# Patient Record
Sex: FEMALE | Race: WHITE | NOT HISPANIC OR LATINO | Employment: PART TIME | URBAN - METROPOLITAN AREA
[De-identification: names, ages, dates, MRNs, and addresses within clinical notes are randomized per-mention and may not be internally consistent; named-entity substitution may affect disease eponyms.]

---

## 2017-09-18 ENCOUNTER — HOSPITAL ENCOUNTER (EMERGENCY)
Facility: CLINIC | Age: 27
Discharge: HOME | End: 2017-09-18
Attending: EMERGENCY MEDICINE | Admitting: EMERGENCY MEDICINE
Payer: COMMERCIAL

## 2017-09-18 VITALS
TEMPERATURE: 99.6 F | DIASTOLIC BLOOD PRESSURE: 89 MMHG | OXYGEN SATURATION: 98 % | SYSTOLIC BLOOD PRESSURE: 135 MMHG | RESPIRATION RATE: 16 BRPM

## 2017-09-18 DIAGNOSIS — S05.02XA CORNEAL ABRASION, LEFT, INITIAL ENCOUNTER: ICD-10-CM
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
## 2017-09-18 PROCEDURE — 99283 EMERGENCY DEPT VISIT LOW MDM: CPT | Performed by: EMERGENCY MEDICINE

## 2017-09-18 PROCEDURE — 99284 EMERGENCY DEPT VISIT MOD MDM: CPT | Mod: Z6 | Performed by: EMERGENCY MEDICINE

## 2017-09-18 RX ORDER — CIPROFLOXACIN HYDROCHLORIDE 3.5 MG/ML
1 SOLUTION/ DROPS TOPICAL
Status: DISCONTINUED | OUTPATIENT
Start: 2017-09-18 | End: 2017-09-18 | Stop reason: CLARIF

## 2017-09-18 RX ORDER — CIPROFLOXACIN 500 MG/1
500 TABLET, FILM COATED ORAL 2 TIMES DAILY
Qty: 14 TABLET | Refills: 0 | Status: SHIPPED | OUTPATIENT
Start: 2017-09-18 | End: 2017-09-18

## 2017-09-18 RX ORDER — OXYCODONE AND ACETAMINOPHEN 5; 325 MG/1; MG/1
1-2 TABLET ORAL EVERY 4 HOURS PRN
Qty: 4 TABLET | Refills: 0 | Status: SHIPPED | OUTPATIENT
Start: 2017-09-18 | End: 2018-08-09

## 2017-09-18 RX ORDER — OFLOXACIN 3 MG/ML
1 SOLUTION/ DROPS OPHTHALMIC
Status: DISCONTINUED | OUTPATIENT
Start: 2017-09-18 | End: 2017-09-18

## 2017-09-18 RX ORDER — TETRACAINE HYDROCHLORIDE 5 MG/ML
1-2 SOLUTION OPHTHALMIC ONCE
Status: DISCONTINUED | OUTPATIENT
Start: 2017-09-18 | End: 2017-09-18

## 2017-09-18 RX ORDER — CIPROFLOXACIN HYDROCHLORIDE 3.5 MG/ML
SOLUTION/ DROPS TOPICAL
Qty: 1 BOTTLE | Refills: 0 | Status: SHIPPED | OUTPATIENT
Start: 2017-09-18 | End: 2017-10-26

## 2017-09-18 ASSESSMENT — ENCOUNTER SYMPTOMS
WEAKNESS: 0
PHOTOPHOBIA: 1
EYE DISCHARGE: 1
LIGHT-HEADEDNESS: 0
EYE PAIN: 1
FEVER: 0
TROUBLE SWALLOWING: 0
DIZZINESS: 0
CHILLS: 0
HEADACHES: 0
NUMBNESS: 0
FACIAL ASYMMETRY: 0

## 2017-09-18 NOTE — DISCHARGE INSTRUCTIONS
Return if symptoms worsen or new symptoms develop.  Follow-up primary care physician next available.  Drink plenty of fluids.  Take antibiotic eyedrops as directed.  If worsening vision, pain or other symptoms please return for recheck.  If no significant improvement in 24 hours please return for recheck.  Corneal Abrasion    You have received a scratch or scrape (abrasion) to your cornea. The cornea is the clear part in the front of the eye. This sensitive area is very painful when injured. You may make tears frequently, and your vision may be blurry until the injury heals. You may be sensitive to light.  This part of the body heals quickly. You can expect the pain to go away within 24 to 48 hours. If the abrasion is large or deep, your doctor may apply an eye patch, although this is not always done. An antibiotic ointment or eye drops may also be used to prevent infection.  Numbing drops may be used to relieve the pain temporarily so that your eyes can be examined. However, these drops cannot be prescribed for home use because that would prevent healing and lead to more serious problems. Also, if you can t feel your eye, there is a chance of accidentally injuring it further without knowing it.  Home care    A cold pack (ice in a plastic bag, wrapped in a towel) may be applied over the eye (or eye patch) for 20 minutes at a time, to reduce pain.    You may use acetaminophen or ibuprofen to control pain, unless another pain medicine was prescribed. Note: If you have chronic liver or kidney disease or ever had a stomach ulcer or GI bleeding, talk with your doctor before using these medicines.    Rest your eyes and do not read until symptoms are gone.    If you use contact lenses, do not wear them until all symptoms are gone.    If your vision is affected by the corneal abrasion or if an eye patch was applied, do not drive a motor vehicle or operate machinery until all symptoms are gone. You may have trouble judging  distances using only one eye.    If your eyes are sensitive to light, try wearing sunglasses, or stay indoors until symptoms go away.  Follow-up care  Follow up with your health care provider, or as advised.    If no patch was put on your eye, and used but the pain continues for more than 48 hours, you should have another exam. Return to this facility or contact your health care provider to arrange this.    If your eye was patched and you were asked to remove the patch yourself, see your health care provider. You may also return to this facility if you still have pain after the patch is removed.    If you were given a return appointment for patch removal and re-examination, be sure to keep the appointment. Leaving the patch in place longer than advised could be harmful.  When to seek medical advice  Call your health care provider right away if any of these occur.    Increasing eye pain or pain that does not improve after 24 hours    Discharge from the eye    Increasing redness of the eye or swelling of the eyelids    Worsening vision    Symptoms that worsen after the abrasion has healed  Date Last Reviewed: 6/14/2015 2000-2017 [employer]. [address] [place] [address]. All rights reserved. This information is not intended as a substitute for professional medical care. Always follow your healthcare professional's instructions.

## 2017-09-18 NOTE — ED AVS SNAPSHOT
[facility] Emergency Department    [address] [facility] [address]    Phone:  [phone]    Fax:  [phone]                                       Christianne Ferrell   MRN: [id number]    Department:  [facility] Emergency Department   Date of Visit:  9/18/2017           Patient Information     Date Of Birth          1990        Your diagnoses for this visit were:     Corneal abrasion, left, initial encounter        You were seen by Александр Polk MD.      Follow-up Information     Follow up with Caro Barrios PA-C.    Specialty:  Physician Assistant    Why:  As needed    Contact information:    [address] [place] [address]  [phone]          Follow up with [facility] Emergency Department.    Specialty:  EMERGENCY MEDICINE    Why:  If symptoms worsen    Contact information:    [address] [place] [id number] [phone]    Additional information:    The medical center is located at   [address] ([address]   [facility] [address] in [place], four miles north   of [place]).        Discharge Instructions         Return if symptoms worsen or new symptoms develop.  Follow-up primary care physician next available.  Drink plenty of fluids.  Take antibiotic eyedrops as directed.  If worsening vision, pain or other symptoms please return for recheck.  If no significant improvement in 24 hours please return for recheck.  Corneal Abrasion    You have received a scratch or scrape (abrasion) to your cornea. The cornea is the clear part in the front of the eye. This sensitive area is very painful when injured. You may make tears frequently, and your vision may be blurry until the injury heals. You may be sensitive to light.  This part of the body heals quickly. You can expect the pain to go away within 24 to 48 hours. If the abrasion is large or deep, your doctor may apply an eye patch, although this is not always done. An antibiotic ointment or  eye drops may also be used to prevent infection.  Numbing drops may be used to relieve the pain temporarily so that your eyes can be examined. However, these drops cannot be prescribed for home use because that would prevent healing and lead to more serious problems. Also, if you can t feel your eye, there is a chance of accidentally injuring it further without knowing it.  Home care    A cold pack (ice in a plastic bag, wrapped in a towel) may be applied over the eye (or eye patch) for 20 minutes at a time, to reduce pain.    You may use acetaminophen or ibuprofen to control pain, unless another pain medicine was prescribed. Note: If you have chronic liver or kidney disease or ever had a stomach ulcer or GI bleeding, talk with your doctor before using these medicines.    Rest your eyes and do not read until symptoms are gone.    If you use contact lenses, do not wear them until all symptoms are gone.    If your vision is affected by the corneal abrasion or if an eye patch was applied, do not drive a motor vehicle or operate machinery until all symptoms are gone. You may have trouble judging distances using only one eye.    If your eyes are sensitive to light, try wearing sunglasses, or stay indoors until symptoms go away.  Follow-up care  Follow up with your health care provider, or as advised.    If no patch was put on your eye, and used but the pain continues for more than 48 hours, you should have another exam. Return to this facility or contact your health care provider to arrange this.    If your eye was patched and you were asked to remove the patch yourself, see your health care provider. You may also return to this facility if you still have pain after the patch is removed.    If you were given a return appointment for patch removal and re-examination, be sure to keep the appointment. Leaving the patch in place longer than advised could be harmful.  When to seek medical advice  Call your health care  provider right away if any of these occur.    Increasing eye pain or pain that does not improve after 24 hours    Discharge from the eye    Increasing redness of the eye or swelling of the eyelids    Worsening vision    Symptoms that worsen after the abrasion has healed  Date Last Reviewed: 6/14/2015 2000-2017 [employer]. [address] [place] [address]. All rights reserved. This information is not intended as a substitute for professional medical care. Always follow your healthcare professional's instructions.          24 Hour Appointment Hotline       To make an appointment at any [facility], call [phone] ([phone]). If you don't have a family doctor or clinic, we will help you find one. [place] clinics are conveniently located to serve the needs of you and your family.             Review of your medicines      START taking        Dose / Directions Last dose taken    oxyCODONE-acetaminophen 5-325 MG per tablet   Commonly known as:  PERCOCET   Dose:  1-2 tablet   Quantity:  4 tablet        Take 1-2 tablets by mouth every 4 hours as needed for pain   Refills:  0                Prescriptions were sent or printed at these locations (1 Prescription)                   Other Prescriptions                Printed at Department/Unit printer (1 of 1)         oxyCODONE-acetaminophen (PERCOCET) 5-325 MG per tablet                Orders Needing Specimen Collection     None      Pending Results     No orders found from 9/16/2017 to 9/19/2017.            Pending Culture Results     No orders found from 9/16/2017 to 9/19/2017.            Pending Results Instructions     If you had any lab results that were not finalized at the time of your Discharge, you can call the ED Lab Result RN at [phone]. You will be contacted by this team for any positive Lab results or changes in treatment. The nurses are available 7 days a week from 10A to 6:30P.  You can leave a message 24 hours per  day and they will return your call.        Test Results From Your Hospital Stay               Thank you for choosing [facility]       Thank you for choosing [facility] for your care. Our goal is always to provide you with excellent care. Hearing back from our patients is one way we can continue to improve our services. Please take a few minutes to complete the written survey that you may receive in the mail after you visit with us. Thank you!        [employer] Information     [employer] gives you secure access to your electronic health record. If you see a primary care provider, you can also send messages to your care team and make appointments. If you have questions, please call your primary care clinic.  If you do not have a primary care provider, please call [phone] and they will assist you.        Care EveryWhere ID     This is your Care EveryWhere ID. This could be used by other organizations to access your [facility] medical records  [id number]        Equal Access to Services     KENNY MENDEZ : Karina Armenta, mamie singer, jacqui crenshaw. So [place] -[phone].    ATENCIÓN: Si habla español, tiene a tang disposición servicios gratuitos de asistencia lingüística. Llame al [phone].    We comply with applicable federal civil rights laws and [place] laws. We do not discriminate on the basis of race, color, national origin, age, disability sex, sexual orientation or gender identity.            After Visit Summary       This is your record. Keep this with you and show to your community pharmacist(s) and doctor(s) at your next visit.

## 2017-09-18 NOTE — ED AVS SNAPSHOT
[facility] Emergency Department    [address] [facility] [address]    Phone:  [phone]    Fax:  [phone]                                       Christianne Ferrell   MRN: [id number]    Department:  [facility] Emergency Department   Date of Visit:  9/18/2017           After Visit Summary Signature Page     I have received my discharge instructions, and my questions have been answered. I have discussed any challenges I see with this plan with the nurse or doctor.    ..........................................................................................................................................  Patient/Patient Representative Signature      ..........................................................................................................................................  Patient Representative Print Name and Relationship to Patient    ..................................................               ................................................  Date                                            Time    ..........................................................................................................................................  Reviewed by Signature/Title    ...................................................              ..............................................  Date                                                            Time

## 2017-09-19 NOTE — ED PROVIDER NOTES
"  History     Chief Complaint   Patient presents with     Eye Injury     dog pawed at eye yesterday, sclera red, tearful     HPI  Christianne Ferrell is a 26 year old female who presents to the emergency department complaining of eye injury. Patient was playing with her dog yesterday when it pawed her striking her in the L eye her eye did not hurt that bad yesterday , but when she woke up in the morning her eye was red and painful.her vision is intact but her eye is watering. She denies any fevers or chills and has not had a HA. She currently rates her pain a 6/10.     I have reviewed the Medications, Allergies, Past Medical and Surgical History, and Social History in the Epic system.    Allergies: No Known Allergies      No current facility-administered medications on file prior to encounter.   No current outpatient prescriptions on file prior to encounter.    Patient Active Problem List   Diagnosis     Esophageal reflux     Depression     Tobacco use disorder     Non morbid obesity, unspecified obesity type       Past Surgical History:   Procedure Laterality Date     SURGICAL HISTORY OF -   2000    T & A     SURGICAL HISTORY OF -   2001    Left eye surgery       Social History   Substance Use Topics     Smoking status: Current Every Day Smoker     Packs/day: 0.25     Smokeless tobacco: Not on file      Comment: 1 cig     Alcohol use No       Most Recent Immunizations   Administered Date(s) Administered     DPT 04/15/1998     HEPA 08/28/2015     HIB 03/30/1994     HPV 11/30/2007     HepB 09/01/2005     Influenza (IIV3) 11/04/2008     MMR 04/15/1998     OPV, trivalent, live 04/15/1998     TD (ADULT, 7+) 06/29/2005     TDAP Vaccine (Adacel) 08/28/2015     Varicella 10/15/2008       BMI: Estimated body mass index is 32.96 kg/(m^2) as calculated from the following:    Height as of 8/31/16: 1.58 m (5' 2.21\").    Weight as of 8/31/16: 82.3 kg (181 lb 6.4 oz).      Review of Systems   Constitutional: Negative for chills and " fever.   HENT: Negative for congestion and trouble swallowing.    Eyes: Positive for photophobia, pain and discharge.   Skin: Negative for rash.   Neurological: Negative for dizziness, facial asymmetry, weakness, light-headedness, numbness and headaches.       Physical Exam   BP: 135/89  Heart Rate: 115  Temp: 99.6  F (37.6  C)  Resp: 16  SpO2: 98 %  Physical Exam   Constitutional: She appears well-developed and well-nourished. She appears distressed.   HENT:   Head: Normocephalic.   Mouth/Throat: Oropharynx is clear and moist.   Eyes: EOM are normal. Pupils are equal, round, and reactive to light. Lids are everted and swept, no foreign bodies found. Left eye exhibits discharge. Left eye exhibits no chemosis. No foreign body present in the left eye. Left conjunctiva is injected. Left conjunctiva has no hemorrhage. Left eye exhibits normal extraocular motion.   Slit lamp exam:       The left eye shows corneal abrasion. The left eye shows no corneal flare, no corneal ulcer, no foreign body, no fluorescein uptake and no anterior chamber bulge.       Corneal abrasion   Nursing note and vitals reviewed.      ED Course     ED Course     Procedures             Critical Care time:  none                   Assessments & Plan (with Medical Decision Making)patient has a L corneal abrasion. She will be given ciloxan and a few pain pills. She should return if symptoms worsen or new symptoms develop.      I have reviewed the nursing notes.    I have reviewed the findings, diagnosis, plan and need for follow up with the patient.       Discharge Medication List as of 9/18/2017  1:40 PM      START taking these medications    Details   oxyCODONE-acetaminophen (PERCOCET) 5-325 MG per tablet Take 1-2 tablets by mouth every 4 hours as needed for pain, Disp-4 tablet, R-0, Local Print             Final diagnoses:   Corneal abrasion, left, initial encounter       9/18/2017   [facility] EMERGENCY DEPARTMENT     Александр Polk,  MD  09/18/17 9948

## 2017-10-14 ENCOUNTER — HEALTH MAINTENANCE LETTER (OUTPATIENT)
Age: 27
End: 2017-10-14

## 2017-10-26 ENCOUNTER — OFFICE VISIT (OUTPATIENT)
Dept: OBGYN | Facility: CLINIC | Age: 27
End: 2017-10-26
Payer: COMMERCIAL

## 2017-10-26 VITALS
HEIGHT: 62 IN | SYSTOLIC BLOOD PRESSURE: 128 MMHG | WEIGHT: 184.2 LBS | DIASTOLIC BLOOD PRESSURE: 82 MMHG | HEART RATE: 108 BPM | BODY MASS INDEX: 33.9 KG/M2

## 2017-10-26 DIAGNOSIS — N91.2 AMENORRHEA: Primary | ICD-10-CM

## 2017-10-26 DIAGNOSIS — Z12.4 SCREENING FOR CERVICAL CANCER: ICD-10-CM

## 2017-10-26 LAB
FSH SERPL-ACNC: 5.1 IU/L
GLUCOSE SERPL-MCNC: 96 MG/DL (ref 70–99)
HCG SERPL QL: NEGATIVE
INSULIN SERPL-ACNC: 28.8 MU/L (ref 3–25)
LH SERPL-ACNC: 3.6 IU/L
PROLACTIN SERPL-MCNC: 16 UG/L (ref 3–27)
TSH SERPL DL<=0.005 MIU/L-ACNC: 0.49 MU/L (ref 0.4–4)

## 2017-10-26 PROCEDURE — 84270 ASSAY OF SEX HORMONE GLOBUL: CPT | Performed by: OBSTETRICS & GYNECOLOGY

## 2017-10-26 PROCEDURE — 82627 DEHYDROEPIANDROSTERONE: CPT | Performed by: OBSTETRICS & GYNECOLOGY

## 2017-10-26 PROCEDURE — 84443 ASSAY THYROID STIM HORMONE: CPT | Performed by: OBSTETRICS & GYNECOLOGY

## 2017-10-26 PROCEDURE — 84403 ASSAY OF TOTAL TESTOSTERONE: CPT | Performed by: OBSTETRICS & GYNECOLOGY

## 2017-10-26 PROCEDURE — 83498 ASY HYDROXYPROGESTERONE 17-D: CPT | Performed by: OBSTETRICS & GYNECOLOGY

## 2017-10-26 PROCEDURE — 83001 ASSAY OF GONADOTROPIN (FSH): CPT | Performed by: OBSTETRICS & GYNECOLOGY

## 2017-10-26 PROCEDURE — 84703 CHORIONIC GONADOTROPIN ASSAY: CPT | Performed by: OBSTETRICS & GYNECOLOGY

## 2017-10-26 PROCEDURE — 83525 ASSAY OF INSULIN: CPT | Performed by: OBSTETRICS & GYNECOLOGY

## 2017-10-26 PROCEDURE — 99385 PREV VISIT NEW AGE 18-39: CPT | Performed by: OBSTETRICS & GYNECOLOGY

## 2017-10-26 PROCEDURE — 84146 ASSAY OF PROLACTIN: CPT | Performed by: OBSTETRICS & GYNECOLOGY

## 2017-10-26 PROCEDURE — 83002 ASSAY OF GONADOTROPIN (LH): CPT | Performed by: OBSTETRICS & GYNECOLOGY

## 2017-10-26 PROCEDURE — G0145 SCR C/V CYTO,THINLAYER,RESCR: HCPCS | Performed by: OBSTETRICS & GYNECOLOGY

## 2017-10-26 PROCEDURE — 82947 ASSAY GLUCOSE BLOOD QUANT: CPT | Performed by: OBSTETRICS & GYNECOLOGY

## 2017-10-26 PROCEDURE — 99213 OFFICE O/P EST LOW 20 MIN: CPT | Mod: 25 | Performed by: OBSTETRICS & GYNECOLOGY

## 2017-10-26 PROCEDURE — 36415 COLL VENOUS BLD VENIPUNCTURE: CPT | Performed by: OBSTETRICS & GYNECOLOGY

## 2017-10-26 NOTE — NURSING NOTE
"Chief Complaint   Patient presents with     Physical       Initial /82 (BP Location: Left arm, Patient Position: Chair, Cuff Size: Adult Large)  Pulse 108  Ht 5' 2.21\" (1.58 m)  Wt 184 lb 3.2 oz (83.6 kg)  LMP 10/26/2017  BMI 33.46 kg/m2 Estimated body mass index is 33.46 kg/(m^2) as calculated from the following:    Height as of this encounter: 5' 2.21\" (1.58 m).    Weight as of this encounter: 184 lb 3.2 oz (83.6 kg).  Medication Reconciliation: complete     Monico Stockton CMA      "

## 2017-10-26 NOTE — PROGRESS NOTES
SUBJECTIVE:   CC: Christianne Ferrell is an 26 year old woman who presents for preventive health visit. Patient was significantly late for her appointment. This was discussed. She had a number of concerns today which included amenorrhea ×3 months, this is a new thing. She also was told she might have PCOS in the past. She admits to history of acne, difficulty controlling weight and a history of irregular menses. She denies significant increased hair growth. She denies abnormal nipple discharge, visual disturbance or headache. She is not on any hormonal manipulation at this time. Home pregnancy test was negative. She declines STD screening.    Healthy Habits:    Do you get at least three servings of calcium containing foods daily (dairy, green leafy vegetables, etc.)? yes and no, taking calcium and/or vitamin D supplement: no    Amount of exercise or daily activities, outside of work: 3 day(s) per week    Problems taking medications regularly Yes     Medication side effects: No    Have you had an eye exam in the past two years? no    Do you see a dentist twice per year? no    Do you have sleep apnea, excessive snoring or daytime drowsiness?no      Today's PHQ-2 Score:   PHQ-2 ( 1999 [employer]) 10/26/2017 8/11/2014   Q1: Little interest or pleasure in doing things 2 0   Q2: Feeling down, depressed or hopeless 0 0   PHQ-2 Score 2 0       Abuse: Current or Past(Physical, Sexual or Emotional)- No  Do you feel safe in your environment - Yes    Social History   Substance Use Topics     Smoking status: Current Every Day Smoker     Packs/day: 0.25     Smokeless tobacco: Never Used      Comment: 1 cig     Alcohol use No     The patient does not drink >3 drinks per day nor >7 drinks per week.      Pertinent mammograms are reviewed under the imaging tab.  History of abnormal Pap smear: NO - age 21-29 PAP every 3 years recommended    Reviewed and updated as needed this visit by clinical staff  Tobacco  Allergies  Meds  Med Hx   "Surg Hx  Fam Hx  Soc Hx        Reviewed and updated as needed this visit by Provider        History reviewed. No pertinent past medical history.   Past Surgical History:   Procedure Laterality Date     SURGICAL HISTORY OF -   2000    T & A     SURGICAL HISTORY OF -   2001    Left eye surgery     Obstetric History     No data available          ROS:  C: NEGATIVE for fever, chills, change in weight  I: NEGATIVE for worrisome rashes, moles or lesions  E: NEGATIVE for vision changes or irritation  ENT: NEGATIVE for ear, mouth and throat problems  R: NEGATIVE for significant cough or SOB  B: NEGATIVE for masses, tenderness or discharge  CV: NEGATIVE for chest pain, palpitations or peripheral edema  GI: NEGATIVE for nausea, abdominal pain, heartburn, or change in bowel habits  : NEGATIVE for unusual urinary or vaginal symptoms. Periods are intermittently absent. Questionable history of PCOS.  M: NEGATIVE for significant arthralgias or myalgia  N: NEGATIVE for weakness, dizziness or paresthesias  P: NEGATIVE for changes in mood or affect    OBJECTIVE:   /82 (BP Location: Left arm, Patient Position: Chair, Cuff Size: Adult Large)  Pulse 108  Ht 5' 2.21\" (1.58 m)  Wt 184 lb 3.2 oz (83.6 kg)  LMP 10/26/2017  BMI 33.46 kg/m2  EXAM:  GENERAL: healthy, alert and no distress  EYES: Eyes grossly normal to inspection, PERRL and conjunctivae and sclerae normal  HENT: ear canals and TM's normal, nose and mouth without ulcers or lesions  NECK: no adenopathy, no asymmetry, masses, or scars and thyroid normal to palpation  RESP: lungs clear to auscultation - no rales, rhonchi or wheezes  BREAST: normal without masses, tenderness or nipple discharge and no palpable axillary masses or adenopathy  CV: regular rate and rhythm, normal S1 S2, no S3 or S4, no murmur, click or rub, no peripheral edema and peripheral pulses strong  ABDOMEN: soft, nontender, no hepatosplenomegaly, no masses and bowel sounds normal   (female): " normal female external genitalia, normal urethral meatus, vaginal mucosa pink, moist, well rugated, and normal cervix/adnexa/uterus without masses or discharge  MS: no gross musculoskeletal defects noted, no edema  SKIN: no suspicious lesions or rashes  NEURO: Normal strength and tone, mentation intact and speech normal  PSYCH: mentation appears normal, affect normal/bright    ASSESSMENT/PLAN:       ICD-10-CM    1. Screening for cervical cancer Z12.4 Pap imaged thin layer screen only - recommended age 21 - 24 years   2. Amenorrhea: Potential causes were discussed. Obtained PCO S panel. Check pregnancy test. Check TSH. Once labs are back patient may require a Provera challenge. Likely diagnosis is interval anovulatory cycles.  3. Patient declines STD screening today.  4. Pap smear was obtained and will let her know results of her Pap and any appropriate follow-up.  5. Smoking cessation recommended. Patient does not wish to quit at this time.      Counseling Resources:  ATP IV Guidelines  Pooled Cohorts Equation Calculator  Breast Cancer Risk Calculator  FRAX Risk Assessment  ICSI Preventive Guidelines  Dietary Guidelines for Americans, 2010  USDA's MyPlate  ASA Prophylaxis  Lung CA Screening    Bennie Aguilera,   [facility]

## 2017-10-26 NOTE — MR AVS SNAPSHOT
After Visit Summary   10/26/2017    Christianne Ferrell    MRN: [id number]           Patient Information     Date Of Birth          1990        Visit Information        Provider Department      10/26/2017 9:00 AM Bennie Aguilera,  [facility]        Today's Diagnoses     Amenorrhea    -  1    Screening for cervical cancer          Care Instructions      Preventive Health Recommendations  Female Ages 18 to 25     Yearly exam:     See your health care provider every year in order to  o Review health changes.   o Discuss preventive care.    o Review your medicines if your doctor has prescribed any.      You should be tested each year for STDs (sexually transmitted diseases).       After age 20, talk to your provider about how often you should have cholesterol testing.      Starting at age 21, get a Pap test every three years. If you have an abnormal result, your doctor may have you test more often.      If you are at risk for diabetes, you should have a diabetes test (fasting glucose).     Shots:     Get a flu shot each year.     Get a tetanus shot every 10 years.     Consider getting the shot (vaccine) that prevents cervical cancer (Gardasil).    Nutrition:     Eat at least 5 servings of fruits and vegetables each day.    Eat whole-grain bread, whole-wheat pasta and brown rice instead of white grains and rice.    Talk to your provider about Calcium and Vitamin D.     Lifestyle    Exercise at least 150 minutes a week each week (30 minutes a day, 5 days a week). This will help you control your weight and prevent disease.    Limit alcohol to one drink per day.    No smoking.     Wear sunscreen to prevent skin cancer.    See your dentist every six months for an exam and cleaning.          Follow-ups after your visit        Who to contact     If you have questions or need follow up information about today's clinic visit or your schedule please contact [facility] directly at  "[phone].  Normal or non-critical lab and imaging results will be communicated to you by Ideal Networkhart, letter or phone within 4 business days after the clinic has received the results. If you do not hear from us within 7 days, please contact the clinic through Ideal Networkhart or phone. If you have a critical or abnormal lab result, we will notify you by phone as soon as possible.  Submit refill requests through IdentityForge or call your pharmacy and they will forward the refill request to us. Please allow 3 business days for your refill to be completed.          Additional Information About Your Visit        Ideal Networkhart Information     IdentityForge gives you secure access to your electronic health record. If you see a primary care provider, you can also send messages to your care team and make appointments. If you have questions, please call your primary care clinic.  If you do not have a primary care provider, please call [phone] and they will assist you.        Care EveryWhere ID     This is your Care EveryWhere ID. This could be used by other organizations to access your [facility] medical records  [id number]        Your Vitals Were     Pulse Height Last Period BMI (Body Mass Index)          108 5' 2.21\" (1.58 m) 10/26/2017 33.46 kg/m2         Blood Pressure from Last 3 Encounters:   10/26/17 128/82   09/18/17 135/89   08/31/16 124/80    Weight from Last 3 Encounters:   10/26/17 184 lb 3.2 oz (83.6 kg)   08/31/16 181 lb 6.4 oz (82.3 kg)   12/04/15 174 lb 3.2 oz (79 kg)              We Performed the Following     17 OH progesterone     DHEA sulfate     Follicle stimulating hormone     Glucose     HCG qualitative Blood     Insulin level     Lutropin     Pap imaged thin layer screen only - recommended age 21 - 24 years     Prolactin     Testosterone Free and Total     TSH        Primary Care Provider Office Phone # Fax #    Caro Barrios PA-C [phone] [address] [place] [address]        Equal " Access to Services     [facility] Campus[facility] : Hadii aad ku hadjacquelineliz Lurdesroman, wairenada luqjulissaha, qavalery ivethcaljacqui conley. So [facility] [phone].    ATENCIÓN: Si habla español, tiene a tang disposición servicios gratuitos de asistencia lingüística. Llame al [phone].    We comply with applicable federal civil rights laws and [place] laws. We do not discriminate on the basis of race, color, national origin, age, disability, sex, sexual orientation, or gender identity.            Thank you!     Thank you for choosing [facility]  for your care. Our goal is always to provide you with excellent care. Hearing back from our patients is one way we can continue to improve our services. Please take a few minutes to complete the written survey that you may receive in the mail after your visit with us. Thank you!             Your Updated Medication List - Protect others around you: Learn how to safely use, store and throw away your medicines at www.disposemymeds.org.          This list is accurate as of: 10/26/17  9:50 AM.  Always use your most recent med list.                   Brand Name Dispense Instructions for use Diagnosis    oxyCODONE-acetaminophen 5-325 MG per tablet    PERCOCET    4 tablet    Take 1-2 tablets by mouth every 4 hours as needed for pain

## 2017-10-27 LAB — DHEA-S SERPL-MCNC: 149 UG/DL (ref 35–430)

## 2017-10-28 LAB
SHBG SERPL-SCNC: 34 NMOL/L (ref 30–135)
TESTOST FREE SERPL-MCNC: 0.42 NG/DL (ref 0.08–0.74)
TESTOST SERPL-MCNC: 24 NG/DL (ref 8–60)

## 2017-10-30 LAB
COPATH REPORT: NORMAL
PAP: NORMAL

## 2017-11-02 LAB — 17OHP SERPL-MCNC: 18 NG/DL

## 2017-11-06 NOTE — PROGRESS NOTES
Call to pt to notify of below.  Unable to reach.  Left message for pt to call back     Neyda Gonzalez   [facility]  RN

## 2017-11-09 ENCOUNTER — OFFICE VISIT (OUTPATIENT)
Dept: OBGYN | Facility: CLINIC | Age: 27
End: 2017-11-09
Payer: COMMERCIAL

## 2017-11-09 VITALS
HEIGHT: 62 IN | BODY MASS INDEX: 34.23 KG/M2 | SYSTOLIC BLOOD PRESSURE: 128 MMHG | HEART RATE: 104 BPM | WEIGHT: 186 LBS | DIASTOLIC BLOOD PRESSURE: 83 MMHG

## 2017-11-09 DIAGNOSIS — E28.2 PCOS (POLYCYSTIC OVARIAN SYNDROME): Primary | ICD-10-CM

## 2017-11-09 DIAGNOSIS — N91.2 AMENORRHEA: ICD-10-CM

## 2017-11-09 LAB
ALBUMIN SERPL-MCNC: 3.8 G/DL (ref 3.4–5)
ALP SERPL-CCNC: 112 U/L (ref 40–150)
ALT SERPL W P-5'-P-CCNC: 102 U/L (ref 0–50)
AST SERPL W P-5'-P-CCNC: 56 U/L (ref 0–45)
BILIRUB DIRECT SERPL-MCNC: <0.1 MG/DL (ref 0–0.2)
BILIRUB SERPL-MCNC: 0.5 MG/DL (ref 0.2–1.3)
HCG SERPL QL: NEGATIVE
PROT SERPL-MCNC: 8 G/DL (ref 6.8–8.8)

## 2017-11-09 PROCEDURE — 84703 CHORIONIC GONADOTROPIN ASSAY: CPT | Performed by: OBSTETRICS & GYNECOLOGY

## 2017-11-09 PROCEDURE — 99213 OFFICE O/P EST LOW 20 MIN: CPT | Performed by: OBSTETRICS & GYNECOLOGY

## 2017-11-09 PROCEDURE — 80076 HEPATIC FUNCTION PANEL: CPT | Performed by: OBSTETRICS & GYNECOLOGY

## 2017-11-09 PROCEDURE — 36415 COLL VENOUS BLD VENIPUNCTURE: CPT | Performed by: OBSTETRICS & GYNECOLOGY

## 2017-11-09 RX ORDER — MEDROXYPROGESTERONE ACETATE 10 MG
10 TABLET ORAL DAILY
Qty: 10 TABLET | Refills: 0 | Status: SHIPPED | OUTPATIENT
Start: 2017-11-09 | End: 2017-11-19

## 2017-11-09 RX ORDER — METFORMIN HCL 500 MG
500 TABLET, EXTENDED RELEASE 24 HR ORAL
Qty: 30 TABLET | Refills: 2 | Status: SHIPPED | OUTPATIENT
Start: 2017-11-09 | End: 2018-08-09

## 2017-11-09 NOTE — NURSING NOTE
"Chief Complaint   Patient presents with     Consult       Initial /83 (BP Location: Right arm, Patient Position: Sitting, Cuff Size: Adult Large)  Pulse 104  Ht 5' 2\" (1.575 m)  Wt 186 lb (84.4 kg)  LMP 10/26/2017  Breastfeeding? No  BMI 34.02 kg/m2 Estimated body mass index is 34.02 kg/(m^2) as calculated from the following:    Height as of this encounter: 5' 2\" (1.575 m).    Weight as of this encounter: 186 lb (84.4 kg).  Medication Reconciliation: complete   Michelle Hernández CMA      "

## 2017-11-09 NOTE — MR AVS SNAPSHOT
"              After Visit Summary   11/9/2017    Christianne Ferrell    MRN: [id number]           Patient Information     Date Of Birth          1990        Visit Information        Provider Department      11/9/2017 9:30 AM Bennie Aguilera, DO [facility]        Today's Diagnoses     PCOS (polycystic ovarian syndrome)    -  1    Amenorrhea           Follow-ups after your visit        Follow-up notes from your care team     Return in about 1 month (around 12/9/2017).      Who to contact     If you have questions or need follow up information about today's clinic visit or your schedule please contact [facility] directly at [phone].  Normal or non-critical lab and imaging results will be communicated to you by MyChart, letter or phone within 4 business days after the clinic has received the results. If you do not hear from us within 7 days, please contact the clinic through Aquest Systemshart or phone. If you have a critical or abnormal lab result, we will notify you by phone as soon as possible.  Submit refill requests through Noovo or call your pharmacy and they will forward the refill request to us. Please allow 3 business days for your refill to be completed.          Additional Information About Your Visit        MyChart Information     Noovo gives you secure access to your electronic health record. If you see a primary care provider, you can also send messages to your care team and make appointments. If you have questions, please call your primary care clinic.  If you do not have a primary care provider, please call [phone] and they will assist you.        Care EveryWhere ID     This is your Care EveryWhere ID. This could be used by other organizations to access your [facility] medical records  [id number]        Your Vitals Were     Pulse Height Last Period Breastfeeding? BMI (Body Mass Index)       104 5' 2\" (1.575 m) 10/26/2017 No 34.02 kg/m2        Blood Pressure from Last 3 " Encounters:   11/09/17 128/83   10/26/17 128/82   09/18/17 135/89    Weight from Last 3 Encounters:   11/09/17 186 lb (84.4 kg)   10/26/17 184 lb 3.2 oz (83.6 kg)   08/31/16 181 lb 6.4 oz (82.3 kg)              We Performed the Following     HCG qualitative Blood     Hepatic panel (Albumin, ALT, AST, Bili, Alk Phos, TP)          Today's Medication Changes          These changes are accurate as of: 11/9/17  9:54 AM.  If you have any questions, ask your nurse or doctor.               Start taking these medicines.        Dose/Directions    medroxyPROGESTERone 10 MG tablet   Commonly known as:  PROVERA   Used for:  Amenorrhea   Started by:  Bennie Aguilera DO        Dose:  10 mg   Take 1 tablet (10 mg) by mouth daily for 10 days   Quantity:  10 tablet   Refills:  0       metFORMIN 500 MG 24 hr tablet   Commonly known as:  GLUCOPHAGE-XR   Used for:  PCOS (polycystic ovarian syndrome)   Started by:  Bennie Aguilera DO        Dose:  500 mg   Take 1 tablet (500 mg) by mouth daily (with dinner)   Quantity:  30 tablet   Refills:  2            Where to get your medicines      These medications were sent to [facility] #[facility] [address]    Hours:  Closed 10-16-08 business to [facility] Phone:  [phone]     medroxyPROGESTERone 10 MG tablet    metFORMIN 500 MG 24 hr tablet                Primary Care Provider Office Phone # Fax #    Caro Barrios PA-C [phone] [address] [facility] [address]        Equal Access to Services     KENNY MENDEZ AH: Karina Armenta, mamie singer, jacqui crenshaw. So [facility] [phone].    ATENCIÓN: Si habla español, tiene a tang disposición servicios gratuitos de asistencia lingüística. Llame al [phone].    We comply with applicable federal civil rights laws and [place] laws. We do not discriminate on the  basis of race, color, national origin, age, disability, sex, sexual orientation, or gender identity.            Thank you!     Thank you for choosing [facility]  for your care. Our goal is always to provide you with excellent care. Hearing back from our patients is one way we can continue to improve our services. Please take a few minutes to complete the written survey that you may receive in the mail after your visit with us. Thank you!             Your Updated Medication List - Protect others around you: Learn how to safely use, store and throw away your medicines at www.disposemymeds.org.          This list is accurate as of: 11/9/17  9:54 AM.  Always use your most recent med list.                   Brand Name Dispense Instructions for use Diagnosis    medroxyPROGESTERone 10 MG tablet    PROVERA    10 tablet    Take 1 tablet (10 mg) by mouth daily for 10 days    Amenorrhea       metFORMIN 500 MG 24 hr tablet    GLUCOPHAGE-XR    30 tablet    Take 1 tablet (500 mg) by mouth daily (with dinner)    PCOS (polycystic ovarian syndrome)       oxyCODONE-acetaminophen 5-325 MG per tablet    PERCOCET    4 tablet    Take 1-2 tablets by mouth every 4 hours as needed for pain

## 2017-11-09 NOTE — PROGRESS NOTES
Patient presents for results. She is undergone a workup for polycystic ovarian syndrome with a history of interval amenorrhea. She does wish to conceive. Her labs showed significant insulin resistance and glucose to insulin ratio which is supportive of PCO OS with associated insulin resistance. This is probably part of the pathophysiology behind her anovulatory cycles and subsequent amenorrhea. The pathophysiology was reviewed. I diagrammatically represented insulin resistant so she could understand. I recommended metformin extended release 500 mg a day increasing 500 mg every month until she is up to 1500 mg a day, at which point hopefully she'll be ovulating predictably and having regular cycles. Rechecking baseline LFTs and she needs repeat LFTs in 1 month. Baseline pregnancy test will be checked today as well. We are also point to withdraw her with Provera 10 mg p.o. q. day ×10 days.    The potential GI side effects and risks versus benefit profile of metformin was discussed. Patient voices appreciation for my time today and all her questions were answered. On her visit today was spent in counseling with her visit lasted 15 minutes.

## 2017-11-09 NOTE — LETTER
[facility]  [address] [place] [address]  [phone]        March 15, 2018    Christianne Ferrell  [address] [place] [address]              Dear Christianne Ferrell    This is to remind you that your Liver Panel lab is due.    You may call our office at [phone] to schedule an appointment.    Please disregard this notice if you have already had your labs drawn or made an appointment.        Sincerely,        Bennie Aguilera MD

## 2018-03-12 ENCOUNTER — OFFICE VISIT (OUTPATIENT)
Dept: OBGYN | Facility: CLINIC | Age: 28
End: 2018-03-12
Payer: COMMERCIAL

## 2018-03-12 VITALS
WEIGHT: 181 LBS | TEMPERATURE: 98.6 F | DIASTOLIC BLOOD PRESSURE: 69 MMHG | HEART RATE: 83 BPM | RESPIRATION RATE: 16 BRPM | HEIGHT: 62 IN | SYSTOLIC BLOOD PRESSURE: 134 MMHG | BODY MASS INDEX: 33.31 KG/M2

## 2018-03-12 DIAGNOSIS — Z31.69 PRE-CONCEPTION COUNSELING: ICD-10-CM

## 2018-03-12 DIAGNOSIS — N93.9 VAGINAL SPOTTING: Primary | ICD-10-CM

## 2018-03-12 DIAGNOSIS — N76.0 BV (BACTERIAL VAGINOSIS): ICD-10-CM

## 2018-03-12 DIAGNOSIS — B96.89 BV (BACTERIAL VAGINOSIS): ICD-10-CM

## 2018-03-12 LAB
HCG UR QL: NEGATIVE
SPECIMEN SOURCE: ABNORMAL
WET PREP SPEC: ABNORMAL

## 2018-03-12 PROCEDURE — 87491 CHLMYD TRACH DNA AMP PROBE: CPT | Performed by: OBSTETRICS & GYNECOLOGY

## 2018-03-12 PROCEDURE — 87591 N.GONORRHOEAE DNA AMP PROB: CPT | Performed by: OBSTETRICS & GYNECOLOGY

## 2018-03-12 PROCEDURE — 87210 SMEAR WET MOUNT SALINE/INK: CPT | Performed by: OBSTETRICS & GYNECOLOGY

## 2018-03-12 PROCEDURE — 81025 URINE PREGNANCY TEST: CPT | Performed by: OBSTETRICS & GYNECOLOGY

## 2018-03-12 PROCEDURE — 99214 OFFICE O/P EST MOD 30 MIN: CPT | Performed by: OBSTETRICS & GYNECOLOGY

## 2018-03-12 RX ORDER — METRONIDAZOLE 500 MG/1
500 TABLET ORAL 2 TIMES DAILY
Qty: 14 TABLET | Refills: 0 | Status: SHIPPED | OUTPATIENT
Start: 2018-03-12 | End: 2018-08-09

## 2018-03-12 NOTE — MR AVS SNAPSHOT
After Visit Summary   3/12/2018    Christianne Ferrell    MRN: [id number]           Patient Information     Date Of Birth          1990        Visit Information        Provider Department      3/12/2018 2:00 PM Shahida Moore MD [facility]        Today's Diagnoses     Vaginal spotting    -  1    Pre-conception counseling           Follow-ups after your visit        Future tests that were ordered for you today     Open Future Orders        Priority Expected Expires Ordered    ALT Routine  3/12/2019 3/12/2018    AST Routine  3/12/2019 3/12/2018    US Pelvic Complete w Transvaginal Routine  3/12/2019 3/12/2018            Who to contact     If you have questions or need follow up information about today's clinic visit or your schedule please contact [facility] directly at [phone].  Normal or non-critical lab and imaging results will be communicated to you by MyChart, letter or phone within 4 business days after the clinic has received the results. If you do not hear from us within 7 days, please contact the clinic through MyChart or phone. If you have a critical or abnormal lab result, we will notify you by phone as soon as possible.  Submit refill requests through Adbongo or call your pharmacy and they will forward the refill request to us. Please allow 3 business days for your refill to be completed.          Additional Information About Your Visit        MyChart Information     Adbongo gives you secure access to your electronic health record. If you see a primary care provider, you can also send messages to your care team and make appointments. If you have questions, please call your primary care clinic.  If you do not have a primary care provider, please call [phone] and they will assist you.        Care EveryWhere ID     This is your Care EveryWhere ID. This could be used by other organizations to access your [facility] medical records  [id number]       "  Your Vitals Were     Pulse Temperature Respirations Height Last Period Breastfeeding?    83 98.6  F (37  C) (Tympanic) 16 5' 2\" (1.575 m) 10/26/2017 No    BMI (Body Mass Index)                   33.11 kg/m2            Blood Pressure from Last 3 Encounters:   03/12/18 134/69   11/09/17 128/83   10/26/17 128/82    Weight from Last 3 Encounters:   03/12/18 181 lb (82.1 kg)   11/09/17 186 lb (84.4 kg)   10/26/17 184 lb 3.2 oz (83.6 kg)              We Performed the Following     Chlamydia trachomatis PCR     HCG Qual, Urine - CSC and Range (AFO0050)     Neisseria gonorrhoeae PCR     Wet prep        Primary Care Provider Office Phone # Fax #    Caro Barrios PA-C [phone] [address] [address]        Equal Access to Services     KENNY MENDEZ : Hadii aad ku hadasho Solylaali, waaxda luqadaha, qaybta kaalmada adeegyada, jacqui bledsoe . So [facility] [phone].    ATENCIÓN: Si habla español, tiene a tang disposición servicios gratuitos de asistencia lingüística. Llame al [phone].    We comply with applicable federal civil rights laws and [place] laws. We do not discriminate on the basis of race, color, national origin, age, disability, sex, sexual orientation, or gender identity.            Thank you!     Thank you for choosing [facility]  for your care. Our goal is always to provide you with excellent care. Hearing back from our patients is one way we can continue to improve our services. Please take a few minutes to complete the written survey that you may receive in the mail after your visit with us. Thank you!             Your Updated Medication List - Protect others around you: Learn how to safely use, store and throw away your medicines at www.disposemymeds.org.          This list is accurate as of 3/12/18  3:16 PM.  Always use your most recent med list.                   Brand Name Dispense Instructions for use Diagnosis    " metFORMIN 500 MG 24 hr tablet    GLUCOPHAGE-XR    30 tablet    Take 1 tablet (500 mg) by mouth daily (with dinner)    PCOS (polycystic ovarian syndrome)       oxyCODONE-acetaminophen 5-325 MG per tablet    PERCOCET    4 tablet    Take 1-2 tablets by mouth every 4 hours as needed for pain

## 2018-03-12 NOTE — PROGRESS NOTES
"  SUBJECTIVE:                                                   CC:  Patient presents with:  Consult      HPI:  Christianne Ferrell is a 27 year old  who presents for abnormal periods/spotting for 2 months.  She was seen with Dr Aguilera in 2017 and diagnosed with insulin resistance and potentially PCOS at that time.  Has never had an ultrasound but did do the blood work which was notable only for elevated insulin level and elevated ALT/AST.      Since that time she has not had a regular period, but the last two months has had spotting which goes on for about 3 weeks, then she gets a break for a week, then it started again.  She also has vomited yesterday, home UPTs were negative.  She would like to be pregnant, currently not using contraception.  Hasn't started PNV.  No other symptoms such as fever/chills, abnormal discharge (other than the spotting, which is very light), constipation/diarrhea.   History of chlamydia 4 years ago, was treated but not retested at that time.     ROS: 10 point ROS negative other than as listed above in HPI.    Gyn History:  Patient's last menstrual period was 10/26/2017.     Patient is sexually active.  Using none for contraception.   Recent pap smears:    Lab Results   Component Value Date    PAP NIL 10/26/2017    PAP NIL 2014    PAP NIL 2009       PMH, PSH, Soc Hx, Fam Hx, Meds, and allergies reviewed in Epic.    OBJECTIVE:     /69 (BP Location: Right arm, Patient Position: Sitting, Cuff Size: Adult Large)  Pulse 83  Temp 98.6  F (37  C) (Tympanic)  Resp 16  Ht 5' 2\" (1.575 m)  Wt 181 lb (82.1 kg)  LMP 10/26/2017  Breastfeeding? No  BMI 33.11 kg/m2    Gen: Healthy appearing obese female, no acute distress, comfortable, , abdominal adiposity  HENT: No scleral injection or icterus  CV: Regular rate  Resp: Normal work of breathing, no cough  GI: Abdomen soft, non-tender. No masses, organomegaly  Skin: No suspicious lesions or rashes - no " hirsutism, no acanthosis, no acne noted  Psychiatric: mentation appears normal and affect bright    : Normal external female genitalia.  No external lesions, normal hair distribution.  Shaved.  SSE: Speculum exam reveals vaginal epithelium well rugated with slightly malodorous pink tinged discharge. Cervix appears smooth, pink, with no visible lesions.   Bimanual exam reveals normal size uterus, non-tender, and mobile. No adnexal masses or tenderness. No cervical motion tenderness.  Pelvic floor muscles nontender, urethra nontender.    Test Results:  Component      Latest Ref Rng & Units 10/26/2017 11/9/2017   Bilirubin Direct      0.0 - 0.2 mg/dL  <0.1   Bilirubin Total      0.2 - 1.3 mg/dL  0.5   Albumin      3.4 - 5.0 g/dL  3.8   Protein Total      6.8 - 8.8 g/dL  8.0   Alkaline Phosphatase      40 - 150 U/L  112   ALT      0 - 50 U/L  102 (H)   AST      0 - 45 U/L  56 (H)   Testosterone Total      8 - 60 ng/dL 24    Sex Hormone Binding Globulin      30 - 135 nmol/L 34    Free Testosterone Calculated      0.08 - 0.74 ng/dL 0.42    PAP       NIL    Copath Report       Patient Name: BRAYAN ARMENDARIZ .    TSH      0.40 - 4.00 mU/L 0.49    Prolactin      3 - 27 ug/L 16    FSH      IU/L 5.1    Lutropin      IU/L 3.6    DHEA Sulfate      35 - 430 ug/dL 149    17-OH Progesterone      ng/dL 18    HCG Qualitative Serum      NEG:Negative Negative Negative   Glucose      70 - 99 mg/dL 96    Insulin      3 - 25 mU/L 28.8 (H)        ASSESSMENT/PLAN:                                                      1. Vaginal spotting  Discussed ruling out infection with gc/chlam and wet prep.  No CMT.  No evidence of vaginal excoriation/fissure or spotting coming from urethra or rectum.  Also discussed pelvic ultrasound to rule out thickened endometrium and also to potentially diagnose PCOS (to look at ovaries).  Will rule out pregnancy today as well.  Discussed potentially empiric tx with doxycycline if all other testing negative.     - HCG Qual, Urine - CSC and Range (LAC2473)  - US Pelvic Complete w Transvaginal; Future  - Wet prep  - Neisseria gonorrhoeae PCR  - Chlamydia trachomatis PCR    2. Pre-conception counseling  Discussed PCOS and what the diagnosis means, and how you would make the diagnosis.  She never took the metformin because her LFTs were elevated, but is now considering trying to get pregnant so would maybe want to lose weight/take the metformin.   Discussed testing ALT/AST again now.  Discussed ovulation induction, also how weight loss will likely help to regulate periods and ovulation.  Discussed PNV for 1 month prior to conception to decrease nausea and NT defects.  Discussed optimization of health prior to pregnancy, tobacco cessation.    - ALT; Future  - AST; Future    Shahida Moore MD, MPH  Obstetrics and Gynecology     Total time spent was 30 minutes; greater than 50% of time was spent in counseling and/or coordination of care for the above listed diagnoses, not including time spent on the procedure.

## 2018-03-12 NOTE — PROGRESS NOTES
Please call the patient with the results. Thanks!    Bacterial vaginosis infection.  Please give rx for po flagyl 500mg po BID x7d or metrogel qhs x5d depending on pt preference.    Shahida Moore MD

## 2018-03-12 NOTE — NURSING NOTE
"Chief Complaint   Patient presents with     Consult       Initial /69 (BP Location: Right arm, Patient Position: Sitting, Cuff Size: Adult Large)  Pulse 83  Temp 98.6  F (37  C) (Tympanic)  Resp 16  Ht 5' 2\" (1.575 m)  Wt 181 lb (82.1 kg)  LMP 10/26/2017  Breastfeeding? No  BMI 33.11 kg/m2 Estimated body mass index is 33.11 kg/(m^2) as calculated from the following:    Height as of this encounter: 5' 2\" (1.575 m).    Weight as of this encounter: 181 lb (82.1 kg).  Medication Reconciliation: complete   Michelle Hernández, CHUY      "

## 2018-03-13 LAB
C TRACH DNA SPEC QL NAA+PROBE: NEGATIVE
N GONORRHOEA DNA SPEC QL NAA+PROBE: NEGATIVE
SPECIMEN SOURCE: NORMAL
SPECIMEN SOURCE: NORMAL

## 2018-06-01 ENCOUNTER — HOSPITAL ENCOUNTER (OUTPATIENT)
Dept: ULTRASOUND IMAGING | Facility: CLINIC | Age: 28
Discharge: HOME | End: 2018-06-01
Attending: OBSTETRICS & GYNECOLOGY | Admitting: OBSTETRICS & GYNECOLOGY
Payer: COMMERCIAL

## 2018-06-01 DIAGNOSIS — N93.9 VAGINAL SPOTTING: ICD-10-CM

## 2018-06-01 PROCEDURE — 76830 TRANSVAGINAL US NON-OB: CPT

## 2018-06-04 ENCOUNTER — MYC MEDICAL ADVICE (OUTPATIENT)
Dept: OBGYN | Facility: CLINIC | Age: 28
End: 2018-06-04

## 2018-06-04 DIAGNOSIS — R93.89 ENDOMETRIAL THICKENING ON ULTRA SOUND: Primary | ICD-10-CM

## 2018-06-25 ENCOUNTER — OFFICE VISIT (OUTPATIENT)
Dept: OBGYN | Facility: CLINIC | Age: 28
End: 2018-06-25
Payer: COMMERCIAL

## 2018-06-25 ENCOUNTER — MYC MEDICAL ADVICE (OUTPATIENT)
Dept: OBGYN | Facility: CLINIC | Age: 28
End: 2018-06-25

## 2018-06-25 VITALS
RESPIRATION RATE: 18 BRPM | TEMPERATURE: 98.4 F | HEIGHT: 62 IN | DIASTOLIC BLOOD PRESSURE: 84 MMHG | HEART RATE: 108 BPM | WEIGHT: 175 LBS | BODY MASS INDEX: 32.2 KG/M2 | SYSTOLIC BLOOD PRESSURE: 132 MMHG

## 2018-06-25 DIAGNOSIS — E28.2 PCOS (POLYCYSTIC OVARIAN SYNDROME): Primary | ICD-10-CM

## 2018-06-25 DIAGNOSIS — E16.1 HYPERINSULINEMIA: ICD-10-CM

## 2018-06-25 PROBLEM — E88.819 INSULIN RESISTANCE: Status: ACTIVE | Noted: 2018-06-25

## 2018-06-25 LAB
INSULIN SERPL-ACNC: 22.4 MU/L (ref 3–25)
PROGEST SERPL-MCNC: 0.4 NG/ML

## 2018-06-25 PROCEDURE — 76830 TRANSVAGINAL US NON-OB: CPT | Performed by: OBSTETRICS & GYNECOLOGY

## 2018-06-25 PROCEDURE — 36415 COLL VENOUS BLD VENIPUNCTURE: CPT | Performed by: OBSTETRICS & GYNECOLOGY

## 2018-06-25 PROCEDURE — 99214 OFFICE O/P EST MOD 30 MIN: CPT | Mod: 25 | Performed by: OBSTETRICS & GYNECOLOGY

## 2018-06-25 PROCEDURE — 84144 ASSAY OF PROGESTERONE: CPT | Performed by: OBSTETRICS & GYNECOLOGY

## 2018-06-25 PROCEDURE — 83525 ASSAY OF INSULIN: CPT | Performed by: OBSTETRICS & GYNECOLOGY

## 2018-06-25 NOTE — PROGRESS NOTES
carmelita is a 27 year old   female who presents for f/u of bloodwork and sonogram requested by Dr. Aguilera last ; she states menarche age 11, always irregular menses; went on DMPA and then BCP, which she stopped  to pursue pregnancy; menses are now irregular again, last 6-7 days, moderately heavy and not sure if she is ovulatory; her bloodwork showed hyperinsulinemia and she started Metformin 2 months ago; she is tolerating 750mg QD without side effects but menses are still not regular; she had a recent sonogram which showed 1.5cm endometrial thickness , a 3.4cm complex right ovarian cyst.  She would like to pursue pregnancy and is taking a prenatal vitamin    Patient Active Problem List    Diagnosis Date Noted     Non morbid obesity, unspecified obesity type 2016     Priority: Medium     Tobacco use disorder 2015     Priority: Medium     Depression 2008     Priority: Medium     Esophageal reflux 2006     Priority: Medium       All systems were reviewed and pertinent information in noted in subjective/HPI.    No past medical history on file.    Past Surgical History:   Procedure Laterality Date     SURGICAL HISTORY OF -       T & A     SURGICAL HISTORY OF -       Left eye surgery         Current Outpatient Prescriptions:      metFORMIN (GLUCOPHAGE-XR) 500 MG 24 hr tablet, Take 1 tablet (500 mg) by mouth daily (with dinner) (Patient taking differently: Take 500 mg by mouth daily (with dinner) ), Disp: 30 tablet, Rfl: 2     metroNIDAZOLE (FLAGYL) 500 MG tablet, Take 1 tablet (500 mg) by mouth 2 times daily (Patient not taking: Reported on 2018), Disp: 14 tablet, Rfl: 0     oxyCODONE-acetaminophen (PERCOCET) 5-325 MG per tablet, Take 1-2 tablets by mouth every 4 hours as needed for pain (Patient not taking: Reported on 10/26/2017), Disp: 4 tablet, Rfl: 0    ALLERGIES:  Review of patient's allergies indicates no known allergies.    Social History     Social History      "Marital status: Single     Spouse name: N/A     Number of children: N/A     Years of education: N/A     Social History Main Topics     Smoking status: Current Every Day Smoker     Packs/day: 0.25     Smokeless tobacco: Never Used      Comment: 1 cig     Alcohol use No     Drug use: No     Sexual activity: Yes     Partners: Male     Birth control/ protection: Condom     Other Topics Concern     None     Social History Narrative       Family History   Problem Relation Age of Onset     Cancer Mother      Cervical     Respiratory Mother      COPD     Respiratory Father      Asthma     Alcohol/Drug Maternal Grandfather      Respiratory Paternal Grandfather      Asthma     Respiratory Brother      Asthma     GASTROINTESTINAL DISEASE Brother      Reflux       OBJECTIVE:  Vitals: /84 (BP Location: Right arm, Patient Position: Chair, Cuff Size: Adult Small)  Pulse 108  Temp 98.4  F (36.9  C) (Tympanic)  Resp 18  Ht 5' 2\" (1.575 m)  Wt 175 lb (79.4 kg)  LMP 06/05/2018  BMI 32.01 kg/m2 BMI= Body mass index is 32.01 kg/(m^2).   Patient's last menstrual period was 06/05/2018.     GENERAL APPEARANCE: alert, no acne; mildly overweight; not hirsuit  ABDOMEN:  soft, nontender, no hepato-splenomegaly or hernias   PELVIC:  EGBUS normal, vagina well estrogenized and supported, no unusual discharge, cervix grossly normal, PAP not taken, uterus normal in size and configuration, adnexa non-tender and not enlarged, rectal normal tone,      Transvaginal sonogram performed: endometrium uniform 1.2cm; not heterogeneous; no free fluid; bilateral ovaries with  Multiple subcortical cysts c/w PCOS; no complex cysts seen    ASSESSMENT:      ICD-10-CM    1. PCOS (polycystic ovarian syndrome) E28.2 Progesterone     US Pelvic Complete with Transvaginal     Insulin level     Progesterone   2. Hyperinsulinemia E16.1 CANCELED: Insulin level       PLAN:  I discussed with Christianne that I suspect her dx of PCOS is inhibiting ovulation and the " cause of her prediabetic condition ; since today is cycle day 20, I suggest an insulin level and progesterone level  If prog c/w ovulation, then can monitor over several cycles, day 21 prog.  If not ovulatory, then would suggest adding Femara day 3-7 each cycle  If Insulin <20, then continue current dose of metformin  If  Insulin >20, will increase dose incrementally to 750mg po BID  Radha Cerda MD  [facility]    Duration of visit:  30 minutes, >50% in discussion of current issues, treatment options and treatment planning.  RADHA Cerda MD

## 2018-06-25 NOTE — MR AVS SNAPSHOT
After Visit Summary   6/25/2018    Christianne Ferrell    MRN: [id number]           Patient Information     Date Of Birth          1990        Visit Information        Provider Department      6/25/2018 9:45 AM Carla Cerda MD [facility]        Today's Diagnoses     PCOS (polycystic ovarian syndrome)    -  1    Hyperinsulinemia           Follow-ups after your visit        Future tests that were ordered for you today     Open Standing Orders        Priority Remaining Interval Expires Ordered    Progesterone Routine 8/8 6/25/2019 6/25/2018            Who to contact     If you have questions or need follow up information about today's clinic visit or your schedule please contact [facility] directly at [phone].  Normal or non-critical lab and imaging results will be communicated to you by [employer], letter or phone within 4 business days after the clinic has received the results. If you do not hear from us within 7 days, please contact the clinic through [employer] or phone. If you have a critical or abnormal lab result, we will notify you by phone as soon as possible.  Submit refill requests through [employer] or call your pharmacy and they will forward the refill request to us. Please allow 3 business days for your refill to be completed.          Additional Information About Your Visit        MyChart Information     [employer] gives you secure access to your electronic health record. If you see a primary care provider, you can also send messages to your care team and make appointments. If you have questions, please call your primary care clinic.  If you do not have a primary care provider, please call [phone] and they will assist you.        Care EveryWhere ID     This is your Care EveryWhere ID. This could be used by other organizations to access your [facility] medical records  [id number]        Your Vitals Were     Pulse Temperature Respirations Height Last Period BMI  "(Body Mass Index)    108 98.4  F (36.9  C) (Tympanic) 18 5' 2\" (1.575 m) 06/05/2018 32.01 kg/m2       Blood Pressure from Last 3 Encounters:   06/25/18 132/84   03/12/18 134/69   11/09/17 128/83    Weight from Last 3 Encounters:   06/25/18 175 lb (79.4 kg)   03/12/18 181 lb (82.1 kg)   11/09/17 186 lb (84.4 kg)              We Performed the Following     Insulin level     Progesterone     US Pelvic Complete with Transvaginal        Primary Care Provider Office Phone # Fax #    Caro Barrios PA-C [phone] [address] [place] [address]        Equal Access to Services     KENNY MENDEZ : Hadlionel Armenta, waaxda luqadaha, qaybta kaalmada sterlingyaskyler, jacqui bledsoe . So [facility] [phone].    ATENCIÓN: Si habla español, tiene a tang disposición servicios gratuitos de asistencia lingüística. Llame al [phone].    We comply with applicable federal civil rights laws and [place] laws. We do not discriminate on the basis of race, color, national origin, age, disability, sex, sexual orientation, or gender identity.            Thank you!     Thank you for choosing [facility]  for your care. Our goal is always to provide you with excellent care. Hearing back from our patients is one way we can continue to improve our services. Please take a few minutes to complete the written survey that you may receive in the mail after your visit with us. Thank you!             Your Updated Medication List - Protect others around you: Learn how to safely use, store and throw away your medicines at www.disposemymeds.org.          This list is accurate as of 6/25/18 10:27 AM.  Always use your most recent med list.                   Brand Name Dispense Instructions for use Diagnosis    metFORMIN 500 MG 24 hr tablet    GLUCOPHAGE-XR    30 tablet    Take 1 tablet (500 mg) by mouth daily (with dinner)    PCOS (polycystic ovarian syndrome)       metroNIDAZOLE 500 " MG tablet    FLAGYL    14 tablet    Take 1 tablet (500 mg) by mouth 2 times daily    BV (bacterial vaginosis)       oxyCODONE-acetaminophen 5-325 MG per tablet    PERCOCET    4 tablet    Take 1-2 tablets by mouth every 4 hours as needed for pain

## 2018-06-27 RX ORDER — LETROZOLE 2.5 MG/1
2.5 TABLET, FILM COATED ORAL DAILY
Qty: 5 TABLET | Refills: 0 | Status: SHIPPED | OUTPATIENT
Start: 2018-06-27 | End: 2018-08-09

## 2018-08-01 ENCOUNTER — MYC MEDICAL ADVICE (OUTPATIENT)
Dept: OBGYN | Facility: CLINIC | Age: 28
End: 2018-08-01

## 2018-08-01 DIAGNOSIS — Z34.90 EARLY STAGE OF PREGNANCY: Primary | ICD-10-CM

## 2018-08-02 DIAGNOSIS — E28.2 PCOS (POLYCYSTIC OVARIAN SYNDROME): ICD-10-CM

## 2018-08-02 DIAGNOSIS — Z34.90 EARLY STAGE OF PREGNANCY: ICD-10-CM

## 2018-08-02 LAB
B-HCG SERPL-ACNC: 231 IU/L (ref 0–5)
PROGEST SERPL-MCNC: 14.4 NG/ML

## 2018-08-02 PROCEDURE — 84144 ASSAY OF PROGESTERONE: CPT | Performed by: OBSTETRICS & GYNECOLOGY

## 2018-08-02 PROCEDURE — 84702 CHORIONIC GONADOTROPIN TEST: CPT | Performed by: OBSTETRICS & GYNECOLOGY

## 2018-08-02 PROCEDURE — 36415 COLL VENOUS BLD VENIPUNCTURE: CPT | Performed by: OBSTETRICS & GYNECOLOGY

## 2018-08-09 ENCOUNTER — APPOINTMENT (OUTPATIENT)
Dept: OBGYN | Facility: CLINIC | Age: 28
End: 2018-08-09
Payer: COMMERCIAL

## 2018-08-09 ENCOUNTER — PRENATAL OFFICE VISIT (OUTPATIENT)
Dept: OBGYN | Facility: CLINIC | Age: 28
End: 2018-08-09

## 2018-08-09 DIAGNOSIS — Z34.00 PRENATAL CARE, FIRST PREGNANCY: Primary | ICD-10-CM

## 2018-08-09 PROCEDURE — 99207 ZZC NO CHARGE NURSE ONLY: CPT | Performed by: OBSTETRICS & GYNECOLOGY

## 2018-08-09 RX ORDER — PRENATAL VIT/IRON FUM/FOLIC AC 27MG-0.8MG
1 TABLET ORAL EVERY EVENING
Status: ON HOLD | COMMUNITY
Start: 2018-06-09 | End: 2019-04-03

## 2018-08-09 NOTE — MR AVS SNAPSHOT
After Visit Summary   8/9/2018    Christianne Ferrell    MRN: [id number]           Patient Information     Date Of Birth          1990        Visit Information        Provider Department      8/9/2018 12:47 PM Carla Cerda MD [facility]        Today's Diagnoses     Prenatal care, first pregnancy    -  1       Follow-ups after your visit        Your next 10 appointments already scheduled     Aug 29, 2018 10:30 AM CDT   New Prenatal with Carla Cerda MD, [facility] 2   [facility] ([facility])    [address] [facility] [address]   [phone]              Who to contact     If you have questions or need follow up information about today's clinic visit or your schedule please contact [facility] directly at [phone].  Normal or non-critical lab and imaging results will be communicated to you by MyChart, letter or phone within 4 business days after the clinic has received the results. If you do not hear from us within 7 days, please contact the clinic through MyChart or phone. If you have a critical or abnormal lab result, we will notify you by phone as soon as possible.  Submit refill requests through [employer] or call your pharmacy and they will forward the refill request to us. Please allow 3 business days for your refill to be completed.          Additional Information About Your Visit        MyChart Information     [employer] gives you secure access to your electronic health record. If you see a primary care provider, you can also send messages to your care team and make appointments. If you have questions, please call your primary care clinic.  If you do not have a primary care provider, please call [phone] and they will assist you.        Care EveryWhere ID     This is your Care EveryWhere ID. This could be used by other organizations to access your [facility] medical records  [id number]        Your  Vitals Were     Last Period                   06/05/2018            Blood Pressure from Last 3 Encounters:   06/25/18 132/84   03/12/18 134/69   11/09/17 128/83    Weight from Last 3 Encounters:   06/25/18 79.4 kg (175 lb)   03/12/18 82.1 kg (181 lb)   11/09/17 84.4 kg (186 lb)              Today, you had the following     No orders found for display       Primary Care Provider Office Phone # Fax #    Caro Barrios PA-C [phone] [address] [address]        Equal Access to Services     [facility] : Hadii césar Armenta, waaxda lutiffanyadaha, qaybta kaalmada brenton, jacqui bledsoe . So [facility] [phone].    ATENCIÓN: Si habla español, tiene a tang disposición servicios gratuitos de asistencia lingüística. Llame al [phone].    We comply with applicable federal civil rights laws and [place] laws. We do not discriminate on the basis of race, color, national origin, age, disability, sex, sexual orientation, or gender identity.            Thank you!     Thank you for choosing [facility]  for your care. Our goal is always to provide you with excellent care. Hearing back from our patients is one way we can continue to improve our services. Please take a few minutes to complete the written survey that you may receive in the mail after your visit with us. Thank you!             Your Updated Medication List - Protect others around you: Learn how to safely use, store and throw away your medicines at www.disposemymeds.org.          This list is accurate as of 8/9/18  1:01 PM.  Always use your most recent med list.                   Brand Name Dispense Instructions for use Diagnosis    prenatal multivitamin plus iron 27-0.8 MG Tabs per tablet      Take 1 tablet by mouth daily

## 2018-08-29 ENCOUNTER — PRENATAL OFFICE VISIT (OUTPATIENT)
Dept: OBGYN | Facility: CLINIC | Age: 28
End: 2018-08-29
Payer: COMMERCIAL

## 2018-08-29 VITALS
TEMPERATURE: 98.6 F | HEIGHT: 62 IN | HEART RATE: 114 BPM | DIASTOLIC BLOOD PRESSURE: 77 MMHG | SYSTOLIC BLOOD PRESSURE: 126 MMHG | WEIGHT: 172 LBS | BODY MASS INDEX: 31.65 KG/M2 | RESPIRATION RATE: 18 BRPM

## 2018-08-29 DIAGNOSIS — E88.819 INSULIN RESISTANCE: ICD-10-CM

## 2018-08-29 DIAGNOSIS — Z34.01 ENCOUNTER FOR PRENATAL CARE IN FIRST TRIMESTER OF FIRST PREGNANCY: Primary | ICD-10-CM

## 2018-08-29 LAB
ABO + RH BLD: NORMAL
ABO + RH BLD: NORMAL
ALBUMIN UR-MCNC: ABNORMAL MG/DL
AMPHETAMINES UR QL: NOT DETECTED NG/ML
APPEARANCE UR: CLEAR
BACTERIA #/AREA URNS HPF: ABNORMAL /HPF
BARBITURATES UR QL SCN: NOT DETECTED NG/ML
BENZODIAZ UR QL SCN: NOT DETECTED NG/ML
BILIRUB UR QL STRIP: NEGATIVE
BLD GP AB SCN SERPL QL: NORMAL
BLOOD BANK CMNT PATIENT-IMP: NORMAL
BUPRENORPHINE UR QL: NOT DETECTED NG/ML
CANNABINOIDS UR QL: ABNORMAL NG/ML
COCAINE UR QL SCN: NOT DETECTED NG/ML
COLOR UR AUTO: YELLOW
D-METHAMPHET UR QL: NOT DETECTED NG/ML
ERYTHROCYTE [DISTWIDTH] IN BLOOD BY AUTOMATED COUNT: 12.2 % (ref 10–15)
GLUCOSE UR STRIP-MCNC: NEGATIVE MG/DL
HBA1C MFR BLD: 5.2 % (ref 0–5.6)
HCT VFR BLD AUTO: 39.9 % (ref 35–47)
HGB BLD-MCNC: 14.1 G/DL (ref 11.7–15.7)
HGB UR QL STRIP: NEGATIVE
KETONES UR STRIP-MCNC: NEGATIVE MG/DL
LEUKOCYTE ESTERASE UR QL STRIP: NEGATIVE
MCH RBC QN AUTO: 31.4 PG (ref 26.5–33)
MCHC RBC AUTO-ENTMCNC: 35.3 G/DL (ref 31.5–36.5)
MCV RBC AUTO: 89 FL (ref 78–100)
METHADONE UR QL SCN: NOT DETECTED NG/ML
NITRATE UR QL: NEGATIVE
NON-SQ EPI CELLS #/AREA URNS LPF: ABNORMAL /LPF
OPIATES UR QL SCN: NOT DETECTED NG/ML
OXYCODONE UR QL SCN: NOT DETECTED NG/ML
PCP UR QL SCN: NOT DETECTED NG/ML
PH UR STRIP: 6 PH (ref 5–7)
PLATELET # BLD AUTO: 299 10E9/L (ref 150–450)
PROPOXYPH UR QL: NOT DETECTED NG/ML
RBC # BLD AUTO: 4.49 10E12/L (ref 3.8–5.2)
RBC #/AREA URNS AUTO: ABNORMAL /HPF
SOURCE: ABNORMAL
SP GR UR STRIP: 1.02 (ref 1–1.03)
SPECIMEN EXP DATE BLD: NORMAL
TRICYCLICS UR QL SCN: NOT DETECTED NG/ML
UROBILINOGEN UR STRIP-ACNC: 0.2 EU/DL (ref 0.2–1)
WBC # BLD AUTO: 11.2 10E9/L (ref 4–11)
WBC #/AREA URNS AUTO: ABNORMAL /HPF

## 2018-08-29 PROCEDURE — 81001 URINALYSIS AUTO W/SCOPE: CPT | Mod: 59 | Performed by: OBSTETRICS & GYNECOLOGY

## 2018-08-29 PROCEDURE — 87340 HEPATITIS B SURFACE AG IA: CPT | Performed by: OBSTETRICS & GYNECOLOGY

## 2018-08-29 PROCEDURE — 83036 HEMOGLOBIN GLYCOSYLATED A1C: CPT | Performed by: OBSTETRICS & GYNECOLOGY

## 2018-08-29 PROCEDURE — 86780 TREPONEMA PALLIDUM: CPT | Performed by: OBSTETRICS & GYNECOLOGY

## 2018-08-29 PROCEDURE — 85027 COMPLETE CBC AUTOMATED: CPT | Performed by: OBSTETRICS & GYNECOLOGY

## 2018-08-29 PROCEDURE — 87491 CHLMYD TRACH DNA AMP PROBE: CPT | Performed by: OBSTETRICS & GYNECOLOGY

## 2018-08-29 PROCEDURE — 36415 COLL VENOUS BLD VENIPUNCTURE: CPT | Performed by: OBSTETRICS & GYNECOLOGY

## 2018-08-29 PROCEDURE — 99213 OFFICE O/P EST LOW 20 MIN: CPT | Mod: 25 | Performed by: OBSTETRICS & GYNECOLOGY

## 2018-08-29 PROCEDURE — 87389 HIV-1 AG W/HIV-1&-2 AB AG IA: CPT | Performed by: OBSTETRICS & GYNECOLOGY

## 2018-08-29 PROCEDURE — 80306 DRUG TEST PRSMV INSTRMNT: CPT | Performed by: OBSTETRICS & GYNECOLOGY

## 2018-08-29 PROCEDURE — 86900 BLOOD TYPING SEROLOGIC ABO: CPT | Performed by: OBSTETRICS & GYNECOLOGY

## 2018-08-29 PROCEDURE — 87591 N.GONORRHOEAE DNA AMP PROB: CPT | Performed by: OBSTETRICS & GYNECOLOGY

## 2018-08-29 PROCEDURE — 86850 RBC ANTIBODY SCREEN: CPT | Performed by: OBSTETRICS & GYNECOLOGY

## 2018-08-29 PROCEDURE — 86901 BLOOD TYPING SEROLOGIC RH(D): CPT | Performed by: OBSTETRICS & GYNECOLOGY

## 2018-08-29 PROCEDURE — 86762 RUBELLA ANTIBODY: CPT | Performed by: OBSTETRICS & GYNECOLOGY

## 2018-08-29 PROCEDURE — 76817 TRANSVAGINAL US OBSTETRIC: CPT | Performed by: OBSTETRICS & GYNECOLOGY

## 2018-08-29 PROCEDURE — 87086 URINE CULTURE/COLONY COUNT: CPT | Performed by: OBSTETRICS & GYNECOLOGY

## 2018-08-29 NOTE — PROGRESS NOTES
Discussed physician coverage, tertiary support, diet, exercise, weight gain, schedule of visits, routine and indicated ultrasounds, childbirth education and antepartum testing for certain birth defects.  Encouraged patient to review contents of Prenatal Breastfeeding Education Toolkit. Offered opportunity to answer questions regarding the importance of skin to skin contact, early initiation of exclusive breastfeeding for the first six months and rooming in while in the hospital.  Discussed [facility] travel advisory for Zika virus.  Syphilis is a sexually transmitted disease that can cause birth defects in the babies of untreated mothers. Every pregnant patient is tested for syphilis early in each pregnancy as part of the routine lab work. The [place] Department of [facility] has seen an increase in the rate of syphilis in [place]. The [facility] now recommends testing for syphilis 3 times during a pregnancy, the new prenatal visit, 28 weeks and when admitted for delivery. Patient accepts lab testing for syphilis.        Options for  testing for birth defects were discussed with the patient, generally including CVS testing, Quad screen serum testing, nuchal lucency/blood marker testing, genetic amniocentesis, Verifi testing  and/or level 2 ultrasound.    Current issues include: nausea, mild  Dates unknown; did not take Femara    Past medical, surgical, social and family histories reviewed on OB questionaire and included on episode summary.  Pertinent review of systems items stated above. See OB questionaire for pertinent components of HPI.    Past Medical History:   Diagnosis Date     Chlamydia      See epic chart    Past Surgical History:   Procedure Laterality Date     SURGICAL HISTORY OF -       T & A     SURGICAL HISTORY OF -       Left eye surgery     See epic chart    Patient Active Problem List    Diagnosis Date Noted     Prenatal care, first pregnancy 2018     Priority: Medium     MEENA Geiger    "    Insulin resistance 06/25/2018     Priority: Medium     Metformin 750mg QD>>750mg po BID 6/25/2018/ stopped Metformin when conceived         PCOS (polycystic ovarian syndrome) 06/25/2018     Priority: Medium     On Metformin; day 21 prog low  PLAN: Femara  Cycle #1 Femara 2.5mg po day 3-7; day 21 prog/ CONCEIVED PRIOR TO TAKING FEMARA       Non morbid obesity, unspecified obesity type 08/31/2016     Priority: Medium     Tobacco use disorder 08/28/2015     Priority: Medium     Depression 11/18/2008     Priority: Medium     Esophageal reflux 03/22/2006     Priority: Medium       OBJECTIVE: /77 (BP Location: Right arm, Patient Position: Chair, Cuff Size: Adult Small)  Pulse 114  Temp 98.6  F (37  C) (Tympanic)  Resp 18  Ht 5' 2\" (1.575 m)  Wt 172 lb (78 kg)  LMP 06/05/2018  BMI 31.46 kg/m2    GENERAL APPEARANCE: healthy, alert and no distress  ABDOMEN:  soft, nontender, no hepato-splenomegaly or hernias  PELVIC:  EGBUS:  within normal limits  VAGINA:  normoestrogenic, well-supported, no unusual discharge  CERVIX:  smooth, non-friable, no gross lesions, thin-layer PAP was not taken  UTERUS:  anteverted and enlarged, 8 weeks size  ADNEXAE:  non-tender, no masses palpable, no cul de sac nodularity     NECK: no adenopathy, no asymmetry, masses, or scars and thyroid normal to palpation     RESP: lungs clear to auscultation , respiratory effort WNL     CV: regular rates and rhythm, normal S1 S2, no S3 or S4 and no murmur, click or rub -     SKIN: no suspicious lesions or rashes     PSYCH: mentation appears normal. and affect normal/bright, oriented to person/place/time     LYMPHATICS: No axillary, cervical, inguinal, or supraclavicular nodes    Transvaginal ultrasound was performed. A live single intrauterine pregnancy was seen.  CRL=1.21 cm, c/w 7 weeks, 3 days.  EDC by sono =4/14/19    Fetal heart motion was visualized.            ASSESSMENT:    ICD-10-CM    1. Encounter for prenatal care in first trimester " of first pregnancy Z34.01 CBC with platelets     ABO/Rh type and screen     Hepatitis B surface antigen     Rubella Antibody IgG Quantitative     Urine Culture Aerobic Bacterial     *UA reflex to Microscopic     HIV Antigen Antibody Combo     US OB <14 Weeks w Transvaginal Single     Urine Drugs of Abuse Screen Panel 13     Treponema Abs w Reflex to RPR and Titer     Chlamydia trachomatis PCR     Neisseria gonorrhoeae PCR     Hemoglobin A1c   2. Insulin resistance E88.81 CBC with platelets     ABO/Rh type and screen     Hepatitis B surface antigen     Rubella Antibody IgG Quantitative     Urine Culture Aerobic Bacterial     *UA reflex to Microscopic     HIV Antigen Antibody Combo     US OB <14 Weeks w Transvaginal Single     Urine Drugs of Abuse Screen Panel 13     Treponema Abs w Reflex to RPR and Titer     Chlamydia trachomatis PCR     Neisseria gonorrhoeae PCR     Hemoglobin A1c         PLAN: see orders and OB problem list annotations    Carla Sanchezicle

## 2018-08-29 NOTE — MR AVS SNAPSHOT
After Visit Summary   8/29/2018    Christianne Ferrell    MRN: [id number]           Patient Information     Date Of Birth          1990        Visit Information        Provider Department      8/29/2018 10:30 AM Carla Cerda MD; [facility] 2 [facility]        Today's Diagnoses     Encounter for prenatal care in first trimester of first pregnancy    -  1    Insulin resistance           Follow-ups after your visit        Your next 10 appointments already scheduled     Sep 26, 2018 11:30 AM CDT   ESTABLISHED PRENATAL with Carla Cerda MD   [facility] ([facility])    [address] [facility] [address]   [phone]              Who to contact     If you have questions or need follow up information about today's clinic visit or your schedule please contact [facility] directly at [phone].  Normal or non-critical lab and imaging results will be communicated to you by MyChart, letter or phone within 4 business days after the clinic has received the results. If you do not hear from us within 7 days, please contact the clinic through [employer] or phone. If you have a critical or abnormal lab result, we will notify you by phone as soon as possible.  Submit refill requests through [employer] or call your pharmacy and they will forward the refill request to us. Please allow 3 business days for your refill to be completed.          Additional Information About Your Visit        MyChart Information     [employer] gives you secure access to your electronic health record. If you see a primary care provider, you can also send messages to your care team and make appointments. If you have questions, please call your primary care clinic.  If you do not have a primary care provider, please call [phone] and they will assist you.        Care EveryWhere ID     This is your Care EveryWhere ID. This could be used by other  "organizations to access your [place] medical records  [id number]        Your Vitals Were     Pulse Temperature Respirations Height Last Period BMI (Body Mass Index)    114 98.6  F (37  C) (Tympanic) 18 5' 2\" (1.575 m) 06/05/2018 31.46 kg/m2       Blood Pressure from Last 3 Encounters:   08/29/18 126/77   06/25/18 132/84   03/12/18 134/69    Weight from Last 3 Encounters:   08/29/18 172 lb (78 kg)   06/25/18 175 lb (79.4 kg)   03/12/18 181 lb (82.1 kg)              We Performed the Following     *UA reflex to Microscopic     ABO/Rh type and screen     CBC with platelets     Chlamydia trachomatis PCR     Hemoglobin A1c     Hepatitis B surface antigen     HIV Antigen Antibody Combo     Neisseria gonorrhoeae PCR     Rubella Antibody IgG Quantitative     Treponema Abs w Reflex to RPR and Titer     Urine Culture Aerobic Bacterial     Urine Drugs of Abuse Screen Panel 13     Urine Microscopic     US OB <14 Weeks w Transvaginal Single        Primary Care Provider Office Phone # Fax #    Caro Barrios PA-C [phone] [address] [place] [address]        Equal Access to Services     BERTA MENDEZ : Hadii aad ku hadasho Soomaali, waaxda luqadaha, qaybta kaalmada adeegyada, waxay niteshin hayprashantn adeayanna green lamirna ah. So [facility] [phone].    ATENCIÓN: Si habla español, tiene a tang disposición servicios gratuitos de asistencia lingüística. Llame al [phone].    We comply with applicable federal civil rights laws and [place] laws. We do not discriminate on the basis of race, color, national origin, age, disability, sex, sexual orientation, or gender identity.            Thank you!     Thank you for choosing [facility]  for your care. Our goal is always to provide you with excellent care. Hearing back from our patients is one way we can continue to improve our services. Please take a few minutes to complete the written survey that you may receive in the mail after your visit " with us. Thank you!             Your Updated Medication List - Protect others around you: Learn how to safely use, store and throw away your medicines at www.disposemymeds.org.          This list is accurate as of 8/29/18 12:12 PM.  Always use your most recent med list.                   Brand Name Dispense Instructions for use Diagnosis    prenatal multivitamin plus iron 27-0.8 MG Tabs per tablet      Take 1 tablet by mouth daily

## 2018-08-30 LAB
BACTERIA SPEC CULT: NORMAL
HBV SURFACE AG SERPL QL IA: NONREACTIVE
HIV 1+2 AB+HIV1 P24 AG SERPL QL IA: NONREACTIVE
Lab: NORMAL
RUBV IGG SERPL IA-ACNC: 9 IU/ML
SPECIMEN SOURCE: NORMAL
T PALLIDUM AB SER QL: NONREACTIVE

## 2018-09-21 ENCOUNTER — ALLIED HEALTH/NURSE VISIT (OUTPATIENT)
Dept: OBGYN | Facility: CLINIC | Age: 28
End: 2018-09-21
Payer: COMMERCIAL

## 2018-09-21 DIAGNOSIS — Z34.01 ENCOUNTER FOR PRENATAL CARE IN FIRST TRIMESTER OF FIRST PREGNANCY: Primary | ICD-10-CM

## 2018-09-21 PROCEDURE — 40000791 ZZHCL STATISTIC VERIFI PRENATAL TRISOMY 21,18,13: Mod: 90 | Performed by: OBSTETRICS & GYNECOLOGY

## 2018-09-21 PROCEDURE — 99207 ZZC NO CHARGE NURSE ONLY: CPT

## 2018-09-21 PROCEDURE — 36415 COLL VENOUS BLD VENIPUNCTURE: CPT | Performed by: OBSTETRICS & GYNECOLOGY

## 2018-09-21 PROCEDURE — 99000 SPECIMEN HANDLING OFFICE-LAB: CPT | Performed by: OBSTETRICS & GYNECOLOGY

## 2018-09-21 NOTE — MR AVS SNAPSHOT
After Visit Summary   9/21/2018    Christianne Ferrell    MRN: [id number]           Patient Information     Date Of Birth          1990        Visit Information        Provider Department      9/21/2018 9:00 AM Inderjit Mann Nurse - [facility]        Today's Diagnoses     Encounter for prenatal care in first trimester of first pregnancy    -  1       Follow-ups after your visit        Your next 10 appointments already scheduled     Sep 26, 2018 11:30 AM CDT   ESTABLISHED PRENATAL with Carla Cerda MD   [facility] ([facility])    [address] [facility] [address]   [phone]              Who to contact     If you have questions or need follow up information about today's clinic visit or your schedule please contact [facility] directly at [phone].  Normal or non-critical lab and imaging results will be communicated to you by MyChart, letter or phone within 4 business days after the clinic has received the results. If you do not hear from us within 7 days, please contact the clinic through [employer] or phone. If you have a critical or abnormal lab result, we will notify you by phone as soon as possible.  Submit refill requests through [employer] or call your pharmacy and they will forward the refill request to us. Please allow 3 business days for your refill to be completed.          Additional Information About Your Visit        MyChart Information     [employer] gives you secure access to your electronic health record. If you see a primary care provider, you can also send messages to your care team and make appointments. If you have questions, please call your primary care clinic.  If you do not have a primary care provider, please call [phone] and they will assist you.        Care EveryWhere ID     This is your Care EveryWhere ID. This could be used by other organizations to access your [facility]  records  [id number]        Your Vitals Were     Last Period                   06/05/2018            Blood Pressure from Last 3 Encounters:   08/29/18 126/77   06/25/18 132/84   03/12/18 134/69    Weight from Last 3 Encounters:   08/29/18 172 lb (78 kg)   06/25/18 175 lb (79.4 kg)   03/12/18 181 lb (82.1 kg)              We Performed the Following     Non Invasive Prenatal Test Cell Free DNA        Primary Care Provider Office Phone # Fax #    Caro Barrios PA-C [phone] [address] [facility] [address]        Equal Access to Services     KENNY MENDEZ : Hadii césar Armenta, waaxda enmanuel, qaybta kaalmada brenton, jacqui bledsoe . So [facility] [phone].    ATENCIÓN: Si habla español, tiene a tnag disposición servicios gratuitos de asistencia lingüística. Llame al [phone].    We comply with applicable federal civil rights laws and [place] laws. We do not discriminate on the basis of race, color, national origin, age, disability, sex, sexual orientation, or gender identity.            Thank you!     Thank you for choosing [facility]  for your care. Our goal is always to provide you with excellent care. Hearing back from our patients is one way we can continue to improve our services. Please take a few minutes to complete the written survey that you may receive in the mail after your visit with us. Thank you!             Your Updated Medication List - Protect others around you: Learn how to safely use, store and throw away your medicines at www.disposemymeds.org.          This list is accurate as of 9/21/18  9:24 AM.  Always use your most recent med list.                   Brand Name Dispense Instructions for use Diagnosis    prenatal multivitamin plus iron 27-0.8 MG Tabs per tablet      Take 1 tablet by mouth daily

## 2018-09-26 ENCOUNTER — PRENATAL OFFICE VISIT (OUTPATIENT)
Dept: OBGYN | Facility: CLINIC | Age: 28
End: 2018-09-26
Payer: COMMERCIAL

## 2018-09-26 ENCOUNTER — TELEPHONE (OUTPATIENT)
Dept: OBGYN | Facility: CLINIC | Age: 28
End: 2018-09-26

## 2018-09-26 VITALS
TEMPERATURE: 98.6 F | RESPIRATION RATE: 18 BRPM | WEIGHT: 175 LBS | SYSTOLIC BLOOD PRESSURE: 118 MMHG | DIASTOLIC BLOOD PRESSURE: 65 MMHG | BODY MASS INDEX: 32.2 KG/M2 | HEIGHT: 62 IN | HEART RATE: 111 BPM

## 2018-09-26 DIAGNOSIS — Z23 NEED FOR PROPHYLACTIC VACCINATION AND INOCULATION AGAINST INFLUENZA: ICD-10-CM

## 2018-09-26 DIAGNOSIS — Z34.01 ENCOUNTER FOR PRENATAL CARE IN FIRST TRIMESTER OF FIRST PREGNANCY: Primary | ICD-10-CM

## 2018-09-26 PROCEDURE — 90686 IIV4 VACC NO PRSV 0.5 ML IM: CPT | Performed by: OBSTETRICS & GYNECOLOGY

## 2018-09-26 PROCEDURE — 90471 IMMUNIZATION ADMIN: CPT | Performed by: OBSTETRICS & GYNECOLOGY

## 2018-09-26 PROCEDURE — 99212 OFFICE O/P EST SF 10 MIN: CPT | Mod: 25 | Performed by: OBSTETRICS & GYNECOLOGY

## 2018-09-26 NOTE — TELEPHONE ENCOUNTER
Call to patient to notify of test results from [facility].  Unable to reach.  Left message for patient to return call to clinic.    Testing was normal - no abnormality detected.  Sex Chromosomes indicate ( XX) for GIRL!    Neyda Gonzalez   Ob[facility]Gyn Clinic  RN

## 2018-09-26 NOTE — MR AVS SNAPSHOT
After Visit Summary   9/26/2018    Christianne Ferrell    MRN: [id number]           Patient Information     Date Of Birth          1990        Visit Information        Provider Department      9/26/2018 11:30 AM Carla Creda MD [facility]        Today's Diagnoses     Encounter for prenatal care in first trimester of first pregnancy    -  1    Need for prophylactic vaccination and inoculation against influenza           Follow-ups after your visit        Your next 10 appointments already scheduled     Oct 25, 2018 10:15 AM CDT   ESTABLISHED PRENATAL with Carla Cerda MD   [facility] ([facility])    [address] [facility] [address]   [phone]              Who to contact     If you have questions or need follow up information about today's clinic visit or your schedule please contact [facility] directly at [phone].  Normal or non-critical lab and imaging results will be communicated to you by MyChart, letter or phone within 4 business days after the clinic has received the results. If you do not hear from us within 7 days, please contact the clinic through [employer] or phone. If you have a critical or abnormal lab result, we will notify you by phone as soon as possible.  Submit refill requests through [employer] or call your pharmacy and they will forward the refill request to us. Please allow 3 business days for your refill to be completed.          Additional Information About Your Visit        MyChart Information     [employer] gives you secure access to your electronic health record. If you see a primary care provider, you can also send messages to your care team and make appointments. If you have questions, please call your primary care clinic.  If you do not have a primary care provider, please call [phone] and they will assist you.        Care EveryWhere ID     This is your Care EveryWhere ID. This  "could be used by other organizations to access your [facility] medical records  [id number]        Your Vitals Were     Pulse Temperature Respirations Height Last Period Breastfeeding?    111 98.6  F (37  C) (Tympanic) 18 5' 2\" (1.575 m) 06/05/2018 Yes    BMI (Body Mass Index)                   32.01 kg/m2            Blood Pressure from Last 3 Encounters:   09/26/18 118/65   08/29/18 126/77   06/25/18 132/84    Weight from Last 3 Encounters:   09/26/18 175 lb (79.4 kg)   08/29/18 172 lb (78 kg)   06/25/18 175 lb (79.4 kg)              We Performed the Following     FLU VACCINE, SPLIT VIRUS, IM (QUADRIVALENT) [18783]- >3 YRS     Vaccine Administration, Initial [34793]        Primary Care Provider Office Phone # Fax #    Caro Barrios PA-C [phone] [address] [place], [place] [address]        Equal Access to Services     BERTA MENDEZ : Hadii aad ku hadasho Soomaali, waaxda luqadaha, qaybta kaalmada adeegyada, waxay niteshin edward bledsoe . So [facility] [phone].    ATENCIÓN: Si habla español, tiene a tang disposición servicios gratuitos de asistencia lingüística. [facility] [phone].    We comply with applicable federal civil rights laws and [place] laws. We do not discriminate on the basis of race, color, national origin, age, disability, sex, sexual orientation, or gender identity.            Thank you!     Thank you for choosing [facility]  for your care. Our goal is always to provide you with excellent care. Hearing back from our patients is one way we can continue to improve our services. Please take a few minutes to complete the written survey that you may receive in the mail after your visit with us. Thank you!             Your Updated Medication List - Protect others around you: Learn how to safely use, store and throw away your medicines at www.disposemymeds.org.          This list is accurate as of 9/26/18 12:04 PM.  Always use your most recent med " list.                   Brand Name Dispense Instructions for use Diagnosis    prenatal multivitamin plus iron 27-0.8 MG Tabs per tablet      Take 1 tablet by mouth daily

## 2018-09-26 NOTE — PROGRESS NOTES
"CC: Here for routine prenatal visit @ 11w3d   HPI:  Reviewed lab results; pos THC on urine--advised that not recommended in pregnancy; flu shot advised    PE: /65 (BP Location: Left arm, Patient Position: Chair, Cuff Size: Adult Small)  Pulse 111  Temp 98.6  F (37  C) (Tympanic)  Resp 18  Ht 5' 2\" (1.575 m)  Wt 175 lb (79.4 kg)  LMP 06/05/2018  Breastfeeding? Yes  BMI 32.01 kg/m2   See OB flowsheet      A:  1. Encounter for prenatal care in first trimester of first pregnancy      2. Need for prophylactic vaccination and inoculation against influenza    - FLU VACCINE, SPLIT VIRUS, IM (QUADRIVALENT) [71045]- >3 YRS  - Vaccine Administration, Initial [71858]      Routine prenatal care  RTC 4 weeks.      Carla Cerda M.D.     "

## 2018-09-26 NOTE — TELEPHONE ENCOUNTER
Patient returned call. Information given below.  No further questions verbalized      Mario GONZALEZ RN   [facility] Clin[facility]

## 2018-09-26 NOTE — PROGRESS NOTES

## 2018-09-30 LAB — LAB SCANNED RESULT: NORMAL

## 2018-10-01 NOTE — PROGRESS NOTES
Inform patient that her progeniti test returned negative. If she wants the gender revealed, you can let her know as well.     Leland Fortune MD  [facility]

## 2018-10-25 ENCOUNTER — PRENATAL OFFICE VISIT (OUTPATIENT)
Dept: OBGYN | Facility: CLINIC | Age: 28
End: 2018-10-25
Payer: COMMERCIAL

## 2018-10-25 VITALS
SYSTOLIC BLOOD PRESSURE: 124 MMHG | TEMPERATURE: 98.5 F | WEIGHT: 176 LBS | BODY MASS INDEX: 32.39 KG/M2 | HEIGHT: 62 IN | DIASTOLIC BLOOD PRESSURE: 63 MMHG | RESPIRATION RATE: 18 BRPM | HEART RATE: 111 BPM

## 2018-10-25 DIAGNOSIS — Z3A.15 15 WEEKS GESTATION OF PREGNANCY: Primary | ICD-10-CM

## 2018-10-25 PROCEDURE — 99212 OFFICE O/P EST SF 10 MIN: CPT | Performed by: OBSTETRICS & GYNECOLOGY

## 2018-10-25 NOTE — PROGRESS NOTES
"CC: Here for routine prenatal visit @ 15w4d   HPI:  Feeling well; no complaints; getting sufficient dairy products/calcium/Vit D    PE: /63 (BP Location: Right arm, Patient Position: Chair, Cuff Size: Adult Small)  Pulse 111  Temp 98.5  F (36.9  C) (Tympanic)  Resp 18  Ht 5' 2\" (1.575 m)  Wt 176 lb (79.8 kg)  LMP 06/05/2018  BMI 32.19 kg/m2   See OB flowsheet      A:  1. 15 weeks gestation of pregnancy    - US OB > 14 Weeks; Future      Routine prenatal care  RTC 4 weeks.      Carla Cerda M.D.     "

## 2018-10-25 NOTE — MR AVS SNAPSHOT
After Visit Summary   10/25/2018    Christianne Ferrell    MRN: [id number]           Patient Information     Date Of Birth          1990        Visit Information        Provider Department      10/25/2018 10:15 AM Carla Cerda MD [facility]        Today's Diagnoses     15 weeks gestation of pregnancy    -  1       Follow-ups after your visit        Future tests that were ordered for you today     Open Future Orders        Priority Expected Expires Ordered    US OB > 14 Weeks Routine  10/25/2019 10/25/2018            Who to contact     If you have questions or need follow up information about today's clinic visit or your schedule please contact [facility] directly at [phone].  Normal or non-critical lab and imaging results will be communicated to you by MyChart, letter or phone within 4 business days after the clinic has received the results. If you do not hear from us within 7 days, please contact the clinic through [employer] or phone. If you have a critical or abnormal lab result, we will notify you by phone as soon as possible.  Submit refill requests through [employer] or call your pharmacy and they will forward the refill request to us. Please allow 3 business days for your refill to be completed.          Additional Information About Your Visit        MyChart Information     [employer] gives you secure access to your electronic health record. If you see a primary care provider, you can also send messages to your care team and make appointments. If you have questions, please call your primary care clinic.  If you do not have a primary care provider, please call [phone] and they will assist you.        Care EveryWhere ID     This is your Care EveryWhere ID. This could be used by other organizations to access your [facility] medical records  [id number]        Your Vitals Were     Pulse Temperature Respirations Height Last Period BMI (Body Mass Index)    111  "98.5  F (36.9  C) (Tympanic) 18 5' 2\" (1.575 m) 06/05/2018 32.19 kg/m2       Blood Pressure from Last 3 Encounters:   10/25/18 124/63   09/26/18 118/65   08/29/18 126/77    Weight from Last 3 Encounters:   10/25/18 176 lb (79.8 kg)   09/26/18 175 lb (79.4 kg)   08/29/18 172 lb (78 kg)               Primary Care Provider Office Phone # Fax #    Caro Barrios PA-C [phone] [address] [place], [place] [address]        Equal Access to Services     KENNY MENDEZ : Karina Armenta, wabakari singer, qaybta kaalmada brenton, jacqui farooq. So [facility] [phone].    ATENCIÓN: Si habla español, tiene a tang disposición servicios gratuitos de asistencia lingüística. Llame al [phone].    We comply with applicable federal civil rights laws and [place] laws. We do not discriminate on the basis of race, color, national origin, age, disability, sex, sexual orientation, or gender identity.            Thank you!     Thank you for choosing [facility]  for your care. Our goal is always to provide you with excellent care. Hearing back from our patients is one way we can continue to improve our services. Please take a few minutes to complete the written survey that you may receive in the mail after your visit with us. Thank you!             Your Updated Medication List - Protect others around you: Learn how to safely use, store and throw away your medicines at www.disposemymeds.org.          This list is accurate as of 10/25/18 10:34 AM.  Always use your most recent med list.                   Brand Name Dispense Instructions for use Diagnosis    prenatal multivitamin plus iron 27-0.8 MG Tabs per tablet      Take 1 tablet by mouth daily          "

## 2019-01-07 ENCOUNTER — HOSPITAL ENCOUNTER (OUTPATIENT)
Dept: ULTRASOUND IMAGING | Facility: CLINIC | Age: 29
Discharge: HOME | End: 2019-01-07
Attending: OBSTETRICS & GYNECOLOGY | Admitting: OBSTETRICS & GYNECOLOGY
Payer: COMMERCIAL

## 2019-01-07 ENCOUNTER — PRENATAL OFFICE VISIT (OUTPATIENT)
Dept: OBGYN | Facility: CLINIC | Age: 29
End: 2019-01-07

## 2019-01-07 VITALS
HEART RATE: 99 BPM | WEIGHT: 186 LBS | DIASTOLIC BLOOD PRESSURE: 66 MMHG | TEMPERATURE: 97.8 F | BODY MASS INDEX: 34.02 KG/M2 | SYSTOLIC BLOOD PRESSURE: 125 MMHG

## 2019-01-07 DIAGNOSIS — Z3A.15 15 WEEKS GESTATION OF PREGNANCY: ICD-10-CM

## 2019-01-07 DIAGNOSIS — Z34.02 ENCOUNTER FOR PRENATAL CARE IN SECOND TRIMESTER OF FIRST PREGNANCY: Primary | ICD-10-CM

## 2019-01-07 PROCEDURE — 76805 OB US >/= 14 WKS SNGL FETUS: CPT

## 2019-01-07 PROCEDURE — 99207 ZZC PRENATAL VISIT: CPT | Performed by: OBSTETRICS & GYNECOLOGY

## 2019-01-07 NOTE — NURSING NOTE
"Initial /66 (BP Location: Left arm, Patient Position: Chair, Cuff Size: Adult Regular)   Pulse 99   Temp 97.8  F (36.6  C) (Tympanic)   Wt 84.4 kg (186 lb)   LMP 06/05/2018   BMI 34.02 kg/m   Estimated body mass index is 34.02 kg/m  as calculated from the following:    Height as of 10/25/18: 1.575 m (5' 2\").    Weight as of this encounter: 84.4 kg (186 lb). .    Janette Roberson    "

## 2019-01-07 NOTE — PROGRESS NOTES
Doing well.   Just returned from her anatomy US appointment  No complaints.   Denies VB, ctx, LOF. +FM  /66 (BP Location: Left arm, Patient Position: Chair, Cuff Size: Adult Regular)   Pulse 99   Temp 97.8  F (36.6  C) (Tympanic)   Wt 84.4 kg (186 lb)   LMP 2018   BMI 34.02 kg/m    General Appearance: NAD  Abdomen: Gravid, NT  Refer to flow sheet above.   A/P: 28 year old  at 26w1d  -- fetal anatomy US report pending  -- 1 hr GCT, CBC and syphilis testing ordered  -- PTL precautions reviewed  RTC in 2 weeks    Leland Fortune MD  [facility]

## 2019-01-30 ENCOUNTER — PRENATAL OFFICE VISIT (OUTPATIENT)
Dept: OBGYN | Facility: CLINIC | Age: 29
End: 2019-01-30
Payer: COMMERCIAL

## 2019-01-30 VITALS
SYSTOLIC BLOOD PRESSURE: 134 MMHG | TEMPERATURE: 97.8 F | RESPIRATION RATE: 18 BRPM | HEART RATE: 129 BPM | WEIGHT: 187 LBS | DIASTOLIC BLOOD PRESSURE: 79 MMHG | HEIGHT: 62 IN | BODY MASS INDEX: 34.41 KG/M2

## 2019-01-30 DIAGNOSIS — Z23 NEED FOR TDAP VACCINATION: ICD-10-CM

## 2019-01-30 DIAGNOSIS — Z34.03 ENCOUNTER FOR PRENATAL CARE IN THIRD TRIMESTER OF FIRST PREGNANCY: Primary | ICD-10-CM

## 2019-01-30 PROCEDURE — 90471 IMMUNIZATION ADMIN: CPT | Performed by: OBSTETRICS & GYNECOLOGY

## 2019-01-30 PROCEDURE — 90715 TDAP VACCINE 7 YRS/> IM: CPT | Performed by: OBSTETRICS & GYNECOLOGY

## 2019-01-30 PROCEDURE — 99207 ZZC PRENATAL VISIT: CPT | Performed by: OBSTETRICS & GYNECOLOGY

## 2019-01-30 ASSESSMENT — MIFFLIN-ST. JEOR: SCORE: 1541.48

## 2019-01-30 NOTE — NURSING NOTE
Screening Questionnaire for Adult Immunization    Are you sick today?   No   Do you have allergies to medications, food, a vaccine component or latex?   No   Have you ever had a serious reaction after receiving a vaccination?   No   Do you have a long-term health problem with heart disease, lung disease, asthma, kidney disease, metabolic disease (e.g. diabetes), anemia, or other blood disorder?   No   Do you have cancer, leukemia, HIV/AIDS, or any other immune system problem?   No   In the past 3 months, have you taken medications that affect  your immune system, such as prednisone, other steroids, or anticancer drugs; drugs for the treatment of rheumatoid arthritis, Crohn s disease, or psoriasis; or have you had radiation treatments?   No   Have you had a seizure, or a brain or other nervous system problem?   No   During the past year, have you received a transfusion of blood or blood     products, or been given immune (gamma) globulin or antiviral drug?   No   For women: Are you pregnant or is there a chance you could become        pregnant during the next month?   yes   Have you received any vaccinations in the past 4 weeks?   No     Immunization questionnaire answers were all negative.        Per orders of Dr. Cerda, injection of adacel given by Justina Murillo. Patient instructed to remain in clinic for 15 minutes afterwards, and to report any adverse reaction to me immediately.       Screening performed by Justina Murillo on 1/30/2019 at 11:38 AM.

## 2019-01-30 NOTE — PROGRESS NOTES
"CC: Here for routine prenatal visit @ 29w3d   HPI:  Has not done GTT as yet; plans to next week;  Discussed updated CDC guidelines on TDAP vaccine in pregnancy between 27 and 36 weeks EGA .  Given today    PE: /79 (BP Location: Right arm, Patient Position: Chair, Cuff Size: Adult Small)   Pulse 129   Temp 97.8  F (36.6  C) (Tympanic)   Resp 18   Ht 1.575 m (5' 2\")   Wt 84.8 kg (187 lb)   LMP 06/05/2018   BMI 34.20 kg/m     See OB flowsheet      A:  1. Need for Tdap vaccination    - TDAP, IM (10 - 64 YRS) - Adacel  - ADMIN 1st VACCINE    2. Encounter for prenatal care in third trimester of first pregnancy        Routine prenatal care  RTC 2 weeks.      Carla Cerda M.D.     "

## 2019-02-13 ENCOUNTER — PRENATAL OFFICE VISIT (OUTPATIENT)
Dept: OBGYN | Facility: CLINIC | Age: 29
End: 2019-02-13
Payer: COMMERCIAL

## 2019-02-13 VITALS
HEIGHT: 62 IN | SYSTOLIC BLOOD PRESSURE: 114 MMHG | BODY MASS INDEX: 34.6 KG/M2 | RESPIRATION RATE: 18 BRPM | TEMPERATURE: 97.8 F | HEART RATE: 128 BPM | WEIGHT: 188 LBS | DIASTOLIC BLOOD PRESSURE: 79 MMHG

## 2019-02-13 DIAGNOSIS — Z34.03 ENCOUNTER FOR PRENATAL CARE IN THIRD TRIMESTER OF FIRST PREGNANCY: Primary | ICD-10-CM

## 2019-02-13 DIAGNOSIS — M54.5 LOW BACK PAIN, UNSPECIFIED BACK PAIN LATERALITY, UNSPECIFIED CHRONICITY, WITH SCIATICA PRESENCE UNSPECIFIED: ICD-10-CM

## 2019-02-13 PROCEDURE — 99207 ZZC PRENATAL VISIT: CPT | Performed by: OBSTETRICS & GYNECOLOGY

## 2019-02-13 RX ORDER — HYDROXYZINE PAMOATE 50 MG/1
50-100 CAPSULE ORAL AT BEDTIME
Qty: 40 CAPSULE | Refills: 0 | Status: ON HOLD | OUTPATIENT
Start: 2019-02-13 | End: 2019-04-03

## 2019-02-13 ASSESSMENT — MIFFLIN-ST. JEOR: SCORE: 1546.01

## 2019-02-13 NOTE — PROGRESS NOTES
"CC: Here for routine prenatal visit @ 31w3d   HPI:  C/o difficulty sleeping due to back pain; denies contractions    PE: /79 (BP Location: Right arm, Patient Position: Chair, Cuff Size: Adult Large)   Pulse 128   Temp 97.8  F (36.6  C) (Tympanic)   Resp 18   Ht 1.575 m (5' 2\")   Wt 85.3 kg (188 lb)   LMP 06/05/2018   BMI 34.39 kg/m     See OB flowsheet      A:  1. Encounter for prenatal care in third trimester of first pregnancy      2. Low back pain, unspecified back pain laterality, unspecified chronicity, with sciatica presence unspecified    - hydrOXYzine (VISTARIL) 50 MG capsule; Take 1-2 capsules ( mg) by mouth At Bedtime  Dispense: 40 capsule; Refill: 0      Routine prenatal care  RTC 2 weeks.      Carla Cerda M.D.     "

## 2019-03-11 ENCOUNTER — PRENATAL OFFICE VISIT (OUTPATIENT)
Dept: OBGYN | Facility: CLINIC | Age: 29
End: 2019-03-11
Payer: COMMERCIAL

## 2019-03-11 VITALS
TEMPERATURE: 98.5 F | DIASTOLIC BLOOD PRESSURE: 76 MMHG | BODY MASS INDEX: 35.41 KG/M2 | HEART RATE: 116 BPM | WEIGHT: 193.6 LBS | SYSTOLIC BLOOD PRESSURE: 120 MMHG

## 2019-03-11 DIAGNOSIS — Z34.03 ENCOUNTER FOR PRENATAL CARE IN THIRD TRIMESTER OF FIRST PREGNANCY: Primary | ICD-10-CM

## 2019-03-11 PROCEDURE — 99207 ZZC PRENATAL VISIT: CPT | Performed by: OBSTETRICS & GYNECOLOGY

## 2019-03-11 NOTE — PROGRESS NOTES
CC: Here for routine prenatal visit @ 35w1d   HPI:  Having some cramping; reviewed s/s of labor, when to ivonne BC; importance of fetal movement assessment, pain management in labor, course of remainder of pregnancy    PE: /76 (BP Location: Right arm, Patient Position: Sitting, Cuff Size: Adult Large)   Pulse 116   Temp 98.5  F (36.9  C) (Oral)   Wt 87.8 kg (193 lb 9.6 oz)   LMP 06/05/2018   BMI 35.41 kg/m     See OB flowsheet      A:  1. Encounter for prenatal care in third trimester of first pregnancy        Routine prenatal care  RTC 1 weeks.      Carla Cerda M.D.

## 2019-03-11 NOTE — NURSING NOTE
"Initial /76 (BP Location: Right arm, Patient Position: Sitting, Cuff Size: Adult Large)   Pulse 116   Temp 98.5  F (36.9  C) (Oral)   Wt 87.8 kg (193 lb 9.6 oz)   LMP 06/05/2018   BMI 35.41 kg/m   Estimated body mass index is 35.41 kg/m  as calculated from the following:    Height as of 2/13/19: 1.575 m (5' 2\").    Weight as of this encounter: 87.8 kg (193 lb 9.6 oz). .    Caleb RICHARDS CMA    "

## 2019-03-22 ENCOUNTER — PRENATAL OFFICE VISIT (OUTPATIENT)
Dept: OBGYN | Facility: CLINIC | Age: 29
End: 2019-03-22
Payer: COMMERCIAL

## 2019-03-22 VITALS
RESPIRATION RATE: 18 BRPM | TEMPERATURE: 98.7 F | HEART RATE: 108 BPM | BODY MASS INDEX: 36.25 KG/M2 | HEIGHT: 62 IN | DIASTOLIC BLOOD PRESSURE: 68 MMHG | SYSTOLIC BLOOD PRESSURE: 123 MMHG | WEIGHT: 197 LBS

## 2019-03-22 DIAGNOSIS — Z34.03 ENCOUNTER FOR PRENATAL CARE IN THIRD TRIMESTER OF FIRST PREGNANCY: Primary | ICD-10-CM

## 2019-03-22 PROCEDURE — 87186 SC STD MICRODIL/AGAR DIL: CPT | Performed by: OBSTETRICS & GYNECOLOGY

## 2019-03-22 PROCEDURE — 99207 ZZC PRENATAL VISIT: CPT | Performed by: OBSTETRICS & GYNECOLOGY

## 2019-03-22 PROCEDURE — 87653 STREP B DNA AMP PROBE: CPT | Performed by: OBSTETRICS & GYNECOLOGY

## 2019-03-22 ASSESSMENT — MIFFLIN-ST. JEOR: SCORE: 1586.84

## 2019-03-22 NOTE — PROGRESS NOTES
"CC: Here for routine prenatal visit @ 36w5d   HPI:  Having a lot of cramping; no LOF or bleeding; reviewed s/s of labor; GBS taken    PE: /68 (BP Location: Right arm, Patient Position: Chair, Cuff Size: Adult Large)   Pulse 108   Temp 98.7  F (37.1  C) (Tympanic)   Resp 18   Ht 1.575 m (5' 2\")   Wt 89.4 kg (197 lb)   LMP 06/05/2018   BMI 36.03 kg/m     See OB flowsheet      A:  1. Encounter for prenatal care in third trimester of first pregnancy    - Strep, Group B by PCR      Routine prenatal care  RTC 1 weeks.      Carla Cerda M.D.     "

## 2019-03-23 LAB
GP B STREP DNA SPEC QL NAA+PROBE: POSITIVE
SPECIMEN SOURCE: ABNORMAL

## 2019-03-25 PROBLEM — O99.820 GBS (GROUP B STREPTOCOCCUS CARRIER), +RV CULTURE, CURRENTLY PREGNANT: Status: ACTIVE | Noted: 2019-03-25

## 2019-03-26 LAB
BACTERIA SPEC CULT: ABNORMAL
SPECIMEN SOURCE: ABNORMAL

## 2019-03-29 ENCOUNTER — HOSPITAL ENCOUNTER (OUTPATIENT)
Facility: CLINIC | Age: 29
Discharge: HOME | End: 2019-03-30
Attending: OBSTETRICS & GYNECOLOGY | Admitting: OBSTETRICS & GYNECOLOGY
Payer: COMMERCIAL

## 2019-03-29 ENCOUNTER — PRENATAL OFFICE VISIT (OUTPATIENT)
Dept: OBGYN | Facility: CLINIC | Age: 29
End: 2019-03-29
Payer: COMMERCIAL

## 2019-03-29 VITALS
RESPIRATION RATE: 18 BRPM | HEIGHT: 62 IN | HEART RATE: 121 BPM | DIASTOLIC BLOOD PRESSURE: 86 MMHG | WEIGHT: 199 LBS | SYSTOLIC BLOOD PRESSURE: 132 MMHG | TEMPERATURE: 98.2 F | BODY MASS INDEX: 36.62 KG/M2

## 2019-03-29 DIAGNOSIS — Z34.03 ENCOUNTER FOR PRENATAL CARE IN THIRD TRIMESTER OF FIRST PREGNANCY: Primary | ICD-10-CM

## 2019-03-29 DIAGNOSIS — O13.3 GESTATIONAL HYPERTENSION, THIRD TRIMESTER: ICD-10-CM

## 2019-03-29 PROCEDURE — 59025 FETAL NON-STRESS TEST: CPT | Mod: 26 | Performed by: OBSTETRICS & GYNECOLOGY

## 2019-03-29 PROCEDURE — 99207 ZZC PRENATAL VISIT: CPT | Performed by: OBSTETRICS & GYNECOLOGY

## 2019-03-29 PROCEDURE — 59425 ANTEPARTUM CARE ONLY: CPT | Performed by: OBSTETRICS & GYNECOLOGY

## 2019-03-29 RX ORDER — HYDRALAZINE HYDROCHLORIDE 20 MG/ML
5 INJECTION INTRAMUSCULAR; INTRAVENOUS
Status: CANCELLED | OUTPATIENT
Start: 2019-03-29

## 2019-03-29 RX ORDER — CARBOPROST TROMETHAMINE 250 UG/ML
250 INJECTION, SOLUTION INTRAMUSCULAR
Status: CANCELLED | OUTPATIENT
Start: 2019-03-29

## 2019-03-29 RX ORDER — FENTANYL CITRATE 50 UG/ML
50-100 INJECTION, SOLUTION INTRAMUSCULAR; INTRAVENOUS
Status: CANCELLED | OUTPATIENT
Start: 2019-03-29

## 2019-03-29 RX ORDER — MAGNESIUM SULFATE HEPTAHYDRATE 40 MG/ML
2 INJECTION, SOLUTION INTRAVENOUS
Status: CANCELLED | OUTPATIENT
Start: 2019-03-29

## 2019-03-29 RX ORDER — MAGNESIUM SULFATE HEPTAHYDRATE 500 MG/ML
4 INJECTION, SOLUTION INTRAMUSCULAR; INTRAVENOUS
Status: CANCELLED | OUTPATIENT
Start: 2019-03-29

## 2019-03-29 RX ORDER — SODIUM CHLORIDE, SODIUM LACTATE, POTASSIUM CHLORIDE, CALCIUM CHLORIDE 600; 310; 30; 20 MG/100ML; MG/100ML; MG/100ML; MG/100ML
INJECTION, SOLUTION INTRAVENOUS CONTINUOUS
Status: CANCELLED | OUTPATIENT
Start: 2019-03-29

## 2019-03-29 RX ORDER — NALOXONE HYDROCHLORIDE 0.4 MG/ML
.1-.4 INJECTION, SOLUTION INTRAMUSCULAR; INTRAVENOUS; SUBCUTANEOUS
Status: CANCELLED | OUTPATIENT
Start: 2019-03-29

## 2019-03-29 RX ORDER — METHYLERGONOVINE MALEATE 0.2 MG/ML
200 INJECTION INTRAVENOUS
Status: CANCELLED | OUTPATIENT
Start: 2019-03-29

## 2019-03-29 RX ORDER — ONDANSETRON 2 MG/ML
4 INJECTION INTRAMUSCULAR; INTRAVENOUS EVERY 6 HOURS PRN
Status: CANCELLED | OUTPATIENT
Start: 2019-03-29

## 2019-03-29 RX ORDER — PENICILLIN G POTASSIUM 5000000 [IU]/1
5 INJECTION, POWDER, FOR SOLUTION INTRAMUSCULAR; INTRAVENOUS ONCE
Status: CANCELLED | OUTPATIENT
Start: 2019-03-29 | End: 2019-03-29

## 2019-03-29 RX ORDER — LORAZEPAM 2 MG/ML
2 INJECTION INTRAMUSCULAR
Status: CANCELLED | OUTPATIENT
Start: 2019-03-29

## 2019-03-29 RX ORDER — NIFEDIPINE 10 MG/1
10 CAPSULE ORAL
Status: CANCELLED | OUTPATIENT
Start: 2019-03-29

## 2019-03-29 RX ORDER — ACETAMINOPHEN 325 MG/1
650 TABLET ORAL EVERY 4 HOURS PRN
Status: CANCELLED | OUTPATIENT
Start: 2019-03-29

## 2019-03-29 RX ORDER — MAGNESIUM SULFATE IN WATER 40 MG/ML
2 INJECTION, SOLUTION INTRAVENOUS CONTINUOUS
Status: CANCELLED | OUTPATIENT
Start: 2019-03-29

## 2019-03-29 RX ORDER — HYDRALAZINE HYDROCHLORIDE 20 MG/ML
10 INJECTION INTRAMUSCULAR; INTRAVENOUS
Status: CANCELLED | OUTPATIENT
Start: 2019-03-29

## 2019-03-29 RX ORDER — IBUPROFEN 400 MG/1
800 TABLET, FILM COATED ORAL
Status: CANCELLED | OUTPATIENT
Start: 2019-03-29

## 2019-03-29 RX ORDER — MAGNESIUM SULFATE HEPTAHYDRATE 40 MG/ML
4 INJECTION, SOLUTION INTRAVENOUS
Status: CANCELLED | OUTPATIENT
Start: 2019-03-29

## 2019-03-29 RX ORDER — MAGNESIUM SULFATE HEPTAHYDRATE 40 MG/ML
4 INJECTION, SOLUTION INTRAVENOUS ONCE
Status: CANCELLED | OUTPATIENT
Start: 2019-03-29 | End: 2019-03-29

## 2019-03-29 RX ORDER — OXYTOCIN/0.9 % SODIUM CHLORIDE 30/500 ML
100-340 PLASTIC BAG, INJECTION (ML) INTRAVENOUS CONTINUOUS PRN
Status: CANCELLED | OUTPATIENT
Start: 2019-03-29

## 2019-03-29 RX ORDER — CALCIUM GLUCONATE 94 MG/ML
1 INJECTION, SOLUTION INTRAVENOUS
Status: CANCELLED | OUTPATIENT
Start: 2019-03-29

## 2019-03-29 RX ORDER — OXYTOCIN/0.9 % SODIUM CHLORIDE 30/500 ML
1-24 PLASTIC BAG, INJECTION (ML) INTRAVENOUS CONTINUOUS
Status: CANCELLED | OUTPATIENT
Start: 2019-03-29

## 2019-03-29 RX ORDER — SODIUM CHLORIDE, SODIUM LACTATE, POTASSIUM CHLORIDE, CALCIUM CHLORIDE 600; 310; 30; 20 MG/100ML; MG/100ML; MG/100ML; MG/100ML
10-125 INJECTION, SOLUTION INTRAVENOUS CONTINUOUS
Status: CANCELLED | OUTPATIENT
Start: 2019-03-29

## 2019-03-29 RX ORDER — OXYCODONE AND ACETAMINOPHEN 5; 325 MG/1; MG/1
1 TABLET ORAL
Status: CANCELLED | OUTPATIENT
Start: 2019-03-29

## 2019-03-29 RX ORDER — LIDOCAINE 40 MG/G
CREAM TOPICAL
Status: CANCELLED | OUTPATIENT
Start: 2019-03-29

## 2019-03-29 RX ORDER — OXYTOCIN 10 [USP'U]/ML
10 INJECTION, SOLUTION INTRAMUSCULAR; INTRAVENOUS
Status: CANCELLED | OUTPATIENT
Start: 2019-03-29

## 2019-03-29 ASSESSMENT — MIFFLIN-ST. JEOR: SCORE: 1595.91

## 2019-03-29 NOTE — PROGRESS NOTES
"CC: Here for routine prenatal visit @ 37w5d   HPI:  Denies headache or scotomata; has noticed distinct increase in swelling; discussed IOL; pt requests 19    PE: /86 (BP Location: Right arm, Patient Position: Chair, Cuff Size: Adult Large)   Pulse 121   Temp 98.2  F (36.8  C) (Tympanic)   Resp 18   Ht 1.575 m (5' 2\")   Wt 90.3 kg (199 lb)   LMP 2018   BMI 36.40 kg/m     See OB flowsheet      A:  1. Encounter for prenatal care in third trimester of first pregnancy      2. Gestational hypertension, third trimester        Discussed with Christianne Ferrell, the following; indications; the agents and methods of labor augmentation, including risks, benefits, and alternative approaches; and the possible need for  birth. EFW is AGA.    The Labor Induction:what you need to know information sheet was made available to her. Questions and concerns were addressed and patient agrees to above if necessary during the course of her labor.    MD Carla Diamond M.D.     "

## 2019-03-30 VITALS — DIASTOLIC BLOOD PRESSURE: 71 MMHG | RESPIRATION RATE: 18 BRPM | SYSTOLIC BLOOD PRESSURE: 125 MMHG | TEMPERATURE: 98.3 F

## 2019-03-30 PROBLEM — Z34.90 PREGNANCY: Status: ACTIVE | Noted: 2019-03-30

## 2019-03-30 PROCEDURE — G0463 HOSPITAL OUTPT CLINIC VISIT: HCPCS | Mod: 25

## 2019-03-30 PROCEDURE — 25000132 ZZH RX MED GY IP 250 OP 250 PS 637: Performed by: OBSTETRICS & GYNECOLOGY

## 2019-03-30 PROCEDURE — 59025 FETAL NON-STRESS TEST: CPT

## 2019-03-30 RX ORDER — SODIUM CHLORIDE, SODIUM LACTATE, POTASSIUM CHLORIDE, CALCIUM CHLORIDE 600; 310; 30; 20 MG/100ML; MG/100ML; MG/100ML; MG/100ML
INJECTION, SOLUTION INTRAVENOUS CONTINUOUS
Status: DISCONTINUED | OUTPATIENT
Start: 2019-03-30 | End: 2019-03-30 | Stop reason: HOSPADM

## 2019-03-30 RX ORDER — HYDROXYZINE HYDROCHLORIDE 50 MG/1
100 TABLET, FILM COATED ORAL EVERY 6 HOURS PRN
Status: DISCONTINUED | OUTPATIENT
Start: 2019-03-30 | End: 2019-03-30 | Stop reason: HOSPADM

## 2019-03-30 RX ORDER — ONDANSETRON 2 MG/ML
4 INJECTION INTRAMUSCULAR; INTRAVENOUS EVERY 6 HOURS PRN
Status: DISCONTINUED | OUTPATIENT
Start: 2019-03-30 | End: 2019-03-30 | Stop reason: HOSPADM

## 2019-03-30 RX ORDER — HYDROXYZINE HYDROCHLORIDE 50 MG/1
50 TABLET, FILM COATED ORAL EVERY 6 HOURS PRN
Status: DISCONTINUED | OUTPATIENT
Start: 2019-03-30 | End: 2019-03-30

## 2019-03-30 RX ADMIN — HYDROXYZINE HYDROCHLORIDE 100 MG: 50 TABLET, FILM COATED ORAL at 00:36

## 2019-03-30 NOTE — DISCHARGE INSTRUCTIONS
Discharge Instructions for Undelivered Patients    Diet:    Drink 8 to 12 glasses of liquids (milk, juice, water) every day.    You may eat meals and snacks..    Activity:      Count fetal kicks every day.     Call your doctor if your baby is moving less than usual.    Medicines:    My care team has reviewed my medicines with me.    My care team has given me a list of my medicines.    My care team has prescribed a new medicine. They have either sent it home with me or ordered it from the pharmacy.    Call your provider if you notice:    Swelling in your face or increased swelling in your hands or legs.    Headaches that are not relieved by Tylenol (acetaminophen).    Changes in your vision (blurring; seeing spots or stars).    Nausea (sick to your stomach) and vomiting (throwing up).    Weight gain of 5 pounds per week.    Heartburn that doesn't go away.    Signs of bladder infection: pain when you urinate (use the toilet), needing to go more often or more urgently.    The bag of gutierrez (membranes) breaks, or you notice leaking in your underwear.    Bright red blood in your underwear.    Abdominal (lower belly) or stomach pain.    For first baby: Contractions (tightenings) less than 5 minutes apart for one hour or more.    Increase or change in vaginal discharge (note the color and amount).      Follow up with your provider:  Monday April 1st for induction of labor

## 2019-03-30 NOTE — PROGRESS NOTES
Updated Dr. Bradley on Pt status. Cat 1 tracing. NST verified by Lauren LINDO. One hour after the hydroxyzine was given and 2 large glasses of water the contractions spaced. Discharge orders received and discussed with pt. Questions answered. Pt is scheduled for a IOL Monday April 1st. Pt left with her .

## 2019-04-01 ENCOUNTER — ANESTHESIA (OUTPATIENT)
Dept: OBGYN | Facility: CLINIC | Age: 29
End: 2019-04-01
Payer: COMMERCIAL

## 2019-04-01 ENCOUNTER — HOSPITAL ENCOUNTER (INPATIENT)
Facility: CLINIC | Age: 29
LOS: 2 days | Discharge: HOME | End: 2019-04-03
Attending: OBSTETRICS & GYNECOLOGY | Admitting: OBSTETRICS & GYNECOLOGY
Payer: COMMERCIAL

## 2019-04-01 ENCOUNTER — ANESTHESIA EVENT (OUTPATIENT)
Dept: OBGYN | Facility: CLINIC | Age: 29
End: 2019-04-01
Payer: COMMERCIAL

## 2019-04-01 PROBLEM — O13.9 GESTATIONAL HYPERTENSION: Status: ACTIVE | Noted: 2019-04-01

## 2019-04-01 LAB
ABO + RH BLD: NORMAL
ABO + RH BLD: NORMAL
ALT SERPL W P-5'-P-CCNC: 26 U/L (ref 0–50)
AMPHETAMINES UR QL SCN: NEGATIVE
AST SERPL W P-5'-P-CCNC: 17 U/L (ref 0–45)
BARBITURATES UR QL: NEGATIVE
BENZODIAZ UR QL: NEGATIVE
BLD GP AB SCN SERPL QL: NORMAL
BLOOD BANK CMNT PATIENT-IMP: NORMAL
CANNABINOIDS UR QL SCN: NEGATIVE
COCAINE UR QL: NEGATIVE
CREAT SERPL-MCNC: 0.66 MG/DL (ref 0.52–1.04)
ERYTHROCYTE [DISTWIDTH] IN BLOOD BY AUTOMATED COUNT: 14 % (ref 10–15)
GFR SERPL CREATININE-BSD FRML MDRD: >90 ML/MIN/{1.73_M2}
HCT VFR BLD AUTO: 36.7 % (ref 35–47)
HGB BLD-MCNC: 12.2 G/DL (ref 11.7–15.7)
MCH RBC QN AUTO: 30.2 PG (ref 26.5–33)
MCHC RBC AUTO-ENTMCNC: 33.2 G/DL (ref 31.5–36.5)
MCV RBC AUTO: 91 FL (ref 78–100)
OPIATES UR QL SCN: NEGATIVE
PCP UR QL SCN: NEGATIVE
PLATELET # BLD AUTO: 326 10E9/L (ref 150–450)
PROT UR-MCNC: 0.2 G/L
PROT/CREAT 24H UR: 0.15 G/G CR (ref 0–0.2)
RBC # BLD AUTO: 4.04 10E12/L (ref 3.8–5.2)
SPECIMEN EXP DATE BLD: NORMAL
T PALLIDUM AB SER QL: NONREACTIVE
WBC # BLD AUTO: 14.9 10E9/L (ref 4–11)

## 2019-04-01 PROCEDURE — 86850 RBC ANTIBODY SCREEN: CPT | Performed by: OBSTETRICS & GYNECOLOGY

## 2019-04-01 PROCEDURE — 25000125 ZZHC RX 250: Performed by: OBSTETRICS & GYNECOLOGY

## 2019-04-01 PROCEDURE — 84450 TRANSFERASE (AST) (SGOT): CPT | Performed by: OBSTETRICS & GYNECOLOGY

## 2019-04-01 PROCEDURE — 12000000 ZZH R&B MED SURG/OB

## 2019-04-01 PROCEDURE — 25000125 ZZHC RX 250

## 2019-04-01 PROCEDURE — 86900 BLOOD TYPING SEROLOGIC ABO: CPT | Performed by: OBSTETRICS & GYNECOLOGY

## 2019-04-01 PROCEDURE — 25800030 ZZH RX IP 258 OP 636: Performed by: OBSTETRICS & GYNECOLOGY

## 2019-04-01 PROCEDURE — 25800030 ZZH RX IP 258 OP 636: Performed by: NURSE ANESTHETIST, CERTIFIED REGISTERED

## 2019-04-01 PROCEDURE — 25000128 H RX IP 250 OP 636: Performed by: OBSTETRICS & GYNECOLOGY

## 2019-04-01 PROCEDURE — 37000011 ZZH ANESTHESIA WARD SERVICE

## 2019-04-01 PROCEDURE — 0064U ANTB TP TOTAL&RPR IA QUAL: CPT | Performed by: OBSTETRICS & GYNECOLOGY

## 2019-04-01 PROCEDURE — 86901 BLOOD TYPING SEROLOGIC RH(D): CPT | Performed by: OBSTETRICS & GYNECOLOGY

## 2019-04-01 PROCEDURE — 25000125 ZZHC RX 250: Performed by: NURSE ANESTHETIST, CERTIFIED REGISTERED

## 2019-04-01 PROCEDURE — 3E0R3BZ INTRODUCTION OF ANESTHETIC AGENT INTO SPINAL CANAL, PERCUTANEOUS APPROACH: ICD-10-PCS | Performed by: NURSE ANESTHETIST, CERTIFIED REGISTERED

## 2019-04-01 PROCEDURE — 00HU33Z INSERTION OF INFUSION DEVICE INTO SPINAL CANAL, PERCUTANEOUS APPROACH: ICD-10-PCS | Performed by: OBSTETRICS & GYNECOLOGY

## 2019-04-01 PROCEDURE — 40000671 ZZH STATISTIC ANESTHESIA CASE

## 2019-04-01 PROCEDURE — 27110038 ZZH RX 271: Performed by: NURSE ANESTHETIST, CERTIFIED REGISTERED

## 2019-04-01 PROCEDURE — 80307 DRUG TEST PRSMV CHEM ANLYZR: CPT | Performed by: OBSTETRICS & GYNECOLOGY

## 2019-04-01 PROCEDURE — 84156 ASSAY OF PROTEIN URINE: CPT | Performed by: OBSTETRICS & GYNECOLOGY

## 2019-04-01 PROCEDURE — 85027 COMPLETE CBC AUTOMATED: CPT | Performed by: OBSTETRICS & GYNECOLOGY

## 2019-04-01 PROCEDURE — 82565 ASSAY OF CREATININE: CPT | Performed by: OBSTETRICS & GYNECOLOGY

## 2019-04-01 PROCEDURE — 25000128 H RX IP 250 OP 636: Performed by: NURSE ANESTHETIST, CERTIFIED REGISTERED

## 2019-04-01 PROCEDURE — 84460 ALANINE AMINO (ALT) (SGPT): CPT | Performed by: OBSTETRICS & GYNECOLOGY

## 2019-04-01 PROCEDURE — 36415 COLL VENOUS BLD VENIPUNCTURE: CPT | Performed by: OBSTETRICS & GYNECOLOGY

## 2019-04-01 RX ORDER — HYDRALAZINE HYDROCHLORIDE 20 MG/ML
10 INJECTION INTRAMUSCULAR; INTRAVENOUS
Status: DISCONTINUED | OUTPATIENT
Start: 2019-04-01 | End: 2019-04-03

## 2019-04-01 RX ORDER — NALOXONE HYDROCHLORIDE 0.4 MG/ML
.1-.4 INJECTION, SOLUTION INTRAMUSCULAR; INTRAVENOUS; SUBCUTANEOUS
Status: DISCONTINUED | OUTPATIENT
Start: 2019-04-01 | End: 2019-04-04 | Stop reason: HOSPADM

## 2019-04-01 RX ORDER — CARBOPROST TROMETHAMINE 250 UG/ML
250 INJECTION, SOLUTION INTRAMUSCULAR
Status: DISCONTINUED | OUTPATIENT
Start: 2019-04-01 | End: 2019-04-04 | Stop reason: HOSPADM

## 2019-04-01 RX ORDER — SODIUM CHLORIDE, SODIUM LACTATE, POTASSIUM CHLORIDE, CALCIUM CHLORIDE 600; 310; 30; 20 MG/100ML; MG/100ML; MG/100ML; MG/100ML
10-125 INJECTION, SOLUTION INTRAVENOUS CONTINUOUS
Status: DISCONTINUED | OUTPATIENT
Start: 2019-04-01 | End: 2019-04-03

## 2019-04-01 RX ORDER — EPHEDRINE SULFATE 50 MG/ML
5 INJECTION, SOLUTION INTRAMUSCULAR; INTRAVENOUS; SUBCUTANEOUS
Status: DISCONTINUED | OUTPATIENT
Start: 2019-04-01 | End: 2019-04-03

## 2019-04-01 RX ORDER — PENICILLIN G POTASSIUM 5000000 [IU]/1
5 INJECTION, POWDER, FOR SOLUTION INTRAMUSCULAR; INTRAVENOUS ONCE
Status: COMPLETED | OUTPATIENT
Start: 2019-04-01 | End: 2019-04-01

## 2019-04-01 RX ORDER — LORAZEPAM 2 MG/ML
2 INJECTION INTRAMUSCULAR
Status: DISCONTINUED | OUTPATIENT
Start: 2019-04-01 | End: 2019-04-04 | Stop reason: HOSPADM

## 2019-04-01 RX ORDER — MAGNESIUM SULFATE HEPTAHYDRATE 40 MG/ML
4 INJECTION, SOLUTION INTRAVENOUS ONCE
Status: DISCONTINUED | OUTPATIENT
Start: 2019-04-01 | End: 2019-04-03

## 2019-04-01 RX ORDER — SODIUM CHLORIDE, SODIUM LACTATE, POTASSIUM CHLORIDE, CALCIUM CHLORIDE 600; 310; 30; 20 MG/100ML; MG/100ML; MG/100ML; MG/100ML
INJECTION, SOLUTION INTRAVENOUS CONTINUOUS
Status: DISCONTINUED | OUTPATIENT
Start: 2019-04-01 | End: 2019-04-03

## 2019-04-01 RX ORDER — MAGNESIUM SULFATE HEPTAHYDRATE 40 MG/ML
4 INJECTION, SOLUTION INTRAVENOUS
Status: DISCONTINUED | OUTPATIENT
Start: 2019-04-01 | End: 2019-04-03

## 2019-04-01 RX ORDER — LIDOCAINE HYDROCHLORIDE 10 MG/ML
INJECTION, SOLUTION INFILTRATION; PERINEURAL PRN
Status: DISCONTINUED | OUTPATIENT
Start: 2019-04-01 | End: 2019-04-02

## 2019-04-01 RX ORDER — OXYTOCIN/0.9 % SODIUM CHLORIDE 30/500 ML
100-340 PLASTIC BAG, INJECTION (ML) INTRAVENOUS CONTINUOUS PRN
Status: COMPLETED | OUTPATIENT
Start: 2019-04-01 | End: 2019-04-02

## 2019-04-01 RX ORDER — CALCIUM GLUCONATE 94 MG/ML
1 INJECTION, SOLUTION INTRAVENOUS
Status: DISCONTINUED | OUTPATIENT
Start: 2019-04-01 | End: 2019-04-03

## 2019-04-01 RX ORDER — ACETAMINOPHEN 325 MG/1
650 TABLET ORAL EVERY 4 HOURS PRN
Status: DISCONTINUED | OUTPATIENT
Start: 2019-04-01 | End: 2019-04-03

## 2019-04-01 RX ORDER — ONDANSETRON 2 MG/ML
4 INJECTION INTRAMUSCULAR; INTRAVENOUS EVERY 6 HOURS PRN
Status: DISCONTINUED | OUTPATIENT
Start: 2019-04-01 | End: 2019-04-04 | Stop reason: HOSPADM

## 2019-04-01 RX ORDER — EPHEDRINE SULFATE 50 MG/ML
INJECTION, SOLUTION INTRAMUSCULAR; INTRAVENOUS; SUBCUTANEOUS
Status: DISCONTINUED
Start: 2019-04-01 | End: 2019-04-02 | Stop reason: WASHOUT

## 2019-04-01 RX ORDER — NALBUPHINE HYDROCHLORIDE 10 MG/ML
2.5-5 INJECTION, SOLUTION INTRAMUSCULAR; INTRAVENOUS; SUBCUTANEOUS EVERY 6 HOURS PRN
Status: DISCONTINUED | OUTPATIENT
Start: 2019-04-01 | End: 2019-04-04 | Stop reason: HOSPADM

## 2019-04-01 RX ORDER — HYDRALAZINE HYDROCHLORIDE 20 MG/ML
5 INJECTION INTRAMUSCULAR; INTRAVENOUS
Status: DISCONTINUED | OUTPATIENT
Start: 2019-04-01 | End: 2019-04-03

## 2019-04-01 RX ORDER — OXYCODONE AND ACETAMINOPHEN 5; 325 MG/1; MG/1
1 TABLET ORAL
Status: DISCONTINUED | OUTPATIENT
Start: 2019-04-01 | End: 2019-04-04 | Stop reason: HOSPADM

## 2019-04-01 RX ORDER — LIDOCAINE HYDROCHLORIDE AND EPINEPHRINE 15; 5 MG/ML; UG/ML
INJECTION, SOLUTION EPIDURAL PRN
Status: DISCONTINUED | OUTPATIENT
Start: 2019-04-01 | End: 2019-04-02

## 2019-04-01 RX ORDER — OXYTOCIN/0.9 % SODIUM CHLORIDE 30/500 ML
1-24 PLASTIC BAG, INJECTION (ML) INTRAVENOUS CONTINUOUS
Status: DISCONTINUED | OUTPATIENT
Start: 2019-04-01 | End: 2019-04-03

## 2019-04-01 RX ORDER — MAGNESIUM SULFATE HEPTAHYDRATE 40 MG/ML
2 INJECTION, SOLUTION INTRAVENOUS
Status: DISCONTINUED | OUTPATIENT
Start: 2019-04-01 | End: 2019-04-03

## 2019-04-01 RX ORDER — FENTANYL CITRATE 50 UG/ML
50-100 INJECTION, SOLUTION INTRAMUSCULAR; INTRAVENOUS
Status: DISCONTINUED | OUTPATIENT
Start: 2019-04-01 | End: 2019-04-03

## 2019-04-01 RX ORDER — METHYLERGONOVINE MALEATE 0.2 MG/ML
200 INJECTION INTRAVENOUS
Status: DISCONTINUED | OUTPATIENT
Start: 2019-04-01 | End: 2019-04-04 | Stop reason: HOSPADM

## 2019-04-01 RX ORDER — MAGNESIUM SULFATE HEPTAHYDRATE 500 MG/ML
4 INJECTION, SOLUTION INTRAMUSCULAR; INTRAVENOUS
Status: DISCONTINUED | OUTPATIENT
Start: 2019-04-01 | End: 2019-04-03

## 2019-04-01 RX ORDER — LIDOCAINE 40 MG/G
CREAM TOPICAL
Status: DISCONTINUED | OUTPATIENT
Start: 2019-04-01 | End: 2019-04-04 | Stop reason: HOSPADM

## 2019-04-01 RX ORDER — MAGNESIUM SULFATE IN WATER 40 MG/ML
2 INJECTION, SOLUTION INTRAVENOUS CONTINUOUS
Status: DISCONTINUED | OUTPATIENT
Start: 2019-04-01 | End: 2019-04-03

## 2019-04-01 RX ORDER — NIFEDIPINE 10 MG/1
10 CAPSULE ORAL
Status: DISCONTINUED | OUTPATIENT
Start: 2019-04-01 | End: 2019-04-04 | Stop reason: HOSPADM

## 2019-04-01 RX ORDER — OXYTOCIN 10 [USP'U]/ML
10 INJECTION, SOLUTION INTRAMUSCULAR; INTRAVENOUS
Status: DISCONTINUED | OUTPATIENT
Start: 2019-04-01 | End: 2019-04-03

## 2019-04-01 RX ORDER — IBUPROFEN 800 MG/1
800 TABLET, FILM COATED ORAL
Status: DISCONTINUED | OUTPATIENT
Start: 2019-04-01 | End: 2019-04-04 | Stop reason: HOSPADM

## 2019-04-01 RX ADMIN — SODIUM CHLORIDE 2.5 MILLION UNITS: 9 INJECTION, SOLUTION INTRAVENOUS at 20:39

## 2019-04-01 RX ADMIN — OXYTOCIN-SODIUM CHLORIDE 0.9% IV SOLN 30 UNIT/500ML 2 MILLI-UNITS/MIN: 30-0.9/5 SOLUTION at 08:22

## 2019-04-01 RX ADMIN — SODIUM CHLORIDE 2.5 MILLION UNITS: 9 INJECTION, SOLUTION INTRAVENOUS at 12:11

## 2019-04-01 RX ADMIN — PENICILLIN G POTASSIUM 5 MILLION UNITS: 5000000 POWDER, FOR SOLUTION INTRAMUSCULAR; INTRAPLEURAL; INTRATHECAL; INTRAVENOUS at 08:24

## 2019-04-01 RX ADMIN — Medication 8 ML: at 15:20

## 2019-04-01 RX ADMIN — LIDOCAINE HYDROCHLORIDE AND EPINEPHRINE 30 MG: 15; 5 INJECTION, SOLUTION EPIDURAL at 15:17

## 2019-04-01 RX ADMIN — SODIUM CHLORIDE, POTASSIUM CHLORIDE, SODIUM LACTATE AND CALCIUM CHLORIDE 1000 ML: 600; 310; 30; 20 INJECTION, SOLUTION INTRAVENOUS at 15:03

## 2019-04-01 RX ADMIN — SODIUM CHLORIDE, POTASSIUM CHLORIDE, SODIUM LACTATE AND CALCIUM CHLORIDE: 600; 310; 30; 20 INJECTION, SOLUTION INTRAVENOUS at 21:45

## 2019-04-01 RX ADMIN — SODIUM CHLORIDE 2.5 MILLION UNITS: 9 INJECTION, SOLUTION INTRAVENOUS at 16:40

## 2019-04-01 RX ADMIN — LIDOCAINE HYDROCHLORIDE 30 MG: 10 INJECTION, SOLUTION INFILTRATION; PERINEURAL at 13:53

## 2019-04-01 RX ADMIN — SODIUM CHLORIDE, POTASSIUM CHLORIDE, SODIUM LACTATE AND CALCIUM CHLORIDE: 600; 310; 30; 20 INJECTION, SOLUTION INTRAVENOUS at 22:06

## 2019-04-01 RX ADMIN — SODIUM CHLORIDE, POTASSIUM CHLORIDE, SODIUM LACTATE AND CALCIUM CHLORIDE: 600; 310; 30; 20 INJECTION, SOLUTION INTRAVENOUS at 08:02

## 2019-04-01 RX ADMIN — LIDOCAINE HYDROCHLORIDE AND EPINEPHRINE 45 MG: 15; 5 INJECTION, SOLUTION EPIDURAL at 15:16

## 2019-04-01 RX ADMIN — Medication 10 ML/HR: at 15:22

## 2019-04-01 ASSESSMENT — ACTIVITIES OF DAILY LIVING (ADL)
TOILETING: 0-->INDEPENDENT
RETIRED_EATING: 0-->INDEPENDENT
SWALLOWING: 0-->SWALLOWS FOODS/LIQUIDS WITHOUT DIFFICULTY
FALL_HISTORY_WITHIN_LAST_SIX_MONTHS: YES
NUMBER_OF_TIMES_PATIENT_HAS_FALLEN_WITHIN_LAST_SIX_MONTHS: 2
AMBULATION: 0-->INDEPENDENT
BATHING: 0-->INDEPENDENT
RETIRED_COMMUNICATION: 0-->UNDERSTANDS/COMMUNICATES WITHOUT DIFFICULTY
TRANSFERRING: 0-->INDEPENDENT
COGNITION: 0 - NO COGNITION ISSUES REPORTED
DRESS: 0-->INDEPENDENT

## 2019-04-01 NOTE — ANESTHESIA PROCEDURE NOTES
Peripheral nerve/Neuraxial procedure note : epidural catheter  Pre-Procedure  Performed by  Juan Mojica APRN CRNA   Location: OB    Procedure Times:4/1/2019 1:51 PM and 4/1/2019 2:01 PM  Pre-Anesthestic Checklist: patient identified, IV checked, site marked, risks and benefits discussed, informed consent, monitors and equipment checked, pre-op evaluation, at physician/surgeon's request and post-op pain management    Timeout  Correct Patient: Yes   Correct Procedure: Yes   Correct Site: Yes   Correct Laterality: Yes   Correct Position: Yes   Site Marked: Yes   .   Procedure Documentation    Diagnosis:pain.    Procedure:    Epidural catheter.  Insertion Site:L4-5  (midline approach) Injection technique: LORT saline   Local skin infiltrated with 3 mL of 1% lidocaine.  RIC at 7 cm     Patient Prep;mask, sterile gloves, patient draped.  .  Needle: Touhy needle Needle Gauge: 17.    Needle Length (Inches) 3.5  # of attempts: 1 and # of redirects:  .   Catheter: 20 G . .  Catheter threaded easily  .  15 cm at skin.   .    Assessment/Narrative  Paresthesias: No.  .  .  Aspiration negative for heme or CSF  . Test dose of 5 mL lidocaine 1.5% w/ 1:200,000 epinephrine at 15:16.  Test dose negative for signs of intravascular, subdural or intrathecal injection. Comments:  VAS pain score prior to epidural:6/10    VAS pain score after epidural:2/10    Pt. Tolerated well, FHR stable.

## 2019-04-01 NOTE — PROGRESS NOTES
Emiliana VLIA was notified that the pt did not do her glucose tolerance test.  Baby will be tested for BG per protocol.

## 2019-04-01 NOTE — H&P
L&D History and Physical   2019  Christianne Ferrell  [id number]      HPI: Christianne Ferrell is a 28 year old  at 38w1d by 7w3d US, here for IOL for gHTN    She states that she is feeling well today, had some increase in swelling but otherwise diong well.  + FM, no ctx, VB, or LOF.  No HA, scotoma.     Pregnancy notable for:  --insulin resistance, did not do GCT  --h/o THC use  --tobacco use in pregnancy, about 3/4 ppd  --GBS pos  --gHTN - diagnosed at clinic on Friday, per MD to MD communication, although no mild range BPs found in chart    OBHX:   Obstetric History       T0      L0     SAB0   TAB0   Ectopic0   Multiple0   Live Births0       # Outcome Date GA Lbr Jeremy/2nd Weight Sex Delivery Anes PTL Lv   1 Current                   MedicalHX:   Past Medical History:   Diagnosis Date     Chlamydia        SurgicalHX:   Past Surgical History:   Procedure Laterality Date     SURGICAL HISTORY OF -       T & A     SURGICAL HISTORY OF -       Left eye surgery       Medications:     No current facility-administered medications on file prior to encounter.   Current Outpatient Medications on File Prior to Encounter:  hydrOXYzine (VISTARIL) 50 MG capsule Take 1-2 capsules ( mg) by mouth At Bedtime   Prenatal Vit-Fe Fumarate-FA (PRENATAL MULTIVITAMIN PLUS IRON) 27-0.8 MG TABS per tablet Take 1 tablet by mouth every evening        Allergies:  No Known Allergies    FamilyHX:  Family History   Problem Relation Age of Onset     Cancer Mother         Cervical     Respiratory Mother         COPD     Irritable Bowel Syndrome Mother      Depression Mother      Respiratory Father         Asthma     Alcohol/Drug Maternal Grandfather         alcohol     Respiratory Paternal Grandfather         Asthma     Diabetes Paternal Grandfather      Respiratory Brother         Asthma     Gastrointestinal Disease Brother         Reflux     Kidney Disease Maternal Grandmother      Lung Cancer Paternal Grandmother         SocialHX:   Social History     Socioeconomic History     Marital status:      Spouse name: Not on file     Number of children: Not on file     Years of education: Not on file     Highest education level: Not on file   Occupational History     Not on file   Social Needs     Financial resource strain: Not on file     Food insecurity:     Worry: Not on file     Inability: Not on file     Transportation needs:     Medical: Not on file     Non-medical: Not on file   Tobacco Use     Smoking status: Current Every Day Smoker     Packs/day: 0.50     Types: Cigarettes     Smokeless tobacco: Never Used     Tobacco comment: down to 6-7cig/day with pregnancy   Substance and Sexual Activity     Alcohol use: Yes     Comment: occas- quit with pregnancy     Drug use: Yes     Types: Marijuana     Comment: last use 5/2018     Sexual activity: Yes     Partners: Male     Birth control/protection: Condom   Lifestyle     Physical activity:     Days per week: Not on file     Minutes per session: Not on file     Stress: Not on file   Relationships     Social connections:     Talks on phone: Not on file     Gets together: Not on file     Attends Zoroastrianism service: Not on file     Active member of club or organization: Not on file     Attends meetings of clubs or organizations: Not on file     Relationship status: Not on file     Intimate partner violence:     Fear of current or ex partner: Not on file     Emotionally abused: Not on file     Physically abused: Not on file     Forced sexual activity: Not on file   Other Topics Concern     Parent/sibling w/ CABG, MI or angioplasty before 65F 55M? Not Asked   Social History Narrative     Not on file       ROS: 10-point ROS negative except as in HPI     Physical Exam:  Vitals:    04/01/19 0731 04/01/19 0932 04/01/19 1205   BP: 119/72 116/64 116/74   BP Location: Left arm     Pulse: 103     Resp: 20 20 20   Temp: 98.8  F (37.1  C) 98.9  F (37.2  C) 98.6  F (37  C)   TempSrc: Oral Oral  Oral   Weight: 90.3 kg (199 lb)       GEN: resting comfortably in bed, NAD   CV: RRR, no murmurs  PULM: CTAB, no increased work of breathing, no cough/wheeze   ABD: soft, gravid, non-tender, non-distended  EXT: 1+ edema, non tender to palpation  CVX: 3.5/80/-1      Presentation: vtx by cvx exam  EFW: 7.5 lbs    Labs:   Hgb 12.2, Plts 326  Creat 0.66  ALT 26  ALT 17  P:C 0.20   Lab Results   Component Value Date    ABO O 2019    RH Pos 2019    AS Neg 2019    HEPBANG Nonreactive 2018    CHPCRT Negative 2018    GCPCRT Negative 2018    HGB 12.2 2019       GBS Status:   Lab Results   Component Value Date    GBS Positive (A) 2019       Lab Results   Component Value Date    PAP NIL 10/26/2017       A/P: Christianne Ferrell is a 28 year old female  at 38w1d, here for IOL for gHTN  - GBS pos - PCN for ppx  - pitocin and AROM for augmentation when able  - h/o THC and tobacco use - will do UDS  - gHTN: all normal in hospital, no documentation in notes of abnormal, but per MD to MD communication she has had multiple mild range BPs meeting the definition of gHTN.      Admit for: IOL for gHTN    Shahida Moore MD, MPH  [facility]

## 2019-04-01 NOTE — PROGRESS NOTES
Pt presented to the birthplace at 0700 for an induction of labor for gestational HTN.  Pt did not have her glucose tolerance test done during pregnancy.  Dr. Moore was notified and peds will be notified as well.  Pitocin was started after a reactive NST was obtained and induction consent/education verified in the pt's prenatals.  Pt was positive for THC in pregnancy, urine was sent.

## 2019-04-01 NOTE — ANESTHESIA PREPROCEDURE EVALUATION
Anesthesia Pre-Procedure Evaluation    Patient: Christianne Ferrell   MRN: [id number] : 1990          Preoperative Diagnosis: * No surgery found *        Past Medical History:   Diagnosis Date     Chlamydia      Past Surgical History:   Procedure Laterality Date     SURGICAL HISTORY OF -       T & A     SURGICAL HISTORY OF -       Left eye surgery       Anesthesia Evaluation       history and physical reviewed .             ROS/MED HX    ENT/Pulmonary:  - neg pulmonary ROS     Neurologic:  - neg neurologic ROS     Cardiovascular:     (+) --PIH, --. : . . . :. .       METS/Exercise Tolerance:     Hematologic:         Musculoskeletal:         GI/Hepatic:     (+) GERD       Renal/Genitourinary:         Endo:         Psychiatric:         Infectious Disease:         Malignancy:         Other:                          Physical Exam  Normal systems: cardiovascular, pulmonary and dental    Airway   Mallampati: II  TM distance: > 3 FB  Neck ROM: full  Mouth opening: > 3 cm    Dental     Cardiovascular       Pulmonary             Lab Results   Component Value Date    WBC 14.9 (H) 2019    HGB 12.2 2019    HCT 36.7 2019     2019     2004    POTASSIUM 3.9 2004    CHLORIDE 107 2004    CO2 25 2004    BUN 7 2004    CR 0.66 2019    GLC 96 10/26/2017    KRISTOFER 8.8 2004    ALBUMIN 3.8 2017    PROTTOTAL 8.0 2017    ALT 26 2019    AST 17 2019    ALKPHOS 112 2017    BILITOTAL 0.5 2017    TSH 0.49 10/26/2017    T4 1.18 2008    HCG Negative 2018    HCGS Negative 2017       Preop Vitals  BP Readings from Last 3 Encounters:   19 116/74   19 125/71   19 132/86    Pulse Readings from Last 3 Encounters:   19 103   19 121   19 108      Resp Readings from Last 3 Encounters:   19 20   19 18   19 18    SpO2 Readings from Last 3 Encounters:   17 98%  "  09/23/09 99%   02/27/08 100%      Temp Readings from Last 1 Encounters:   04/01/19 37.1  C (98.7  F) (Oral)    Ht Readings from Last 1 Encounters:   03/29/19 1.575 m (5' 2\")      Wt Readings from Last 1 Encounters:   04/01/19 90.3 kg (199 lb)    Estimated body mass index is 36.4 kg/m  as calculated from the following:    Height as of 3/29/19: 1.575 m (5' 2\").    Weight as of this encounter: 90.3 kg (199 lb).       Anesthesia Plan      History & Physical Review      ASA Status:  .  OB Epidural Asa: 3            Postoperative Care      Consents  Anesthetic plan, risks, benefits and alternatives discussed with:  Patient..                 Juan Mojica CRNA, APRN CRNA  "

## 2019-04-02 PROCEDURE — 0UQGXZZ REPAIR VAGINA, EXTERNAL APPROACH: ICD-10-PCS | Performed by: OBSTETRICS & GYNECOLOGY

## 2019-04-02 PROCEDURE — 59410 OBSTETRICAL CARE: CPT | Performed by: OBSTETRICS & GYNECOLOGY

## 2019-04-02 PROCEDURE — 25800030 ZZH RX IP 258 OP 636: Performed by: OBSTETRICS & GYNECOLOGY

## 2019-04-02 PROCEDURE — 25000128 H RX IP 250 OP 636: Performed by: OBSTETRICS & GYNECOLOGY

## 2019-04-02 PROCEDURE — 10907ZC DRAINAGE OF AMNIOTIC FLUID, THERAPEUTIC FROM PRODUCTS OF CONCEPTION, VIA NATURAL OR ARTIFICIAL OPENING: ICD-10-PCS | Performed by: OBSTETRICS & GYNECOLOGY

## 2019-04-02 PROCEDURE — 12000000 ZZH R&B MED SURG/OB

## 2019-04-02 PROCEDURE — 72200001 ZZH LABOR CARE VAGINAL DELIVERY SINGLE

## 2019-04-02 PROCEDURE — 3E033VJ INTRODUCTION OF OTHER HORMONE INTO PERIPHERAL VEIN, PERCUTANEOUS APPROACH: ICD-10-PCS | Performed by: OBSTETRICS & GYNECOLOGY

## 2019-04-02 PROCEDURE — 25000132 ZZH RX MED GY IP 250 OP 250 PS 637: Performed by: OBSTETRICS & GYNECOLOGY

## 2019-04-02 PROCEDURE — 25000125 ZZHC RX 250: Performed by: OBSTETRICS & GYNECOLOGY

## 2019-04-02 RX ORDER — HYDROCORTISONE 2.5 %
CREAM (GRAM) TOPICAL 3 TIMES DAILY PRN
Status: DISCONTINUED | OUTPATIENT
Start: 2019-04-02 | End: 2019-04-04 | Stop reason: HOSPADM

## 2019-04-02 RX ORDER — LANOLIN 100 %
OINTMENT (GRAM) TOPICAL
Status: DISCONTINUED | OUTPATIENT
Start: 2019-04-02 | End: 2019-04-04 | Stop reason: HOSPADM

## 2019-04-02 RX ORDER — MISOPROSTOL 200 UG/1
800 TABLET ORAL
Status: DISCONTINUED | OUTPATIENT
Start: 2019-04-02 | End: 2019-04-04 | Stop reason: HOSPADM

## 2019-04-02 RX ORDER — IBUPROFEN 800 MG/1
800 TABLET, FILM COATED ORAL EVERY 6 HOURS PRN
Status: DISCONTINUED | OUTPATIENT
Start: 2019-04-02 | End: 2019-04-04 | Stop reason: HOSPADM

## 2019-04-02 RX ORDER — OXYTOCIN/0.9 % SODIUM CHLORIDE 30/500 ML
100 PLASTIC BAG, INJECTION (ML) INTRAVENOUS CONTINUOUS
Status: DISCONTINUED | OUTPATIENT
Start: 2019-04-02 | End: 2019-04-04 | Stop reason: HOSPADM

## 2019-04-02 RX ORDER — AMOXICILLIN 250 MG
1 CAPSULE ORAL 2 TIMES DAILY
Status: DISCONTINUED | OUTPATIENT
Start: 2019-04-02 | End: 2019-04-04 | Stop reason: HOSPADM

## 2019-04-02 RX ORDER — NALOXONE HYDROCHLORIDE 0.4 MG/ML
.1-.4 INJECTION, SOLUTION INTRAMUSCULAR; INTRAVENOUS; SUBCUTANEOUS
Status: DISCONTINUED | OUTPATIENT
Start: 2019-04-02 | End: 2019-04-04 | Stop reason: HOSPADM

## 2019-04-02 RX ORDER — AMOXICILLIN 250 MG
2 CAPSULE ORAL 2 TIMES DAILY
Status: DISCONTINUED | OUTPATIENT
Start: 2019-04-02 | End: 2019-04-04 | Stop reason: HOSPADM

## 2019-04-02 RX ORDER — OXYTOCIN 10 [USP'U]/ML
10 INJECTION, SOLUTION INTRAMUSCULAR; INTRAVENOUS
Status: DISCONTINUED | OUTPATIENT
Start: 2019-04-02 | End: 2019-04-03

## 2019-04-02 RX ORDER — CARBOPROST TROMETHAMINE 250 UG/ML
250 INJECTION, SOLUTION INTRAMUSCULAR
Status: DISCONTINUED | OUTPATIENT
Start: 2019-04-02 | End: 2019-04-04 | Stop reason: HOSPADM

## 2019-04-02 RX ORDER — ACETAMINOPHEN 325 MG/1
650 TABLET ORAL EVERY 4 HOURS PRN
Status: DISCONTINUED | OUTPATIENT
Start: 2019-04-02 | End: 2019-04-04 | Stop reason: HOSPADM

## 2019-04-02 RX ORDER — OXYTOCIN/0.9 % SODIUM CHLORIDE 30/500 ML
340 PLASTIC BAG, INJECTION (ML) INTRAVENOUS CONTINUOUS PRN
Status: DISCONTINUED | OUTPATIENT
Start: 2019-04-02 | End: 2019-04-04 | Stop reason: HOSPADM

## 2019-04-02 RX ORDER — BISACODYL 10 MG
10 SUPPOSITORY, RECTAL RECTAL DAILY PRN
Status: DISCONTINUED | OUTPATIENT
Start: 2019-04-04 | End: 2019-04-04 | Stop reason: HOSPADM

## 2019-04-02 RX ADMIN — SODIUM CHLORIDE, POTASSIUM CHLORIDE, SODIUM LACTATE AND CALCIUM CHLORIDE: 600; 310; 30; 20 INJECTION, SOLUTION INTRAVENOUS at 03:01

## 2019-04-02 RX ADMIN — IBUPROFEN 800 MG: 800 TABLET ORAL at 18:17

## 2019-04-02 RX ADMIN — OXYTOCIN-SODIUM CHLORIDE 0.9% IV SOLN 30 UNIT/500ML 340 ML/HR: 30-0.9/5 SOLUTION at 05:47

## 2019-04-02 RX ADMIN — SODIUM CHLORIDE 2.5 MILLION UNITS: 9 INJECTION, SOLUTION INTRAVENOUS at 05:00

## 2019-04-02 RX ADMIN — SODIUM CHLORIDE 2.5 MILLION UNITS: 9 INJECTION, SOLUTION INTRAVENOUS at 01:00

## 2019-04-02 RX ADMIN — IBUPROFEN 800 MG: 800 TABLET ORAL at 12:02

## 2019-04-02 NOTE — PLAN OF CARE
Data: Vital signs within normal limits. Postpartum checks within normal limits - see flow record. Patient eating and drinking normally. Patient able to empty bladder independently. . Patient ambulating independently..   No apparent signs of infection. Lac 1st degree healing well. Patient is performing self cares and is able to care for infant. Positive attachment behaviors are observed with infant. Support persons are present.  Action:  Pain plan was discussed. Patient will request pain med when she is ready for it. Patient was medicated during the shift for cramping. See MAR.Patient education done about hand hygiene, breastfeeding,  cares, postpartum cares, pain management/plan and fall risk. See flow record.  Response:   Patient reassessed within 1 hour after each medication for pain. Patient stated that pain had improved. Patient stated that she was comfortable. .   Plan: Anticipate discharge on 19.

## 2019-04-02 NOTE — PROGRESS NOTES
Pt resting comfortably at this time with visitors present. Pt declines position change at this time but agrees to change position at 2300 hours.

## 2019-04-02 NOTE — PLAN OF CARE
S:Delivery  B:Induced  Labor,38w2d    Lab Results   Component Value Date    GBS Positive (A) 2019    with antibiotic treatment greater than or equal to 4 hours prior to delivery.  A: Patient delivered   lac 1st degree at 0545 with Dr. GLADIS Moore in attendance and baby placed on mother's abdomen for delayed cord clamping. Baby dried and stimulated. Baby placed  skin to skin @ 0545.. Apgars 9/9.  IV infusion of Oxytocin  infused. Placenta removal spontaneous. MD does not want placenta sent to pathology.  See Flowsheet for VS and PP checks.  .  Labor care plan goals met, transition now to postpartum care.  R: Expect routine postpartum care. Anticipate first feeding within the hour or whenever infant displays feeding cues. Continue skin to skin. Prior discussion with mother indicates that feeding plan is Breast feeding . Educated mother on importance of exclusive breastfeeding, expected feeding readiness cues and encouraged her to observe for these cues while rooming in. Informed her that breastfeeding assistance would be provided.

## 2019-04-02 NOTE — PLAN OF CARE
Assumed care of patient after introduction and bedside report completed. Patient on her right side with peanut ball and denies any pain. Family in room with her. Pitocin was stopped at 2326 for tachysystole. Will continue to monitor.

## 2019-04-02 NOTE — L&D DELIVERY NOTE
"Delivery Summary    28 year old  at 38w2d admitted for IOL for gHTN.  GBS pos, s/p 4h of PCN and pitocin, followed by AROM.  Complete at 0200, labored down for 3 hours.  Pushed actively only three times, delivered a vigorous female infant with apgars 9 and 9, weight 6 lb 11 oz.  Perineum intact, small 1st degree vaginal/hymenal laceration repaired with a short running locked 3-0 vicryl.  Placenta spontanous, intact.  QBL 100cc.  \"Sandy\"    Shahida Moore MD, MPH  [facility]         Labor Event Times    Labor onset date:  19 Onset time:  12:20 PM   Dilation complete date:  19 Complete time:   2:00 AM   Start pushing date/time:  2019 0520      Labor Events     labor?:  No   steroids:  None  Labor Type:  Induction, AROM  Predominate monitoring during 1st stage:  continuous electronic fetal monitoring     Antibiotics received during labor?:  Yes  Reason for Antibiotics:  GBS  Antibiotics received for GBS:  Penicillin  Antibiotics Given (GBS):  Greater than 4 hours prior to delivery     Rupture date/time: 19 1220   Rupture type:  Artificial Rupture of Membranes  Fluid color:  Clear  Fluid odor:  Normal     Induction:  Oxytocin, AROM  Induction date/time:     Cervical ripening date/time:     Indications for induction:  Hypertension     1:1 continuous labor support provided by?:  RN       Delivery/Placenta Date and Time    Delivery Date:  19 Delivery Time:   5:45 AM   Placenta Date/Time:  2019  5:49 AM  Oxytocin given at the time of delivery:  after delivery of baby     Vaginal Counts     Initial count performed by 2 team members:   Two Team Members   Daya CHAU       Needles Suture Baxter Sponges Instruments   Initial counts 2  5    Added to count  1     Final counts       Placed during labor Accounted for at the end of labor   NA NA   NA NA   NA NA           Apgars    Living status:  Living   1 Minute 5 Minute 10 Minute 15 Minute 20 Minute "   Skin color: 1  1       Heart rate: 2  2       Reflex irritability: 2  2       Muscle tone: 2  2       Respiratory effort: 2  2       Total: 9  9       Apgars assigned by:  NESHA LEE RN     Cord    Vessels:  3 Vessels Complications:  None   Cord Blood Disposition:  Lab Gases Sent?:  No      [place] Resuscitation    Methods:  None     Skin to Skin and Feeding Plan    Skin to skin initiation date/time: 1841    Skin to skin end date/time:        Labor Events and Shoulder Dystocia    Fetal Tracing Prior to Delivery:  Category 2  Shoulder dystocia present?:  Neg     Delivery (Maternal) (Provider to Complete) ([id number])    Episiotomy:  None  Perineal lacerations:  None    Vaginal laceration?:  Yes Repaired?:  Yes      Blood Loss  Mother: Christianne Ferrell #[id number]   Start of Mother's Information    IO Blood Loss  19 1220 - 19 0600    None           End of Mother's Information  Mother: Christianne Ferrell #[id number]         Delivery - Provider to Complete ([id number])    Delivering clinician:  Shahida Moore MD  Attempted Delivery Types (Choose all that apply):  Spontaneous Vaginal Delivery  Delivery Type (Choose the 1 that will go to the Birth History):  Vaginal, Spontaneous          Placenta    Delayed Cord Clamping:  Done  Date/Time:  2019  5:49 AM  Removal:  Spontaneous  Disposition:  Hospital disposal     Anesthesia    Method:  Epidural  Cervical dilation at placement:  0-3          Presentation and Position    Presentation:  Vertex  Position:  Middle Occiput Anterior           Shahida Moore MD

## 2019-04-02 NOTE — PLAN OF CARE
Pt resting comfortably and denies pain at this time.  Pt able to move independently in bed or with assist of RN with cords and monitors.  Pt has excellent pain control from PCEA.  VSS, assessment wnl.    RN having difficult time picking up contractions when pt is lying on right or left side with peanut ball.  Pt is not feeling contractions.  SVE 4/95/-1.  Pt desires to lay on back for time being.  HOB elevated and legs butterflied.  Category 1 tracing.  Oxytocin infusion rate increased now that contractions are visible.    Plan:  Continue to monitor and assess.

## 2019-04-02 NOTE — PLAN OF CARE
Pt assessment assessment wnl, vss.  Pt independent with self and  cares.  Pt up ad ebenezer in room.  Pt encouraged to tub this evening for discomfort.  Pt taking ibuprofen for pain and pain is at acceptable.    Plan:  Continue to monitor and assess.

## 2019-04-03 VITALS
WEIGHT: 197 LBS | SYSTOLIC BLOOD PRESSURE: 119 MMHG | DIASTOLIC BLOOD PRESSURE: 69 MMHG | HEART RATE: 89 BPM | TEMPERATURE: 98.4 F | RESPIRATION RATE: 18 BRPM | OXYGEN SATURATION: 97 % | BODY MASS INDEX: 36.03 KG/M2

## 2019-04-03 LAB — HGB BLD-MCNC: 10.6 G/DL (ref 11.7–15.7)

## 2019-04-03 PROCEDURE — 25000132 ZZH RX MED GY IP 250 OP 250 PS 637: Performed by: OBSTETRICS & GYNECOLOGY

## 2019-04-03 PROCEDURE — 85018 HEMOGLOBIN: CPT | Performed by: OBSTETRICS & GYNECOLOGY

## 2019-04-03 PROCEDURE — 36415 COLL VENOUS BLD VENIPUNCTURE: CPT | Performed by: OBSTETRICS & GYNECOLOGY

## 2019-04-03 RX ORDER — IBUPROFEN 800 MG/1
800 TABLET, FILM COATED ORAL EVERY 6 HOURS PRN
Qty: 30 TABLET | Refills: 1 | Status: SHIPPED | OUTPATIENT
Start: 2019-04-03 | End: 2019-05-13

## 2019-04-03 RX ORDER — AMOXICILLIN 250 MG
1 CAPSULE ORAL 2 TIMES DAILY PRN
Qty: 30 TABLET | Refills: 1 | Status: SHIPPED | OUTPATIENT
Start: 2019-04-03 | End: 2019-05-13

## 2019-04-03 RX ADMIN — IBUPROFEN 800 MG: 800 TABLET ORAL at 00:17

## 2019-04-03 NOTE — PLAN OF CARE
Pt is following her postpartum pathway with VSS, assessments WNL, is tolerating her activities and caring for her baby independently. Father of the baby is present and involved.

## 2019-04-03 NOTE — PROGRESS NOTES
Postpartum progress note  4/3/2019     S: Feeling pretty good, got some sleep. Pain is well controlled. Lochia minimal. Ambulating without difficulty. Voiding without difficulty. Passing flatus, no BM yet.  Tolerating po intake.     PHYSICAL EXAM:   Vitals:    19 2000 19 0015 19 0140 19 0800   BP: 138/87  127/73 112/82   BP Location: Right arm      Pulse: 130  92 94   Resp: 16 16 16 18   Temp: 98.2  F (36.8  C)  98.3  F (36.8  C) 97.8  F (36.6  C)   TempSrc: Oral  Oral Oral   SpO2: 98%      Weight:           General: sitting up, alert and cooperative  Abd: soft, non-distended, non-tender. Fundus firm, nontender, 2 cm below umbilicus.   Extremities: calves nontender, trace edema of lower extremities bilaterally    Vitals:    19 0731 19 1530   Weight: 90.3 kg (199 lb) 89.4 kg (197 lb)       Lab Results   Component Value Date    HGB 10.6 2019    HGB 12.2 2019     Blood type:   Lab Results   Component Value Date    RH Pos 2019         ASSESSMENT: 28 year old  PPD 1 s/p  after IOL for gHTN.     PLAN:  - gHTN: BPs high normal now, plan for RTC 1 week after discharge for BP check  - Heme: 12.2 > 10.6, no s/s ABLA  - Feeding: breast and bottle  - Birth control: considering IUD   - Rh status: pos, Rhogam not indicated   - Rubella status: non immune, MMR ordered prior to discharge  - Dispo: home tomorrow    Shahida Moore MD, MPH  [facility]

## 2019-04-03 NOTE — PLAN OF CARE
Data: Vital signs within normal limits. Postpartum checks within normal limits - see flow record. Patient eating and drinking normally. Patient able to empty bladder independently. . Patient ambulating independently..   No apparent signs of infection. 1 degree laceration healing well. Perineum edematous. Patient Is performing self cares and Is able to care for infant. Positive attachment behaviors are observed with infant. Support persons are not  present.  Action:  Pain plan was discussed. Patient would like pain meds to be brought in when they are due. Patient was medicated during the shift for pain and cramping w/stated relief obtained. See MAR.Patient education done about hand hygiene, breastfeeding,  cares, pain management/plan,  safety and rest. See flow record.  Response:   Patient reassessed within 1 hour after each medication for pain. Patient stated that pain had improved. Patient stated that she was comfortable. .   Plan: Anticipate discharge on 19.

## 2019-04-04 NOTE — PROGRESS NOTES
Discussed with Dr Arvizu about pt wanted to discharge at this time.  Aileen already ok'd baby discharge  Pt watching shaken baby, PP dep and hand expression video

## 2019-04-04 NOTE — PROGRESS NOTES
Patient discharged per ambulatory with infant in car seat. Mother verified that her band matches her infant's band #[id number] Discharge instructions given. Encouraged to call for any problems, questions or concerns.

## 2019-04-04 NOTE — DISCHARGE INSTRUCTIONS
[facility]    Discharge Summary  Obstetrics    Date of Admission:  2019  Date of Discharge:  4/3/2019  Discharging Provider:Dr Moore  Discharge Diagnoses   Post Vaginal Delivery        Hospital Course   The patient's hospital course was unremarkable.  She recovered as anticipated and experienced no post-delivery complications.  On discharge, her pain was well controlled. Vaginal bleeding is similar to peak menstrual flow.  Voiding without difficulty.  Ambulating well and tolerating a normal diet.  No fevers.  Breastfeeding well.  Infant is stable.  She was discharged on post-partum day #1.5    Post-partum hemoglobin:   Hemoglobin   Date Value Ref Range Status   2019 10.6 (L) 11.7 - 15.7 g/dL Final       [facility]    Discharge Disposition   Discharged to home   Condition at discharge: Stable    Primary Care Physician   Caro Barrios    Consultations This Hospital Stay   ANESTHESIOLOGY IP CONSULT  HOME CARE POST PARTUM/ IP CONSULT  LACTATION IP CONSULT    Discharge Orders      Activity    Review discharge instructions. Activity as tolerated. Pelvic rest (nothing in the vagina for 6 weeks - no tampons, intercourse or douching).     Reason for your hospital stay    Maternity care     Follow Up and recommended labs and tests    Call Dr. Arvizu OB/GYN clinic or present to the emergency department if you have any of the following:  Temperature > 100.4  Foul smelling vaginal discharge  Bleeding > 1 pad per hour x 2 hrs   Pain not controlled by oral pain meds  Severe constipation or severe nausea or vomiting  Concerns for postpartum depression   Blood pressures symptoms like worsening swelling, severe headache or vision changes     Discharge Instructions - Postpartum visit    Schedule postpartum visit with your provider and return to clinic in 1 week for a BP check and then again in 6 weeks for routine postpartum cares.     Diet    Resume previous diet     Discharge  Medications   Current Discharge Medication List      START taking these medications    Details   ibuprofen (ADVIL/MOTRIN) 800 MG tablet Take 1 tablet (800 mg) by mouth every 6 hours as needed for moderate pain (mild-moderate pain)  Qty: 30 tablet, Refills: 1    Associated Diagnoses:  (spontaneous vaginal delivery)      senna-docusate (SENOKOT-S/PERICOLACE) 8.6-50 MG tablet Take 1 tablet by mouth 2 times daily as needed for constipation  Qty: 30 tablet, Refills: 1    Associated Diagnoses:  (spontaneous vaginal delivery)         STOP taking these medications       hydrOXYzine (VISTARIL) 50 MG capsule Comments:   Reason for Stopping:         Prenatal Vit-Fe Fumarate-FA (PRENATAL MULTIVITAMIN PLUS IRON) 27-0.8 MG TABS per tablet Comments:   Reason for Stopping:             Allergies   No Known Allergies

## 2019-04-04 NOTE — DISCHARGE SUMMARY
"[facility] Vaginal Delivery Discharge Summary    Admit date: 2019  Discharge date: 4/3/2019     Admit Dx:   - 28 year old y/o  at 38w2d  - gestational hypertension  - GBS+    Discharge Dx:  - Same as above    Procedures:  - spontaneous vaginal delivery   - epidural analgesia      Admit HPI:  28 year old  at 38w2d admitted for IOL for gHTN.  GBS pos, s/p 4h of PCN and pitocin, followed by AROM.  Complete at 0200, labored down for 3 hours.  Pushed actively only three times, delivered a vigorous female infant with apgars 9 and 9, weight 6 lb 11 oz.  Perineum intact, small 1st degree vaginal/hymenal laceration repaired with a short running locked 3-0 vicryl.  Placenta spontanous, intact.  QBL 100cc.  \"Sandy\"    Please see her admit H&P for full details of her PMH, PSH, Meds, Allergies and exam on admit.    Her postoperative course was uncomplicated. By the evening of PPD#1, she was meeting all of her postpartum goals and deemed stable for discharge. She was voiding without difficulty, tolerating a regular diet without nausea and vomiting, her pain was well controlled on oral pain medicines and her lochia was appropriate. Her hemoglobin after delivery was 10.6. Her Rh status was POS and Rhogam was not indicated. At the time of discharge, she was breast feeding her infant and planned Mirena IUD for contraception. She was asymptomatic from her gestational hypertension and her BPs had remained normatensive for the 36hrs after delivery.       Lab Results   Component Value Date    HGB 10.6 2019    HGB 12.2 2019     Blood type:   Lab Results   Component Value Date    RH Pos 2019       Discharge/Disposition:  Christianne Ferrell was discharged to home in stable condition with the following instructions/medications:  1) Call for temperature > 100.4, foul smelling vaginal discharge, bleeding > 1 pad per hour x 2 hrs, pain not controlled by oral pain meds, severe constipation or " severe nausea or vomiting, pre-e symptoms.  2) She received contraceptive counseling - planning Mirena IUD.  3) She was instructed to follow-up with her primary OB in 6 weeks for a routine postpartum visit and one week for a BP check.  4) She was instructed to continue her PNV on discharge if she wished to breast feed her infant.  5) She was discharged home with the following medications:      Review of your medicines      START taking      Dose / Directions   ibuprofen 800 MG tablet  Commonly known as:  ADVIL/MOTRIN      Dose:  800 mg  Take 1 tablet (800 mg) by mouth every 6 hours as needed for moderate pain (mild-moderate pain)  Quantity:  30 tablet  Refills:  1     senna-docusate 8.6-50 MG tablet  Commonly known as:  SENOKOT-S/PERICOLACE      Dose:  1 tablet  Take 1 tablet by mouth 2 times daily as needed for constipation  Quantity:  30 tablet  Refills:  1        STOP taking    hydrOXYzine 50 MG capsule  Commonly known as:  VISTARIL        prenatal multivitamin w/iron 27-0.8 MG tablet              Where to get your medicines      These medications were sent to [facility] [address] [address]    Phone:  [phone]     ibuprofen 800 MG tablet    senna-docusate 8.6-50 MG tablet         Anjelica Arvizu MD  [facility]

## 2019-05-13 ENCOUNTER — PRENATAL OFFICE VISIT (OUTPATIENT)
Dept: OBGYN | Facility: CLINIC | Age: 29
End: 2019-05-13
Payer: COMMERCIAL

## 2019-05-13 VITALS
SYSTOLIC BLOOD PRESSURE: 129 MMHG | RESPIRATION RATE: 18 BRPM | DIASTOLIC BLOOD PRESSURE: 73 MMHG | BODY MASS INDEX: 31.47 KG/M2 | HEART RATE: 115 BPM | HEIGHT: 62 IN | TEMPERATURE: 97.3 F | WEIGHT: 171 LBS

## 2019-05-13 DIAGNOSIS — Z30.011 ENCOUNTER FOR INITIAL PRESCRIPTION OF CONTRACEPTIVE PILLS: ICD-10-CM

## 2019-05-13 PROBLEM — Z34.00 PRENATAL CARE, FIRST PREGNANCY: Status: RESOLVED | Noted: 2018-08-09 | Resolved: 2019-05-13

## 2019-05-13 PROBLEM — O13.9 GESTATIONAL HYPERTENSION: Status: RESOLVED | Noted: 2019-04-01 | Resolved: 2019-05-13

## 2019-05-13 PROBLEM — O99.820 GBS (GROUP B STREPTOCOCCUS CARRIER), +RV CULTURE, CURRENTLY PREGNANT: Status: RESOLVED | Noted: 2019-03-25 | Resolved: 2019-05-13

## 2019-05-13 PROBLEM — Z34.90 PREGNANCY: Status: RESOLVED | Noted: 2019-03-30 | Resolved: 2019-05-13

## 2019-05-13 PROCEDURE — 99207 ZZC POST PARTUM EXAM: CPT | Performed by: OBSTETRICS & GYNECOLOGY

## 2019-05-13 RX ORDER — ACETAMINOPHEN AND CODEINE PHOSPHATE 120; 12 MG/5ML; MG/5ML
0.35 SOLUTION ORAL DAILY
Qty: 84 TABLET | Refills: 3 | Status: SHIPPED | OUTPATIENT
Start: 2019-05-13 | End: 2020-09-01

## 2019-05-13 ASSESSMENT — ANXIETY QUESTIONNAIRES
2. NOT BEING ABLE TO STOP OR CONTROL WORRYING: NOT AT ALL
5. BEING SO RESTLESS THAT IT IS HARD TO SIT STILL: NOT AT ALL
6. BECOMING EASILY ANNOYED OR IRRITABLE: SEVERAL DAYS
7. FEELING AFRAID AS IF SOMETHING AWFUL MIGHT HAPPEN: NOT AT ALL
3. WORRYING TOO MUCH ABOUT DIFFERENT THINGS: SEVERAL DAYS
IF YOU CHECKED OFF ANY PROBLEMS ON THIS QUESTIONNAIRE, HOW DIFFICULT HAVE THESE PROBLEMS MADE IT FOR YOU TO DO YOUR WORK, TAKE CARE OF THINGS AT HOME, OR GET ALONG WITH OTHER PEOPLE: NOT DIFFICULT AT ALL
1. FEELING NERVOUS, ANXIOUS, OR ON EDGE: NOT AT ALL
GAD7 TOTAL SCORE: 2

## 2019-05-13 ASSESSMENT — PATIENT HEALTH QUESTIONNAIRE - PHQ9
SUM OF ALL RESPONSES TO PHQ QUESTIONS 1-9: 0
5. POOR APPETITE OR OVEREATING: NOT AT ALL

## 2019-05-13 ASSESSMENT — MIFFLIN-ST. JEOR: SCORE: 1468.9

## 2019-05-13 NOTE — PROGRESS NOTES
".Christianne is a 28 year old female 6 weeks S/P  of a liveborn baby girl.  The delivery was uncomplicated.  She is not still bleeding.  She is breastfeeding, and has selected Micronor for birth control.  Her last PAP smear was , and was Normal; her  PHQ-9 inventory score is: 0    Exam: /73 (BP Location: Right arm, Patient Position: Chair, Cuff Size: Adult Small)   Pulse 115   Temp 97.3  F (36.3  C) (Tympanic)   Resp 18   Ht 1.575 m (5' 2\")   Wt 77.6 kg (171 lb)   LMP 2018   Breastfeeding? Yes   BMI 31.28 kg/m    EGBUS wnl, perineal laceration healed, cervix parous, uterus small, non-tender, no adnexal masses and normal lochia    Assessment:  Postpartum  contraception    Plan:  RTC for annual exams and PRN and prescription given per orders  Carla Cerda MD  [facility]      "

## 2019-05-14 ASSESSMENT — ANXIETY QUESTIONNAIRES: GAD7 TOTAL SCORE: 2

## 2020-01-28 ENCOUNTER — OFFICE VISIT (OUTPATIENT)
Dept: FAMILY MEDICINE | Facility: CLINIC | Age: 30
End: 2020-01-28
Payer: COMMERCIAL

## 2020-01-28 VITALS
HEIGHT: 62 IN | DIASTOLIC BLOOD PRESSURE: 76 MMHG | OXYGEN SATURATION: 98 % | RESPIRATION RATE: 20 BRPM | WEIGHT: 157.6 LBS | HEART RATE: 114 BPM | SYSTOLIC BLOOD PRESSURE: 118 MMHG | TEMPERATURE: 98.3 F | BODY MASS INDEX: 29 KG/M2

## 2020-01-28 DIAGNOSIS — R05.9 COUGH: Primary | ICD-10-CM

## 2020-01-28 LAB
FLUAV+FLUBV AG SPEC QL: NEGATIVE
FLUAV+FLUBV AG SPEC QL: NEGATIVE
SPECIMEN SOURCE: NORMAL

## 2020-01-28 PROCEDURE — 99203 OFFICE O/P NEW LOW 30 MIN: CPT | Performed by: PHYSICIAN ASSISTANT

## 2020-01-28 PROCEDURE — 87804 INFLUENZA ASSAY W/OPTIC: CPT | Performed by: PHYSICIAN ASSISTANT

## 2020-01-28 RX ORDER — ALBUTEROL SULFATE 90 UG/1
2 AEROSOL, METERED RESPIRATORY (INHALATION) EVERY 4 HOURS PRN
Qty: 1 INHALER | Refills: 0 | Status: SHIPPED | OUTPATIENT
Start: 2020-01-28 | End: 2022-11-02

## 2020-01-28 ASSESSMENT — MIFFLIN-ST. JEOR: SCORE: 1403.12

## 2020-01-28 NOTE — PATIENT INSTRUCTIONS
and use the Albuterol inhaler that I prescribed. This is the best medication for cough.   Also start to use Ibuprofen three times a day. I think this will help you feel a lot better.     Upper respiratory infections are usually caused by viruses and, sometimes certain bacteria.  Antibiotics don't help the vast majority of people recover any quicker even when caused by a bacteria.  The body will fight this infection but it needs to be treated well in order to help heal itself.  Rest as needed.  It is ok to reduce food intake if appetite is poor but it is quite important to maintain/increase fluid intake.    For cough, dextromethorphan/guaifenesin combinations help loosen secretions and suppress cough safely without significant risk of sedation. Often 2 puffs four times daily of an albuterol inhaler will help with bronchitis.  This is a prescription medicine.    For nasal congestion and sinus pressure, pseudoephedrine (Sudafed) or phenylephrine is often helpful but it can cause elevations in blood pressure and insomnia.  Short courses of a nasal decongestant spray (Afrin or Neosinephrine) can be appropriate but their use should be restricted to 3 days due to the high risk of nasal addiction.    For pain and fevers, acetaminophen (Tylenol) is most appropriate.  Ibuprofen (Advil) or naproxen (Aleve) are useful too and last longer but they can cause elevation of blood pressure or stomach problems.    Antihistamines (Benadryl, Dimetapp, etc.) cause sedation, confusion, bowel and urinary abnormalities and are of little use for infectious causes of cough and nasal congestion.  Their use should be reserved for allergic symptoms.

## 2020-01-28 NOTE — NURSING NOTE
"Chief Complaint   Patient presents with     URI     Cough, Fever        Initial /76 (BP Location: Right arm, Patient Position: Sitting, Cuff Size: Adult Regular)   Pulse 114   Temp 98.3  F (36.8  C) (Tympanic)   Resp 20   Ht 1.575 m (5' 2\")   Wt 71.5 kg (157 lb 9.6 oz)   SpO2 98%   BMI 28.83 kg/m   Estimated body mass index is 28.83 kg/m  as calculated from the following:    Height as of this encounter: 1.575 m (5' 2\").    Weight as of this encounter: 71.5 kg (157 lb 9.6 oz).    Patient presents to the clinic using No DME    Health Maintenance that is potentially due pending provider review:  NONE    n/a    Is there anyone who you would like to be able to receive your results? No  If yes have patient fill out QUINTIN    Tiara Rincon CMA    "

## 2020-01-28 NOTE — PROGRESS NOTES
Subjective     Christianne Ferrell is a 29 year old female who presents to clinic today for the following health issues:    HPI   ENT Symptoms             Symptoms: cc Present Absent Comment   Fever/Chills  x     Fatigue  x     Muscle Aches  x     Eye Irritation   x    Sneezing   x    Nasal Pa/Drg  x  Drainage in to throat    Sinus Pressure/Pain   x    Loss of smell   x    Dental pain   x    Sore Throat  x     Swollen Glands   x    Ear Pain/Fullness   x    Cough  x     Wheeze  x     Chest Pain  x     Shortness of breath  x     Rash       Other         Symptom duration:  Since Sunday    Symptom severity:     Treatments tried:  Dayquil - not helping that much.     Contacts:  Family,  ear infection.      Rash a few weeks ago.   Diarrhea as well.     Patient Active Problem List   Diagnosis     Esophageal reflux     Depression     Tobacco use disorder     Non morbid obesity, unspecified obesity type     Insulin resistance     PCOS (polycystic ovarian syndrome)     Past Surgical History:   Procedure Laterality Date     SURGICAL HISTORY OF -   2000    T & A     SURGICAL HISTORY OF -   2001    Left eye surgery       Social History     Tobacco Use     Smoking status: Current Every Day Smoker     Packs/day: 0.50     Types: Cigarettes     Smokeless tobacco: Never Used     Tobacco comment: down to 6-7cig/day with pregnancy   Substance Use Topics     Alcohol use: Yes     Comment: occas- quit with pregnancy     Family History   Problem Relation Age of Onset     Cancer Mother         Cervical     Respiratory Mother         COPD     Irritable Bowel Syndrome Mother      Depression Mother      Respiratory Father         Asthma     Alcohol/Drug Maternal Grandfather         alcohol     Respiratory Paternal Grandfather         Asthma     Diabetes Paternal Grandfather      Respiratory Brother         Asthma     Gastrointestinal Disease Brother         Reflux     Kidney Disease Maternal Grandmother      Lung Cancer Paternal Grandmother  "         Current Outpatient Medications   Medication Sig Dispense Refill     albuterol (PROAIR HFA/PROVENTIL HFA/VENTOLIN HFA) 108 (90 Base) MCG/ACT inhaler Inhale 2 puffs into the lungs every 4 hours as needed for shortness of breath / dyspnea or wheezing (cough) 1 Inhaler 0     norethindrone (MICRONOR) 0.35 MG tablet Take 1 tablet (0.35 mg) by mouth daily (Patient not taking: Reported on 1/28/2020) 84 tablet 3     No Known Allergies    Reviewed and updated as needed this visit by Provider  Tobacco  Allergies  Meds  Problems  Med Hx  Surg Hx  Fam Hx         Review of Systems   ROS COMP: Constitutional, HEENT, cardiovascular, pulmonary, gi and gu systems are negative, except as otherwise noted.      Objective    /76 (BP Location: Right arm, Patient Position: Sitting, Cuff Size: Adult Regular)   Pulse 114   Temp 98.3  F (36.8  C) (Tympanic)   Resp 20   Ht 1.575 m (5' 2\")   Wt 71.5 kg (157 lb 9.6 oz)   SpO2 98%   BMI 28.83 kg/m    Body mass index is 28.83 kg/m .  Physical Exam   GENERAL: healthy, alert and no distress  EYES: Eyes grossly normal to inspection, PERRL and conjunctivae and sclerae normal  HENT: ear canals and TM's normal, nose and mouth without ulcers or lesions  NECK: no adenopathy, no asymmetry, masses, or scars and thyroid normal to palpation  RESP: lungs clear to auscultation - no rales, rhonchi or wheezes  CV: regular rate and rhythm, normal S1 S2, no S3 or S4, no murmur, click or rub, no peripheral edema and peripheral pulses strong  ABDOMEN: soft, nontender, no hepatosplenomegaly, no masses and bowel sounds normal  MS: no gross musculoskeletal defects noted, no edema  SKIN: no suspicious lesions or rashes    Rapid flu screen: Negative        Assessment & Plan     (R05) Cough  (primary encounter diagnosis)  Comment: Patient presents with 2 days of upper respiratory symptoms.   ill with similar symptoms at home.  She has not really tried any over-the-counter therapies.  Her " "vitals and exam today were largely unremarkable.  Her rapid flu test was negative. We will treat conservatively for viral illness.  An Rx for albuterol was sent to the pharmacy.  She also used ibuprofen and DayQuil as well.  Return to clinic in 1 to 2 weeks if symptoms do not improve or for any new worsening or changing symptoms.  Plan: Influenza A/B antigen, albuterol (PROAIR         HFA/PROVENTIL HFA/VENTOLIN HFA) 108 (90 Base)         MCG/ACT inhaler      Tobacco Cessation:   reports that she has been smoking cigarettes. She has been smoking about 0.50 packs per day. She has never used smokeless tobacco.  Tobacco Cessation Action Plan: Information offered: Patient not interested at this time    BMI:   Estimated body mass index is 28.83 kg/m  as calculated from the following:    Height as of this encounter: 1.575 m (5' 2\").    Weight as of this encounter: 71.5 kg (157 lb 9.6 oz).   Weight management plan: Patient was referred to their PCP to discuss a diet and exercise plan.      See Patient Instructions    Return in about 2 weeks (around 2/11/2020), or if symptoms worsen or fail to improve.    Edwardo Mccollum PA-C  [facility]      "

## 2020-09-01 ENCOUNTER — PRENATAL OFFICE VISIT (OUTPATIENT)
Dept: OBGYN | Facility: CLINIC | Age: 30
End: 2020-09-01

## 2020-09-01 ENCOUNTER — APPOINTMENT (OUTPATIENT)
Dept: OBGYN | Facility: CLINIC | Age: 30
End: 2020-09-01
Payer: COMMERCIAL

## 2020-09-01 DIAGNOSIS — Z34.80 PRENATAL CARE, SUBSEQUENT PREGNANCY: ICD-10-CM

## 2020-09-01 PROCEDURE — 99207 ZZC NO CHARGE NURSE ONLY: CPT | Performed by: OBSTETRICS & GYNECOLOGY

## 2020-09-01 RX ORDER — PRENATAL VIT/IRON FUM/FOLIC AC 27MG-0.8MG
1 TABLET ORAL DAILY
COMMUNITY
Start: 2022-09-07 | End: 2023-06-26

## 2020-09-21 ENCOUNTER — PRENATAL OFFICE VISIT (OUTPATIENT)
Dept: OBGYN | Facility: CLINIC | Age: 30
End: 2020-09-21
Payer: COMMERCIAL

## 2020-09-21 VITALS
TEMPERATURE: 98.5 F | RESPIRATION RATE: 18 BRPM | HEIGHT: 62 IN | DIASTOLIC BLOOD PRESSURE: 67 MMHG | SYSTOLIC BLOOD PRESSURE: 109 MMHG | HEART RATE: 92 BPM | WEIGHT: 156 LBS | BODY MASS INDEX: 28.71 KG/M2

## 2020-09-21 DIAGNOSIS — E88.819 INSULIN RESISTANCE: ICD-10-CM

## 2020-09-21 DIAGNOSIS — Z34.81 PRENATAL CARE, SUBSEQUENT PREGNANCY IN FIRST TRIMESTER: Primary | ICD-10-CM

## 2020-09-21 DIAGNOSIS — E28.2 PCOS (POLYCYSTIC OVARIAN SYNDROME): ICD-10-CM

## 2020-09-21 LAB
ABO + RH BLD: NORMAL
ABO + RH BLD: NORMAL
ALBUMIN UR-MCNC: NEGATIVE MG/DL
ALT SERPL W P-5'-P-CCNC: 64 U/L (ref 0–50)
AMPHETAMINES UR QL: NOT DETECTED NG/ML
APPEARANCE UR: CLEAR
AST SERPL W P-5'-P-CCNC: 28 U/L (ref 0–45)
BARBITURATES UR QL SCN: NOT DETECTED NG/ML
BENZODIAZ UR QL SCN: NOT DETECTED NG/ML
BILIRUB UR QL STRIP: NEGATIVE
BLD GP AB SCN SERPL QL: NORMAL
BLOOD BANK CMNT PATIENT-IMP: NORMAL
BUPRENORPHINE UR QL: NOT DETECTED NG/ML
CANNABINOIDS UR QL: ABNORMAL NG/ML
COCAINE UR QL SCN: NOT DETECTED NG/ML
COLOR UR AUTO: YELLOW
CREAT SERPL-MCNC: 0.63 MG/DL (ref 0.52–1.04)
CREAT UR-MCNC: 160 MG/DL
D-METHAMPHET UR QL: NOT DETECTED NG/ML
ERYTHROCYTE [DISTWIDTH] IN BLOOD BY AUTOMATED COUNT: 11.9 % (ref 10–15)
GFR SERPL CREATININE-BSD FRML MDRD: >90 ML/MIN/{1.73_M2}
GLUCOSE UR STRIP-MCNC: NEGATIVE MG/DL
HBA1C MFR BLD: 5.2 % (ref 0–5.6)
HCT VFR BLD AUTO: 40.8 % (ref 35–47)
HGB BLD-MCNC: 14 G/DL (ref 11.7–15.7)
HGB UR QL STRIP: NEGATIVE
KETONES UR STRIP-MCNC: NEGATIVE MG/DL
LEUKOCYTE ESTERASE UR QL STRIP: NEGATIVE
MCH RBC QN AUTO: 31 PG (ref 26.5–33)
MCHC RBC AUTO-ENTMCNC: 34.3 G/DL (ref 31.5–36.5)
MCV RBC AUTO: 91 FL (ref 78–100)
METHADONE UR QL SCN: NOT DETECTED NG/ML
NITRATE UR QL: NEGATIVE
OPIATES UR QL SCN: NOT DETECTED NG/ML
OXYCODONE UR QL SCN: NOT DETECTED NG/ML
PCP UR QL SCN: NOT DETECTED NG/ML
PH UR STRIP: 6.5 PH (ref 5–7)
PLATELET # BLD AUTO: 306 10E9/L (ref 150–450)
PROPOXYPH UR QL: NOT DETECTED NG/ML
PROT UR-MCNC: 0.15 G/L
PROT/CREAT 24H UR: 0.09 G/G CR (ref 0–0.2)
RBC # BLD AUTO: 4.51 10E12/L (ref 3.8–5.2)
SOURCE: NORMAL
SP GR UR STRIP: 1.02 (ref 1–1.03)
SPECIMEN EXP DATE BLD: NORMAL
TRICYCLICS UR QL SCN: NOT DETECTED NG/ML
URATE SERPL-MCNC: 3.2 MG/DL (ref 2.6–6)
UROBILINOGEN UR STRIP-ACNC: 0.2 EU/DL (ref 0.2–1)
WBC # BLD AUTO: 10.7 10E9/L (ref 4–11)

## 2020-09-21 PROCEDURE — 99207 ZZC FIRST OB VISIT: CPT | Performed by: OBSTETRICS & GYNECOLOGY

## 2020-09-21 PROCEDURE — 86901 BLOOD TYPING SEROLOGIC RH(D): CPT | Performed by: OBSTETRICS & GYNECOLOGY

## 2020-09-21 PROCEDURE — 84450 TRANSFERASE (AST) (SGOT): CPT | Performed by: OBSTETRICS & GYNECOLOGY

## 2020-09-21 PROCEDURE — 83036 HEMOGLOBIN GLYCOSYLATED A1C: CPT | Performed by: OBSTETRICS & GYNECOLOGY

## 2020-09-21 PROCEDURE — 84156 ASSAY OF PROTEIN URINE: CPT | Performed by: OBSTETRICS & GYNECOLOGY

## 2020-09-21 PROCEDURE — 87086 URINE CULTURE/COLONY COUNT: CPT | Performed by: OBSTETRICS & GYNECOLOGY

## 2020-09-21 PROCEDURE — 81003 URINALYSIS AUTO W/O SCOPE: CPT | Performed by: OBSTETRICS & GYNECOLOGY

## 2020-09-21 PROCEDURE — 87340 HEPATITIS B SURFACE AG IA: CPT | Performed by: OBSTETRICS & GYNECOLOGY

## 2020-09-21 PROCEDURE — 80306 DRUG TEST PRSMV INSTRMNT: CPT | Mod: 59 | Performed by: OBSTETRICS & GYNECOLOGY

## 2020-09-21 PROCEDURE — 82565 ASSAY OF CREATININE: CPT | Performed by: OBSTETRICS & GYNECOLOGY

## 2020-09-21 PROCEDURE — 86780 TREPONEMA PALLIDUM: CPT | Performed by: OBSTETRICS & GYNECOLOGY

## 2020-09-21 PROCEDURE — 86850 RBC ANTIBODY SCREEN: CPT | Performed by: OBSTETRICS & GYNECOLOGY

## 2020-09-21 PROCEDURE — 36415 COLL VENOUS BLD VENIPUNCTURE: CPT | Performed by: OBSTETRICS & GYNECOLOGY

## 2020-09-21 PROCEDURE — G0145 SCR C/V CYTO,THINLAYER,RESCR: HCPCS | Performed by: OBSTETRICS & GYNECOLOGY

## 2020-09-21 PROCEDURE — 87389 HIV-1 AG W/HIV-1&-2 AB AG IA: CPT | Performed by: OBSTETRICS & GYNECOLOGY

## 2020-09-21 PROCEDURE — 84460 ALANINE AMINO (ALT) (SGPT): CPT | Performed by: OBSTETRICS & GYNECOLOGY

## 2020-09-21 PROCEDURE — 86762 RUBELLA ANTIBODY: CPT | Performed by: OBSTETRICS & GYNECOLOGY

## 2020-09-21 PROCEDURE — 84550 ASSAY OF BLOOD/URIC ACID: CPT | Performed by: OBSTETRICS & GYNECOLOGY

## 2020-09-21 PROCEDURE — 87591 N.GONORRHOEAE DNA AMP PROB: CPT | Performed by: OBSTETRICS & GYNECOLOGY

## 2020-09-21 PROCEDURE — 85027 COMPLETE CBC AUTOMATED: CPT | Performed by: OBSTETRICS & GYNECOLOGY

## 2020-09-21 PROCEDURE — 76817 TRANSVAGINAL US OBSTETRIC: CPT | Performed by: OBSTETRICS & GYNECOLOGY

## 2020-09-21 PROCEDURE — 86900 BLOOD TYPING SEROLOGIC ABO: CPT | Performed by: OBSTETRICS & GYNECOLOGY

## 2020-09-21 PROCEDURE — 87491 CHLMYD TRACH DNA AMP PROBE: CPT | Performed by: OBSTETRICS & GYNECOLOGY

## 2020-09-21 ASSESSMENT — MIFFLIN-ST. JEOR: SCORE: 1395.86

## 2020-09-21 NOTE — NURSING NOTE
"Initial /67 (BP Location: Left arm, Patient Position: Chair, Cuff Size: Adult Regular)   Pulse 92   Temp 98.5  F (36.9  C) (Tympanic)   Resp 18   Ht 1.575 m (5' 2\")   Wt 70.8 kg (156 lb)   LMP 07/20/2020   BMI 28.53 kg/m   Estimated body mass index is 28.53 kg/m  as calculated from the following:    Height as of this encounter: 1.575 m (5' 2\").    Weight as of this encounter: 70.8 kg (156 lb). .      "

## 2020-09-21 NOTE — PROGRESS NOTES
Discussed physician coverage, tertiary support, diet, exercise, weight gain, schedule of visits, routine and indicated ultrasounds, childbirth education and antepartum testing for certain birth defects.  Encouraged patient to review contents of Prenatal Breastfeeding Education Toolkit. Offered opportunity to answer questions regarding the importance of skin to skin contact, early initiation of exclusive breastfeeding for the first six months and rooming in while in the hospital.    Syphilis is a sexually transmitted disease that can cause birth defects in the babies of untreated mothers. Every pregnant patient is tested for syphilis early in each pregnancy as part of the routine lab work. The [place] Department of [facility] has seen an increase in the rate of syphilis in [place]. The [facility] now recommends testing for syphilis 2 times during a pregnancy, the new prenatal visit, and 28 weeks or when admitted for delivery. Patient accepts lab testing for syphilis.    Group call support for deliveries was discussed, as well as tertiary support in event of high risk issues within [facility].    Discussed current state of COVID-19 in pregnancy, when to seek out emergency care, how to self care at home with milder symptoms.        Options for  testing for birth defects were discussed with the patient, generally including CVS testing, Quad screen serum testing, nuchal lucency/blood marker testing, genetic amniocentesis, Verifi testing  and/or level 2 ultrasound.    Current issues include: nausea, moderate    Past medical, surgical, social and family histories reviewed on OB questionaire and included on episode summary.  Pertinent review of systems items stated above. See OB questionaire for pertinent components of HPI.    Past Medical History:   Diagnosis Date     Chlamydia      Pregnancy induced hypertension      See epic chart    Past Surgical History:   Procedure Laterality Date      "SURGICAL HISTORY OF -   2000    T & A     SURGICAL HISTORY OF -   2001    Left eye surgery     See epic chart    Patient Active Problem List    Diagnosis Date Noted     Prenatal care, subsequent pregnancy 09/01/2020     Priority: Medium     Insulin resistance 06/25/2018     Priority: Medium     Metformin 750mg QD>>750mg po BID 6/25/2018/ stopped Metformin when conceived         PCOS (polycystic ovarian syndrome) 06/25/2018     Priority: Medium     On Metformin; day 21 prog low  PLAN: Femara  Cycle #1 Femara 2.5mg po day 3-7; day 21 prog/ CONCEIVED PRIOR TO TAKING FEMARA       Non morbid obesity, unspecified obesity type 08/31/2016     Priority: Medium     Tobacco use disorder 08/28/2015     Priority: Medium     Depression 11/18/2008     Priority: Medium     Esophageal reflux 03/22/2006     Priority: Medium       OBJECTIVE: /67 (BP Location: Left arm, Patient Position: Chair, Cuff Size: Adult Regular)   Pulse 92   Temp 98.5  F (36.9  C) (Tympanic)   Resp 18   Ht 1.575 m (5' 2\")   Wt 70.8 kg (156 lb)   LMP 07/24/2020   BMI 28.53 kg/m      GENERAL APPEARANCE: healthy, alert and no distress  ABDOMEN:  soft, nontender, no hepato-splenomegaly or hernias  PELVIC:  EGBUS:  within normal limits  VAGINA:  normoestrogenic, well-supported, no unusual discharge  CERVIX:  smooth, non-friable, no gross lesions, thin-layer PAP was taken  UTERUS:  anteverted, enlarged, 8 weeks size and smooth  ADNEXAE:  non-tender, no masses palpable, no cul de sac nodularity     NECK: no adenopathy, no asymmetry, masses, or scars and thyroid normal to palpation     RESP: lungs clear to auscultation , respiratory effort WNL     CV: regular rates and rhythm, normal S1 S2, no S3 or S4 and no murmur, click or rub -     SKIN: no suspicious lesions or rashes     PSYCH: mentation appears normal. and affect normal/bright, oriented to person/place/time     LYMPHATICS: No axillary, cervical, inguinal, or supraclavicular nodes    Transvaginal " ultrasound was performed. A live single intrauterine pregnancy was seen.  CRL=1.92 cm, c/w 8 weeks, 3 days.  EDC by sono =4/26/21;   EDC by LMP=4/26/21    Fetal heart motion was visualized. YXE=383 bpm              ASSESSMENT:    ICD-10-CM    1. Prenatal care, subsequent pregnancy in first trimester  Z34.81 ABO/Rh type and screen     Hepatitis B surface antigen     Rubella Antibody IgG Quantitative     Urine Culture Aerobic Bacterial     *UA reflex to Microscopic     HIV Antigen Antibody Combo     US OB <14 Weeks w Transvaginal Single     Urine Drugs of Abuse Screen Panel 13     Treponema Abs w Reflex to RPR and Titer     Chlamydia trachomatis PCR     Neisseria gonorrhoeae PCR     Pap imaged thin layer screen reflex to HPV if ASCUS - recommend age 25 - 29     CBC with platelets     ALT     AST     Uric acid     Creatinine     Protein  random urine with Creat Ratio     Hemoglobin A1c         PLAN: see orders and OB problem list annotations  Baseline pre-E labs and A1c    Carla Cerda MD

## 2020-09-22 LAB
BACTERIA SPEC CULT: NO GROWTH
C TRACH DNA SPEC QL NAA+PROBE: NEGATIVE
HBV SURFACE AG SERPL QL IA: NONREACTIVE
HIV 1+2 AB+HIV1 P24 AG SERPL QL IA: NONREACTIVE
Lab: NORMAL
N GONORRHOEA DNA SPEC QL NAA+PROBE: NEGATIVE
RUBV IGG SERPL IA-ACNC: 8 IU/ML
SPECIMEN SOURCE: NORMAL
T PALLIDUM AB SER QL: NONREACTIVE

## 2020-09-25 LAB
COPATH REPORT: NORMAL
PAP: NORMAL

## 2020-10-05 ENCOUNTER — TRANSFERRED RECORDS (OUTPATIENT)
Dept: HEALTH INFORMATION MANAGEMENT | Facility: CLINIC | Age: 30
End: 2020-10-05

## 2020-10-05 ENCOUNTER — ALLIED HEALTH/NURSE VISIT (OUTPATIENT)
Dept: OBGYN | Facility: CLINIC | Age: 30
End: 2020-10-05
Payer: COMMERCIAL

## 2020-10-05 VITALS — BODY MASS INDEX: 29.26 KG/M2 | WEIGHT: 159 LBS | HEIGHT: 62 IN

## 2020-10-05 DIAGNOSIS — Z34.81 PRENATAL CARE, SUBSEQUENT PREGNANCY IN FIRST TRIMESTER: Primary | ICD-10-CM

## 2020-10-05 PROCEDURE — 99N1100 PR STATISTIC VERIFI PRENATAL TRISOMY 21,18,13: Mod: 90 | Performed by: OBSTETRICS & GYNECOLOGY

## 2020-10-05 PROCEDURE — 99207 PR NO CHARGE NURSE ONLY: CPT

## 2020-10-05 PROCEDURE — 99000 SPECIMEN HANDLING OFFICE-LAB: CPT | Performed by: OBSTETRICS & GYNECOLOGY

## 2020-10-05 ASSESSMENT — MIFFLIN-ST. JEOR: SCORE: 1409.47

## 2020-10-12 ENCOUNTER — TELEPHONE (OUTPATIENT)
Dept: OBGYN | Facility: CLINIC | Age: 30
End: 2020-10-12

## 2020-10-12 DIAGNOSIS — R11.2 NAUSEA AND VOMITING, INTRACTABILITY OF VOMITING NOT SPECIFIED, UNSPECIFIED VOMITING TYPE: Primary | ICD-10-CM

## 2020-10-12 RX ORDER — ONDANSETRON 4 MG/1
4 TABLET, ORALLY DISINTEGRATING ORAL EVERY 6 HOURS PRN
Qty: 20 TABLET | Refills: 3 | Status: ON HOLD | OUTPATIENT
Start: 2020-10-12 | End: 2021-04-18

## 2020-10-12 NOTE — TELEPHONE ENCOUNTER
Results received from [facility] testing in [place] triage.    Testing done:  Innatal Prenatal Screen    Action:  Spoke to patient and gave NORMAL results and routed to provider.     Gender:  Gender revealed to the patient on the phone. The gender is male    Mariah Avery RN

## 2020-10-12 NOTE — TELEPHONE ENCOUNTER
Spoke to patient with [facility] results and she reports that she is struggling with nausea and vomiting today and over the weekend.  She reports that she does not like taking pills but is requesting a prescription for Zofran.  Pt reports that she is trying to stay hydrated, she was encouraged to take small sips and wait 15 minutes to see if her stomach tolerates the fluids, then continue with more sips etc.    Mariah Avery  [facility] [facility] RN

## 2020-10-20 PROBLEM — Z87.59 HISTORY OF GESTATIONAL HYPERTENSION: Status: ACTIVE | Noted: 2020-10-20

## 2020-10-22 LAB — LAB SCANNED RESULT: NORMAL

## 2020-11-30 NOTE — PROGRESS NOTES
"[facility]    Return OB Note    CC: Return OB     Subjective:  Christianne is a 29 year old  at 18w3d   Denies vaginal bleeding, LOF, or regular contractions. Good fetal movement. Nausea and vomiting improved.   Complaints today: Intermittent vaginal discharge, sometimes yellow.    Objective:  /69 (BP Location: Right arm, Patient Position: Chair, Cuff Size: Adult Regular)   Pulse 107   Temp 98.8  F (37.1  C) (Tympanic)   Resp 18   Ht 1.575 m (5' 2\")   Wt 72.1 kg (159 lb)   LMP 2020   BMI 29.08 kg/m      Fundal height: 19cm  FHT: 150bpm    Assessment/Plan:   Encounter Diagnoses   Name Primary?     Prenatal care, subsequent pregnancy in second trimester Yes     Tobacco use disorder      History of gestational hypertension      Limited prenatal care in second trimester      Vaginal discharge      Tetrahydrocannabinol (THC) use disorder, mild, abuse        IUP at 18w3d  -History gestational HTN: baseline HELLP labs WNL except ALT 64, ASA started today  -THC positive on UDS: discussed discontinuation. Aware we will test at time of delivery and social work involvement if positive.  -Tobacco use: down to half a pack a day and continuing to try to decrease. Encouragement provided.   -Anatomy US ordered today.  -Wet prep for vaginal discharge.  Strict return precautions given    RTC 4 weeks    Shanice Smith DO"

## 2020-12-03 ENCOUNTER — PRENATAL OFFICE VISIT (OUTPATIENT)
Dept: OBGYN | Facility: CLINIC | Age: 30
End: 2020-12-03
Payer: COMMERCIAL

## 2020-12-03 VITALS
BODY MASS INDEX: 29.26 KG/M2 | WEIGHT: 159 LBS | TEMPERATURE: 98.8 F | SYSTOLIC BLOOD PRESSURE: 130 MMHG | RESPIRATION RATE: 18 BRPM | DIASTOLIC BLOOD PRESSURE: 69 MMHG | HEART RATE: 107 BPM | HEIGHT: 62 IN

## 2020-12-03 DIAGNOSIS — Z87.59 HISTORY OF GESTATIONAL HYPERTENSION: ICD-10-CM

## 2020-12-03 DIAGNOSIS — O09.32 LIMITED PRENATAL CARE IN SECOND TRIMESTER: ICD-10-CM

## 2020-12-03 DIAGNOSIS — N89.8 VAGINAL DISCHARGE: ICD-10-CM

## 2020-12-03 DIAGNOSIS — Z34.82 PRENATAL CARE, SUBSEQUENT PREGNANCY IN SECOND TRIMESTER: Primary | ICD-10-CM

## 2020-12-03 DIAGNOSIS — F12.10 TETRAHYDROCANNABINOL (THC) USE DISORDER, MILD, ABUSE: ICD-10-CM

## 2020-12-03 DIAGNOSIS — B96.89 BV (BACTERIAL VAGINOSIS): Primary | ICD-10-CM

## 2020-12-03 DIAGNOSIS — F17.200 TOBACCO USE DISORDER: ICD-10-CM

## 2020-12-03 DIAGNOSIS — N76.0 BV (BACTERIAL VAGINOSIS): Primary | ICD-10-CM

## 2020-12-03 LAB
SPECIMEN SOURCE: ABNORMAL
WET PREP SPEC: ABNORMAL

## 2020-12-03 PROCEDURE — 87210 SMEAR WET MOUNT SALINE/INK: CPT | Performed by: OBSTETRICS & GYNECOLOGY

## 2020-12-03 PROCEDURE — 99207 PR PRENATAL VISIT: CPT | Performed by: OBSTETRICS & GYNECOLOGY

## 2020-12-03 RX ORDER — METRONIDAZOLE 7.5 MG/G
1 GEL VAGINAL AT BEDTIME
Qty: 70 G | Refills: 0 | Status: SHIPPED | OUTPATIENT
Start: 2020-12-03 | End: 2020-12-08

## 2020-12-03 ASSESSMENT — MIFFLIN-ST. JEOR: SCORE: 1409.47

## 2020-12-03 NOTE — NURSING NOTE
"Initial /69 (BP Location: Right arm, Patient Position: Chair, Cuff Size: Adult Regular)   Pulse 107   Temp 98.8  F (37.1  C) (Tympanic)   Resp 18   Ht 1.575 m (5' 2\")   Wt 72.1 kg (159 lb)   LMP 07/24/2020   BMI 29.08 kg/m   Estimated body mass index is 29.08 kg/m  as calculated from the following:    Height as of this encounter: 1.575 m (5' 2\").    Weight as of this encounter: 72.1 kg (159 lb). .      "

## 2021-01-07 ENCOUNTER — HOSPITAL ENCOUNTER (OUTPATIENT)
Dept: ULTRASOUND IMAGING | Facility: CLINIC | Age: 31
Discharge: HOME | End: 2021-01-07
Attending: OBSTETRICS & GYNECOLOGY | Admitting: OBSTETRICS & GYNECOLOGY
Payer: COMMERCIAL

## 2021-01-07 ENCOUNTER — PRENATAL OFFICE VISIT (OUTPATIENT)
Dept: OBGYN | Facility: CLINIC | Age: 31
End: 2021-01-07
Payer: COMMERCIAL

## 2021-01-07 VITALS
WEIGHT: 164.9 LBS | SYSTOLIC BLOOD PRESSURE: 133 MMHG | HEIGHT: 62 IN | BODY MASS INDEX: 30.35 KG/M2 | HEART RATE: 114 BPM | RESPIRATION RATE: 14 BRPM | DIASTOLIC BLOOD PRESSURE: 74 MMHG | TEMPERATURE: 96.4 F

## 2021-01-07 DIAGNOSIS — Z34.82 PRENATAL CARE, SUBSEQUENT PREGNANCY IN SECOND TRIMESTER: ICD-10-CM

## 2021-01-07 DIAGNOSIS — O09.32 LIMITED PRENATAL CARE IN SECOND TRIMESTER: ICD-10-CM

## 2021-01-07 DIAGNOSIS — Z87.59 HISTORY OF GESTATIONAL HYPERTENSION: ICD-10-CM

## 2021-01-07 DIAGNOSIS — Z34.82 PRENATAL CARE, SUBSEQUENT PREGNANCY IN SECOND TRIMESTER: Primary | ICD-10-CM

## 2021-01-07 DIAGNOSIS — F17.200 TOBACCO USE DISORDER: ICD-10-CM

## 2021-01-07 DIAGNOSIS — R74.01 ELEVATED ALT MEASUREMENT: ICD-10-CM

## 2021-01-07 DIAGNOSIS — Z34.90 ENCOUNTER FOR PRENATAL CARE: Primary | ICD-10-CM

## 2021-01-07 PROCEDURE — 99207 PR PRENATAL VISIT: CPT | Performed by: OBSTETRICS & GYNECOLOGY

## 2021-01-07 PROCEDURE — 76805 OB US >/= 14 WKS SNGL FETUS: CPT

## 2021-01-07 ASSESSMENT — MIFFLIN-ST. JEOR: SCORE: 1431.23

## 2021-01-07 NOTE — PROGRESS NOTES
"[facility]    Return OB Note    CC: Return OB     Subjective:  Christianne is a 30 year old  at 23w6d   Denies vaginal bleeding, LOF, or regular contractions. Occasional [place] Woo. Good fetal movement.  Complaints today: None    Objective:  /74 (BP Location: Right arm, Patient Position: Sitting, Cuff Size: Adult Regular)   Pulse 114   Temp 96.4  F (35.8  C) (Tympanic)   Resp 14   Ht 1.575 m (5' 2\")   Wt 74.8 kg (164 lb 14.4 oz)   LMP 2020   BMI 30.16 kg/m      Fundal height: 24cm  FHT: 150bpm    Assessment/Plan:   Encounter Diagnoses   Name Primary?     Prenatal care, subsequent pregnancy in second trimester Yes     History of gestational hypertension      Tobacco use disorder      Limited prenatal care in second trimester      Elevated ALT measurement        IUP at 23w6d  -History of GHTN: ASA, baseline HELLP labs WNL except elevated ALT. Plan repeat transaminases with mid trimester labs.  -Tobacco use: down to 2 cigarettes per day  -Unable to stay for glucola today, will come back in 3 weeks to complete  -Anatomy US today after appointment  -Strict return precautions given    RTC 3 weeks    Shanice Smith DO    "

## 2021-01-07 NOTE — NURSING NOTE
"Initial /74 (BP Location: Right arm, Patient Position: Sitting, Cuff Size: Adult Regular)   Pulse 114   Temp 96.4  F (35.8  C) (Tympanic)   Resp 14   Ht 1.575 m (5' 2\")   Wt 74.8 kg (164 lb 14.4 oz)   LMP 07/24/2020   BMI 30.16 kg/m   Estimated body mass index is 30.16 kg/m  as calculated from the following:    Height as of this encounter: 1.575 m (5' 2\").    Weight as of this encounter: 74.8 kg (164 lb 14.4 oz). .      "

## 2021-01-08 ENCOUNTER — TELEPHONE (OUTPATIENT)
Dept: OBGYN | Facility: CLINIC | Age: 31
End: 2021-01-08

## 2021-01-08 NOTE — TELEPHONE ENCOUNTER
Informed of normal results  Patient to call and schedule repeat ultrasound    Mario GONZALEZ RN   [facility]

## 2021-01-08 NOTE — TELEPHONE ENCOUNTER
Reason for Call:  Other call back    Detailed comments: patient is returning call, please call back. Thank you.    Phone Number Patient can be reached at: Home number on file [phone] (home)    Best Time:     Can we leave a detailed message on this number? YES    Call taken on 1/8/2021 at 10:59 AM by Bettina Perez

## 2021-01-18 ENCOUNTER — HOSPITAL ENCOUNTER (OUTPATIENT)
Dept: ULTRASOUND IMAGING | Facility: CLINIC | Age: 31
Discharge: HOME | End: 2021-01-18
Attending: OBSTETRICS & GYNECOLOGY | Admitting: OBSTETRICS & GYNECOLOGY
Payer: COMMERCIAL

## 2021-01-18 DIAGNOSIS — Z34.90 ENCOUNTER FOR PRENATAL CARE: ICD-10-CM

## 2021-01-18 PROCEDURE — 76816 OB US FOLLOW-UP PER FETUS: CPT

## 2021-01-28 ENCOUNTER — PRENATAL OFFICE VISIT (OUTPATIENT)
Dept: OBGYN | Facility: CLINIC | Age: 31
End: 2021-01-28
Payer: COMMERCIAL

## 2021-01-28 VITALS
HEART RATE: 103 BPM | DIASTOLIC BLOOD PRESSURE: 63 MMHG | SYSTOLIC BLOOD PRESSURE: 103 MMHG | BODY MASS INDEX: 31.28 KG/M2 | TEMPERATURE: 98.2 F | WEIGHT: 171 LBS

## 2021-01-28 DIAGNOSIS — N76.0 BV (BACTERIAL VAGINOSIS): Primary | ICD-10-CM

## 2021-01-28 DIAGNOSIS — B96.89 BV (BACTERIAL VAGINOSIS): Primary | ICD-10-CM

## 2021-01-28 DIAGNOSIS — R74.01 ELEVATED ALT MEASUREMENT: ICD-10-CM

## 2021-01-28 DIAGNOSIS — Z34.82 PRENATAL CARE, SUBSEQUENT PREGNANCY IN SECOND TRIMESTER: ICD-10-CM

## 2021-01-28 LAB
ALT SERPL W P-5'-P-CCNC: 20 U/L (ref 0–50)
AST SERPL W P-5'-P-CCNC: 13 U/L (ref 0–45)
ERYTHROCYTE [DISTWIDTH] IN BLOOD BY AUTOMATED COUNT: 12.5 % (ref 10–15)
GLUCOSE 1H P 50 G GLC PO SERPL-MCNC: 105 MG/DL (ref 60–129)
HCT VFR BLD AUTO: 35 % (ref 35–47)
HGB BLD-MCNC: 11.9 G/DL (ref 11.7–15.7)
MCH RBC QN AUTO: 31.7 PG (ref 26.5–33)
MCHC RBC AUTO-ENTMCNC: 34 G/DL (ref 31.5–36.5)
MCV RBC AUTO: 93 FL (ref 78–100)
PLATELET # BLD AUTO: 300 10E9/L (ref 150–450)
RBC # BLD AUTO: 3.75 10E12/L (ref 3.8–5.2)
SPECIMEN SOURCE: NORMAL
WBC # BLD AUTO: 14.9 10E9/L (ref 4–11)
WET PREP SPEC: NORMAL

## 2021-01-28 PROCEDURE — 36415 COLL VENOUS BLD VENIPUNCTURE: CPT | Performed by: OBSTETRICS & GYNECOLOGY

## 2021-01-28 PROCEDURE — 84460 ALANINE AMINO (ALT) (SGPT): CPT | Performed by: OBSTETRICS & GYNECOLOGY

## 2021-01-28 PROCEDURE — 86780 TREPONEMA PALLIDUM: CPT | Mod: 90 | Performed by: OBSTETRICS & GYNECOLOGY

## 2021-01-28 PROCEDURE — 84450 TRANSFERASE (AST) (SGOT): CPT | Performed by: OBSTETRICS & GYNECOLOGY

## 2021-01-28 PROCEDURE — 85027 COMPLETE CBC AUTOMATED: CPT | Performed by: OBSTETRICS & GYNECOLOGY

## 2021-01-28 PROCEDURE — 82950 GLUCOSE TEST: CPT | Performed by: OBSTETRICS & GYNECOLOGY

## 2021-01-28 PROCEDURE — 87210 SMEAR WET MOUNT SALINE/INK: CPT | Performed by: OBSTETRICS & GYNECOLOGY

## 2021-01-28 PROCEDURE — 99207 PR PRENATAL VISIT: CPT | Performed by: OBSTETRICS & GYNECOLOGY

## 2021-01-28 PROCEDURE — 99000 SPECIMEN HANDLING OFFICE-LAB: CPT | Performed by: OBSTETRICS & GYNECOLOGY

## 2021-01-28 NOTE — NURSING NOTE
"Initial /63 (BP Location: Left arm, Patient Position: Chair, Cuff Size: Adult Large)   Pulse 103   Temp 98.2  F (36.8  C) (Tympanic)   Wt 77.6 kg (171 lb)   LMP 07/24/2020   Breastfeeding No   BMI 31.28 kg/m   Estimated body mass index is 31.28 kg/m  as calculated from the following:    Height as of 1/7/21: 1.575 m (5' 2\").    Weight as of this encounter: 77.6 kg (171 lb). .    Janette Roberson MA    "

## 2021-01-28 NOTE — PROGRESS NOTES
[facility]     Return OB Note     CC: Return OB      Subjective:  Christianne is a 30 year old  at 26w6d   Denies vaginal bleeding, LOF, or regular contractions. Occasional [place] Woo. Good fetal movement.  Complaints today: None     Objective:  /63 (BP Location: Left arm, Patient Position: Chair, Cuff Size: Adult Large)   Pulse 103   Temp 98.2  F (36.8  C) (Tympanic)   Wt 77.6 kg (171 lb)   LMP 2020   Breastfeeding No   BMI 31.28 kg/m       Fundal height:  26cm  FHT: 159s      Assessment/Plan:    IUP at 26w6d   -History of GHTN: ASA, baseline HELLP labs WNL except elevated ALT. Plan repeat transaminases with mid trimester labs.  -Tobacco use: down to 2 cigarettes per day  -Third tri labs today  -Anatomy US WNL  -Strict return precautions given     RTC 3 weeks    Mary Kate Henao MD   2021 3:30 PM     
Normal for race

## 2021-01-29 LAB — T PALLIDUM AB SER QL: NONREACTIVE

## 2021-03-19 ENCOUNTER — PRENATAL OFFICE VISIT (OUTPATIENT)
Dept: OBGYN | Facility: CLINIC | Age: 31
End: 2021-03-19
Payer: COMMERCIAL

## 2021-03-19 ENCOUNTER — TELEPHONE (OUTPATIENT)
Dept: OBGYN | Facility: CLINIC | Age: 31
End: 2021-03-19

## 2021-03-19 VITALS
SYSTOLIC BLOOD PRESSURE: 123 MMHG | HEART RATE: 114 BPM | WEIGHT: 174.8 LBS | BODY MASS INDEX: 32.17 KG/M2 | DIASTOLIC BLOOD PRESSURE: 70 MMHG | TEMPERATURE: 98.3 F | HEIGHT: 62 IN

## 2021-03-19 DIAGNOSIS — Z34.93 PRENATAL CARE, THIRD TRIMESTER: Primary | ICD-10-CM

## 2021-03-19 PROCEDURE — 99207 PR PRENATAL VISIT: CPT | Performed by: OBSTETRICS & GYNECOLOGY

## 2021-03-19 ASSESSMENT — ANXIETY QUESTIONNAIRES
6. BECOMING EASILY ANNOYED OR IRRITABLE: SEVERAL DAYS
2. NOT BEING ABLE TO STOP OR CONTROL WORRYING: NOT AT ALL
7. FEELING AFRAID AS IF SOMETHING AWFUL MIGHT HAPPEN: NOT AT ALL
IF YOU CHECKED OFF ANY PROBLEMS ON THIS QUESTIONNAIRE, HOW DIFFICULT HAVE THESE PROBLEMS MADE IT FOR YOU TO DO YOUR WORK, TAKE CARE OF THINGS AT HOME, OR GET ALONG WITH OTHER PEOPLE: NOT DIFFICULT AT ALL
1. FEELING NERVOUS, ANXIOUS, OR ON EDGE: NOT AT ALL
GAD7 TOTAL SCORE: 1
3. WORRYING TOO MUCH ABOUT DIFFERENT THINGS: NOT AT ALL
5. BEING SO RESTLESS THAT IT IS HARD TO SIT STILL: NOT AT ALL

## 2021-03-19 ASSESSMENT — MIFFLIN-ST. JEOR: SCORE: 1476.14

## 2021-03-19 ASSESSMENT — PATIENT HEALTH QUESTIONNAIRE - PHQ9
SUM OF ALL RESPONSES TO PHQ QUESTIONS 1-9: 5
5. POOR APPETITE OR OVEREATING: NOT AT ALL

## 2021-03-19 NOTE — TELEPHONE ENCOUNTER
Reason for Call:  Other forms    Detailed comments: Pt dropped of maternity leave paperwork - requesting to start leave 3 weeks prior to due date due to self quarantine from covid.  Mary Kate Henao MD approved.  Pt will  after it has been signed.    Call taken on 3/19/2021 at 2:25 PM by Danni Paez

## 2021-03-19 NOTE — NURSING NOTE
"Initial /70   Pulse 114   Temp 98.3  F (36.8  C) (Tympanic)   Ht 1.575 m (5' 2\")   Wt 79.3 kg (174 lb 12.8 oz)   LMP 07/24/2020   BMI 31.97 kg/m   Estimated body mass index is 31.97 kg/m  as calculated from the following:    Height as of this encounter: 1.575 m (5' 2\").    Weight as of this encounter: 79.3 kg (174 lb 12.8 oz). .      "

## 2021-03-19 NOTE — PROGRESS NOTES
"[facility]     Return OB Note     CC: Return OB      Subjective:  Christianne is a 30 year old  at 34w0d   Denies vaginal bleeding, LOF, or regular contractions. Occasional [place] Woo. Good fetal movement.  Complaints today: None     Objective:  /70   Pulse 114   Temp 98.3  F (36.8  C) (Tympanic)   Ht 1.575 m (5' 2\")   Wt 79.3 kg (174 lb 12.8 oz)   LMP 2020   BMI 31.97 kg/m        Fundal height:  33 cm  FHT 150s     Assessment/Plan:    IUP at 34w0d   -History of GHTN: ASA, baseline HELLP labs WNL except elevated ALT. Plan repeat transaminases with mid trimester labs.  -Tobacco use: down to 2 cigarettes per day, THC+ at new OB, plan UDS in labor  -Third tri labs WNL   -Anatomy US WNL  -Strict return precautions given     RTC 2 weeks     Mary Kate Henao MD   3/19/2021 1:55 PM   "

## 2021-03-20 ASSESSMENT — ANXIETY QUESTIONNAIRES: GAD7 TOTAL SCORE: 1

## 2021-03-22 ENCOUNTER — MYC MEDICAL ADVICE (OUTPATIENT)
Dept: OBGYN | Facility: CLINIC | Age: 31
End: 2021-03-22

## 2021-03-23 NOTE — TELEPHONE ENCOUNTER
Paper work rec'd on 3-19-21 was filled out and put up front for pt to  -pt informed.  Sent to be scanned.  Copy put up front.    -Danni Paez  [facility]

## 2021-04-01 ENCOUNTER — PRENATAL OFFICE VISIT (OUTPATIENT)
Dept: OBGYN | Facility: CLINIC | Age: 31
End: 2021-04-01
Payer: COMMERCIAL

## 2021-04-01 VITALS
BODY MASS INDEX: 31.83 KG/M2 | DIASTOLIC BLOOD PRESSURE: 73 MMHG | TEMPERATURE: 98.5 F | SYSTOLIC BLOOD PRESSURE: 125 MMHG | WEIGHT: 174 LBS | HEART RATE: 98 BPM

## 2021-04-01 DIAGNOSIS — Z34.93 PRENATAL CARE, THIRD TRIMESTER: Primary | ICD-10-CM

## 2021-04-01 PROCEDURE — 99207 PR PRENATAL VISIT: CPT | Performed by: OBSTETRICS & GYNECOLOGY

## 2021-04-01 PROCEDURE — 87653 STREP B DNA AMP PROBE: CPT | Performed by: OBSTETRICS & GYNECOLOGY

## 2021-04-01 NOTE — NURSING NOTE
"Initial /73 (BP Location: Right arm, Patient Position: Chair, Cuff Size: Adult Large)   Pulse 98   Temp 98.5  F (36.9  C) (Tympanic)   Wt 78.9 kg (174 lb)   LMP 07/24/2020   Breastfeeding No   BMI 31.83 kg/m   Estimated body mass index is 31.83 kg/m  as calculated from the following:    Height as of 3/19/21: 1.575 m (5' 2\").    Weight as of this encounter: 78.9 kg (174 lb). .    Janette Roberson MA    "

## 2021-04-01 NOTE — PROGRESS NOTES
[facility]     Return OB Note     CC: Return OB      Subjective:  Christianne is a 30 year old  .at 35w6d   Denies vaginal bleeding, LOF, or regular contractions. Occasional [place] Woo. Good fetal movement.  Complaints today: None     Objective:  /73 (BP Location: Right arm, Patient Position: Chair, Cuff Size: Adult Large)   Pulse 98   Temp 98.5  F (36.9  C) (Tympanic)   Wt 78.9 kg (174 lb)   LMP 2020   Breastfeeding No   BMI 31.83 kg/m       Fundal height:  34 cm  FHT 140s  SVE 1.5/50/-2     Assessment/Plan:    IUP at 35w6d   -History of GHTN: ASA, baseline HELLP labs WNL except elevated ALT. Repeat WNL  -Tobacco use: down to 2 cigarettes per day, THC+ at new OB, plan UDS in labor  -Third tri labs WNL   -Anatomy US WNL  -Strict return precautions given     RTC 1 weeks    Mary Kate Henao MD 2021 2:25 PM

## 2021-04-02 LAB
GP B STREP DNA SPEC QL NAA+PROBE: POSITIVE
SPECIMEN SOURCE: ABNORMAL

## 2021-04-08 ENCOUNTER — PRENATAL OFFICE VISIT (OUTPATIENT)
Dept: OBGYN | Facility: CLINIC | Age: 31
End: 2021-04-08
Payer: COMMERCIAL

## 2021-04-08 VITALS
DIASTOLIC BLOOD PRESSURE: 73 MMHG | BODY MASS INDEX: 32.39 KG/M2 | RESPIRATION RATE: 16 BRPM | SYSTOLIC BLOOD PRESSURE: 120 MMHG | TEMPERATURE: 98 F | HEIGHT: 62 IN | WEIGHT: 176 LBS | HEART RATE: 107 BPM

## 2021-04-08 DIAGNOSIS — Z34.83 ENCOUNTER FOR SUPERVISION OF OTHER NORMAL PREGNANCY IN THIRD TRIMESTER: Primary | ICD-10-CM

## 2021-04-08 PROCEDURE — 99207 PR PRENATAL VISIT: CPT | Performed by: OBSTETRICS & GYNECOLOGY

## 2021-04-08 ASSESSMENT — MIFFLIN-ST. JEOR: SCORE: 1481.58

## 2021-04-08 NOTE — PROGRESS NOTES
Concerns: occasional ctx  .  No vaginal bleeding, LOF, contractions.  No HA, RUQ pain, N/V, visual changes.  Cephalic position confirmed by Leopold maneuvers and cervical exam.  Discussed signs of labor and when to call or come in.  GBS positive  Labor precautions discussed.  RTC 1 week.  Prenatal flowsheet information is reviewed.    Bright Calvo MD

## 2021-04-15 ENCOUNTER — PRENATAL OFFICE VISIT (OUTPATIENT)
Dept: OBGYN | Facility: CLINIC | Age: 31
End: 2021-04-15
Payer: COMMERCIAL

## 2021-04-15 VITALS
HEIGHT: 62 IN | HEART RATE: 123 BPM | BODY MASS INDEX: 32.5 KG/M2 | TEMPERATURE: 98.4 F | WEIGHT: 176.6 LBS | RESPIRATION RATE: 14 BRPM | SYSTOLIC BLOOD PRESSURE: 122 MMHG | DIASTOLIC BLOOD PRESSURE: 68 MMHG

## 2021-04-15 DIAGNOSIS — Z87.59 HISTORY OF GESTATIONAL HYPERTENSION: ICD-10-CM

## 2021-04-15 DIAGNOSIS — O99.820 GBS (GROUP B STREPTOCOCCUS CARRIER), +RV CULTURE, CURRENTLY PREGNANT: ICD-10-CM

## 2021-04-15 DIAGNOSIS — Z34.83 PRENATAL CARE, SUBSEQUENT PREGNANCY IN THIRD TRIMESTER: Primary | ICD-10-CM

## 2021-04-15 PROCEDURE — 99207 PR PRENATAL VISIT: CPT | Performed by: OBSTETRICS & GYNECOLOGY

## 2021-04-15 ASSESSMENT — MIFFLIN-ST. JEOR: SCORE: 1484.3

## 2021-04-15 NOTE — NURSING NOTE
"Initial /68 (BP Location: Right arm, Patient Position: Sitting, Cuff Size: Adult Large)   Pulse 123   Temp 98.4  F (36.9  C) (Tympanic)   Resp 14   Ht 1.575 m (5' 2\")   Wt 80.1 kg (176 lb 9.6 oz)   LMP 07/24/2020   BMI 32.30 kg/m   Estimated body mass index is 32.3 kg/m  as calculated from the following:    Height as of this encounter: 1.575 m (5' 2\").    Weight as of this encounter: 80.1 kg (176 lb 9.6 oz). .      "

## 2021-04-15 NOTE — PROGRESS NOTES
"[facility]    Return OB Note    CC: Return OB     Subjective:  Christianne is a 30 year old  at 37w6d   Denies vaginal bleeding, LOF, or regular contractions. Good fetal movement.  Complaints today: Some mucous discharge today.    Objective:  /68 (BP Location: Right arm, Patient Position: Sitting, Cuff Size: Adult Large)   Pulse 123   Temp 98.4  F (36.9  C) (Tympanic)   Resp 14   Ht 1.575 m (5' 2\")   Wt 80.1 kg (176 lb 9.6 oz)   LMP 2020   BMI 32.30 kg/m      Fundal height: 36cm  FHT: 130bpm  SVE: /-2    Assessment/Plan:   Encounter Diagnoses   Name Primary?     Prenatal care, subsequent pregnancy in third trimester Yes     GBS (group B Streptococcus carrier), +RV culture, currently pregnant      History of gestational hypertension        IUP at 37w6d  -GBS positive: abx in labor  -Possibly interested in elective IOL  -History of GHTN: ASA, baseline HELLP labs WNL except elevated ALT. Repeat WNL  -Tobacco use: down to 2 cigarettes per day, THC+ at new OB, plan UDS in labor  Strict return precautions given    RTC 1 weeks    Shanice Smith DO    "

## 2021-04-16 ENCOUNTER — MYC MEDICAL ADVICE (OUTPATIENT)
Dept: OBGYN | Facility: CLINIC | Age: 31
End: 2021-04-16

## 2021-04-17 ENCOUNTER — HOSPITAL ENCOUNTER (OUTPATIENT)
Facility: CLINIC | Age: 31
Discharge: HOME | End: 2021-04-17
Attending: OBSTETRICS & GYNECOLOGY | Admitting: OBSTETRICS & GYNECOLOGY
Payer: COMMERCIAL

## 2021-04-17 VITALS
SYSTOLIC BLOOD PRESSURE: 117 MMHG | DIASTOLIC BLOOD PRESSURE: 63 MMHG | TEMPERATURE: 98.7 F | RESPIRATION RATE: 18 BRPM | HEART RATE: 118 BPM

## 2021-04-17 PROBLEM — Z34.80 SUPERVISION OF OTHER NORMAL PREGNANCY: Status: ACTIVE | Noted: 2021-04-17

## 2021-04-17 LAB
RUPTURE OF FETAL MEMBRANES BY ROM PLUS: NEGATIVE
RUPTURE OF FETAL MEMBRANES BY ROM PLUS: NEGATIVE

## 2021-04-17 PROCEDURE — 59025 FETAL NON-STRESS TEST: CPT

## 2021-04-17 PROCEDURE — 80307 DRUG TEST PRSMV CHEM ANLYZR: CPT | Performed by: OBSTETRICS & GYNECOLOGY

## 2021-04-17 PROCEDURE — 84112 EVAL AMNIOTIC FLUID PROTEIN: CPT | Performed by: OBSTETRICS & GYNECOLOGY

## 2021-04-17 PROCEDURE — 59025 FETAL NON-STRESS TEST: CPT | Mod: 26 | Performed by: OBSTETRICS & GYNECOLOGY

## 2021-04-17 PROCEDURE — G0463 HOSPITAL OUTPT CLINIC VISIT: HCPCS | Mod: 25

## 2021-04-17 RX ORDER — ONDANSETRON 2 MG/ML
4 INJECTION INTRAMUSCULAR; INTRAVENOUS EVERY 6 HOURS PRN
Status: DISCONTINUED | OUTPATIENT
Start: 2021-04-17 | End: 2021-04-18 | Stop reason: HOSPADM

## 2021-04-18 ENCOUNTER — ANESTHESIA (OUTPATIENT)
Dept: OBGYN | Facility: CLINIC | Age: 31
End: 2021-04-18
Payer: COMMERCIAL

## 2021-04-18 ENCOUNTER — ANESTHESIA EVENT (OUTPATIENT)
Dept: OBGYN | Facility: CLINIC | Age: 31
End: 2021-04-18
Payer: COMMERCIAL

## 2021-04-18 ENCOUNTER — HOSPITAL ENCOUNTER (INPATIENT)
Facility: CLINIC | Age: 31
LOS: 2 days | Discharge: HOME | End: 2021-04-20
Attending: OBSTETRICS & GYNECOLOGY | Admitting: OBSTETRICS & GYNECOLOGY
Payer: COMMERCIAL

## 2021-04-18 LAB
ABO + RH BLD: NORMAL
ABO + RH BLD: NORMAL
ALBUMIN SERPL-MCNC: 2.3 G/DL (ref 3.4–5)
ALBUMIN UR-MCNC: NEGATIVE MG/DL
ALP SERPL-CCNC: 154 U/L (ref 40–150)
ALT SERPL W P-5'-P-CCNC: 17 U/L (ref 0–50)
AMPHETAMINES UR QL SCN: NEGATIVE
ANION GAP SERPL CALCULATED.3IONS-SCNC: 8 MMOL/L (ref 3–14)
APPEARANCE UR: CLEAR
AST SERPL W P-5'-P-CCNC: 13 U/L (ref 0–45)
BARBITURATES UR QL: NEGATIVE
BASOPHILS # BLD AUTO: 0.1 10E9/L (ref 0–0.2)
BASOPHILS NFR BLD AUTO: 0.2 %
BENZODIAZ UR QL: NEGATIVE
BILIRUB SERPL-MCNC: 0.6 MG/DL (ref 0.2–1.3)
BILIRUB UR QL STRIP: NEGATIVE
BLD GP AB SCN SERPL QL: NORMAL
BLOOD BANK CMNT PATIENT-IMP: NORMAL
BUN SERPL-MCNC: 5 MG/DL (ref 7–30)
CALCIUM SERPL-MCNC: 7.6 MG/DL (ref 8.5–10.1)
CANNABINOIDS UR QL SCN: NEGATIVE
CHLORIDE SERPL-SCNC: 107 MMOL/L (ref 94–109)
CO2 SERPL-SCNC: 20 MMOL/L (ref 20–32)
COCAINE UR QL: NEGATIVE
COLOR UR AUTO: YELLOW
CREAT SERPL-MCNC: 0.57 MG/DL (ref 0.52–1.04)
DIFFERENTIAL METHOD BLD: ABNORMAL
EOSINOPHIL # BLD AUTO: 0.2 10E9/L (ref 0–0.7)
EOSINOPHIL NFR BLD AUTO: 0.8 %
ERYTHROCYTE [DISTWIDTH] IN BLOOD BY AUTOMATED COUNT: 12.5 % (ref 10–15)
GFR SERPL CREATININE-BSD FRML MDRD: >90 ML/MIN/{1.73_M2}
GLUCOSE SERPL-MCNC: 94 MG/DL (ref 70–99)
GLUCOSE UR STRIP-MCNC: NEGATIVE MG/DL
HCT VFR BLD AUTO: 35 % (ref 35–47)
HGB BLD-MCNC: 12.1 G/DL (ref 11.7–15.7)
HGB UR QL STRIP: ABNORMAL
IMM GRANULOCYTES # BLD: 0.2 10E9/L (ref 0–0.4)
IMM GRANULOCYTES NFR BLD: 0.8 %
KETONES UR STRIP-MCNC: NEGATIVE MG/DL
LABORATORY COMMENT REPORT: NORMAL
LACTATE BLD-SCNC: 1.5 MMOL/L (ref 0.7–2)
LEUKOCYTE ESTERASE UR QL STRIP: NEGATIVE
LYMPHOCYTES # BLD AUTO: 3.7 10E9/L (ref 0.8–5.3)
LYMPHOCYTES NFR BLD AUTO: 17.4 %
MCH RBC QN AUTO: 31.3 PG (ref 26.5–33)
MCHC RBC AUTO-ENTMCNC: 34.6 G/DL (ref 31.5–36.5)
MCV RBC AUTO: 90 FL (ref 78–100)
MONOCYTES # BLD AUTO: 1 10E9/L (ref 0–1.3)
MONOCYTES NFR BLD AUTO: 4.8 %
MUCOUS THREADS #/AREA URNS LPF: PRESENT /LPF
NEUTROPHILS # BLD AUTO: 16.1 10E9/L (ref 1.6–8.3)
NEUTROPHILS NFR BLD AUTO: 76 %
NITRATE UR QL: NEGATIVE
NRBC # BLD AUTO: 0 10*3/UL
NRBC BLD AUTO-RTO: 0 /100
OPIATES UR QL SCN: NEGATIVE
PCP UR QL SCN: NEGATIVE
PH UR STRIP: 7 PH (ref 5–7)
PLATELET # BLD AUTO: 302 10E9/L (ref 150–450)
POTASSIUM SERPL-SCNC: 3.6 MMOL/L (ref 3.4–5.3)
PROT SERPL-MCNC: 6.3 G/DL (ref 6.8–8.8)
RBC # BLD AUTO: 3.87 10E12/L (ref 3.8–5.2)
RBC #/AREA URNS AUTO: 68 /HPF (ref 0–2)
SARS-COV-2 RNA RESP QL NAA+PROBE: NEGATIVE
SODIUM SERPL-SCNC: 135 MMOL/L (ref 133–144)
SOURCE: ABNORMAL
SP GR UR STRIP: 1.02 (ref 1–1.03)
SPECIMEN EXP DATE BLD: NORMAL
SPECIMEN SOURCE: NORMAL
SQUAMOUS #/AREA URNS AUTO: <1 /HPF (ref 0–1)
T PALLIDUM AB SER QL: NONREACTIVE
UROBILINOGEN UR STRIP-MCNC: 0 MG/DL (ref 0–2)
WBC # BLD AUTO: 21.3 10E9/L (ref 4–11)
WBC #/AREA URNS AUTO: 1 /HPF (ref 0–5)

## 2021-04-18 PROCEDURE — 80053 COMPREHEN METABOLIC PANEL: CPT | Performed by: OBSTETRICS & GYNECOLOGY

## 2021-04-18 PROCEDURE — 250N000011 HC RX IP 250 OP 636: Performed by: NURSE ANESTHETIST, CERTIFIED REGISTERED

## 2021-04-18 PROCEDURE — 86900 BLOOD TYPING SEROLOGIC ABO: CPT | Performed by: OBSTETRICS & GYNECOLOGY

## 2021-04-18 PROCEDURE — 258N000003 HC RX IP 258 OP 636: Performed by: OBSTETRICS & GYNECOLOGY

## 2021-04-18 PROCEDURE — 250N000009 HC RX 250

## 2021-04-18 PROCEDURE — 258N000003 HC RX IP 258 OP 636: Performed by: NURSE ANESTHETIST, CERTIFIED REGISTERED

## 2021-04-18 PROCEDURE — 85025 COMPLETE CBC W/AUTO DIFF WBC: CPT | Performed by: OBSTETRICS & GYNECOLOGY

## 2021-04-18 PROCEDURE — 87040 BLOOD CULTURE FOR BACTERIA: CPT | Performed by: OBSTETRICS & GYNECOLOGY

## 2021-04-18 PROCEDURE — 86901 BLOOD TYPING SEROLOGIC RH(D): CPT | Performed by: OBSTETRICS & GYNECOLOGY

## 2021-04-18 PROCEDURE — 83605 ASSAY OF LACTIC ACID: CPT | Performed by: OBSTETRICS & GYNECOLOGY

## 2021-04-18 PROCEDURE — 87635 SARS-COV-2 COVID-19 AMP PRB: CPT | Performed by: OBSTETRICS & GYNECOLOGY

## 2021-04-18 PROCEDURE — 250N000009 HC RX 250: Performed by: OBSTETRICS & GYNECOLOGY

## 2021-04-18 PROCEDURE — 86780 TREPONEMA PALLIDUM: CPT | Performed by: OBSTETRICS & GYNECOLOGY

## 2021-04-18 PROCEDURE — 250N000009 HC RX 250: Performed by: NURSE ANESTHETIST, CERTIFIED REGISTERED

## 2021-04-18 PROCEDURE — 120N000001 HC R&B MED SURG/OB

## 2021-04-18 PROCEDURE — 370N000003 HC ANESTHESIA WARD SERVICE: Performed by: NURSE ANESTHETIST, CERTIFIED REGISTERED

## 2021-04-18 PROCEDURE — 250N000011 HC RX IP 250 OP 636: Performed by: OBSTETRICS & GYNECOLOGY

## 2021-04-18 PROCEDURE — 86850 RBC ANTIBODY SCREEN: CPT | Performed by: OBSTETRICS & GYNECOLOGY

## 2021-04-18 PROCEDURE — 250N000013 HC RX MED GY IP 250 OP 250 PS 637: Performed by: OBSTETRICS & GYNECOLOGY

## 2021-04-18 PROCEDURE — 36415 COLL VENOUS BLD VENIPUNCTURE: CPT | Performed by: OBSTETRICS & GYNECOLOGY

## 2021-04-18 PROCEDURE — 59400 OBSTETRICAL CARE: CPT | Performed by: OBSTETRICS & GYNECOLOGY

## 2021-04-18 PROCEDURE — 81001 URINALYSIS AUTO W/SCOPE: CPT | Performed by: OBSTETRICS & GYNECOLOGY

## 2021-04-18 PROCEDURE — 722N000001 HC LABOR CARE VAGINAL DELIVERY SINGLE

## 2021-04-18 PROCEDURE — 999N000011 HC STATISTIC ANESTHESIA CASE

## 2021-04-18 RX ORDER — SODIUM CHLORIDE, SODIUM LACTATE, POTASSIUM CHLORIDE, CALCIUM CHLORIDE 600; 310; 30; 20 MG/100ML; MG/100ML; MG/100ML; MG/100ML
INJECTION, SOLUTION INTRAVENOUS CONTINUOUS
Status: DISCONTINUED | OUTPATIENT
Start: 2021-04-18 | End: 2021-04-20 | Stop reason: HOSPADM

## 2021-04-18 RX ORDER — ONDANSETRON 2 MG/ML
4 INJECTION INTRAMUSCULAR; INTRAVENOUS EVERY 6 HOURS PRN
Status: DISCONTINUED | OUTPATIENT
Start: 2021-04-18 | End: 2021-04-18

## 2021-04-18 RX ORDER — LIDOCAINE HYDROCHLORIDE 10 MG/ML
INJECTION, SOLUTION INFILTRATION; PERINEURAL PRN
Status: DISCONTINUED | OUTPATIENT
Start: 2021-04-18 | End: 2021-04-18

## 2021-04-18 RX ORDER — NALOXONE HYDROCHLORIDE 0.4 MG/ML
0.2 INJECTION, SOLUTION INTRAMUSCULAR; INTRAVENOUS; SUBCUTANEOUS
Status: DISCONTINUED | OUTPATIENT
Start: 2021-04-18 | End: 2021-04-18

## 2021-04-18 RX ORDER — FENTANYL CITRATE 50 UG/ML
INJECTION, SOLUTION INTRAMUSCULAR; INTRAVENOUS PRN
Status: DISCONTINUED | OUTPATIENT
Start: 2021-04-18 | End: 2021-04-18

## 2021-04-18 RX ORDER — OXYTOCIN 10 [USP'U]/ML
10 INJECTION, SOLUTION INTRAMUSCULAR; INTRAVENOUS
Status: DISCONTINUED | OUTPATIENT
Start: 2021-04-18 | End: 2021-04-18

## 2021-04-18 RX ORDER — LIDOCAINE HYDROCHLORIDE 10 MG/ML
INJECTION, SOLUTION EPIDURAL; INFILTRATION; INTRACAUDAL; PERINEURAL
Status: COMPLETED
Start: 2021-04-18 | End: 2021-04-18

## 2021-04-18 RX ORDER — LIDOCAINE HYDROCHLORIDE AND EPINEPHRINE 15; 5 MG/ML; UG/ML
INJECTION, SOLUTION EPIDURAL PRN
Status: DISCONTINUED | OUTPATIENT
Start: 2021-04-18 | End: 2021-04-18

## 2021-04-18 RX ORDER — LIDOCAINE 40 MG/G
CREAM TOPICAL
Status: DISCONTINUED | OUTPATIENT
Start: 2021-04-18 | End: 2021-04-18

## 2021-04-18 RX ORDER — SODIUM CHLORIDE, SODIUM LACTATE, POTASSIUM CHLORIDE, CALCIUM CHLORIDE 600; 310; 30; 20 MG/100ML; MG/100ML; MG/100ML; MG/100ML
INJECTION, SOLUTION INTRAVENOUS CONTINUOUS
Status: DISCONTINUED | OUTPATIENT
Start: 2021-04-18 | End: 2021-04-18

## 2021-04-18 RX ORDER — BISACODYL 10 MG
10 SUPPOSITORY, RECTAL RECTAL DAILY PRN
Status: DISCONTINUED | OUTPATIENT
Start: 2021-04-20 | End: 2021-04-20 | Stop reason: HOSPADM

## 2021-04-18 RX ORDER — HYDROCORTISONE 2.5 %
CREAM (GRAM) TOPICAL 3 TIMES DAILY PRN
Status: DISCONTINUED | OUTPATIENT
Start: 2021-04-18 | End: 2021-04-20 | Stop reason: HOSPADM

## 2021-04-18 RX ORDER — FENTANYL CITRATE 50 UG/ML
INJECTION, SOLUTION INTRAMUSCULAR; INTRAVENOUS
Status: COMPLETED
Start: 2021-04-18 | End: 2021-04-18

## 2021-04-18 RX ORDER — CARBOPROST TROMETHAMINE 250 UG/ML
250 INJECTION, SOLUTION INTRAMUSCULAR
Status: DISCONTINUED | OUTPATIENT
Start: 2021-04-18 | End: 2021-04-18

## 2021-04-18 RX ORDER — EPHEDRINE SULFATE 50 MG/ML
5 INJECTION, SOLUTION INTRAMUSCULAR; INTRAVENOUS; SUBCUTANEOUS
Status: DISCONTINUED | OUTPATIENT
Start: 2021-04-18 | End: 2021-04-18

## 2021-04-18 RX ORDER — METHYLERGONOVINE MALEATE 0.2 MG/ML
200 INJECTION INTRAVENOUS
Status: DISCONTINUED | OUTPATIENT
Start: 2021-04-18 | End: 2021-04-18

## 2021-04-18 RX ORDER — ONDANSETRON 4 MG/1
4 TABLET, ORALLY DISINTEGRATING ORAL EVERY 6 HOURS PRN
Status: DISCONTINUED | OUTPATIENT
Start: 2021-04-18 | End: 2021-04-18

## 2021-04-18 RX ORDER — MODIFIED LANOLIN
OINTMENT (GRAM) TOPICAL
Status: DISCONTINUED | OUTPATIENT
Start: 2021-04-18 | End: 2021-04-20 | Stop reason: HOSPADM

## 2021-04-18 RX ORDER — TRANEXAMIC ACID 10 MG/ML
1 INJECTION, SOLUTION INTRAVENOUS EVERY 30 MIN PRN
Status: DISCONTINUED | OUTPATIENT
Start: 2021-04-18 | End: 2021-04-18

## 2021-04-18 RX ORDER — OXYTOCIN/0.9 % SODIUM CHLORIDE 30/500 ML
100-340 PLASTIC BAG, INJECTION (ML) INTRAVENOUS CONTINUOUS PRN
Status: COMPLETED | OUTPATIENT
Start: 2021-04-18 | End: 2021-04-18

## 2021-04-18 RX ORDER — OXYTOCIN 10 [USP'U]/ML
10 INJECTION, SOLUTION INTRAMUSCULAR; INTRAVENOUS
Status: DISCONTINUED | OUTPATIENT
Start: 2021-04-18 | End: 2021-04-20 | Stop reason: HOSPADM

## 2021-04-18 RX ORDER — AMOXICILLIN 250 MG
1 CAPSULE ORAL 2 TIMES DAILY
Status: DISCONTINUED | OUTPATIENT
Start: 2021-04-18 | End: 2021-04-20 | Stop reason: HOSPADM

## 2021-04-18 RX ORDER — ACETAMINOPHEN 325 MG/1
650 TABLET ORAL EVERY 4 HOURS PRN
Status: DISCONTINUED | OUTPATIENT
Start: 2021-04-18 | End: 2021-04-18

## 2021-04-18 RX ORDER — OXYTOCIN/0.9 % SODIUM CHLORIDE 30/500 ML
1-24 PLASTIC BAG, INJECTION (ML) INTRAVENOUS CONTINUOUS
Status: DISCONTINUED | OUTPATIENT
Start: 2021-04-18 | End: 2021-04-18

## 2021-04-18 RX ORDER — ACETAMINOPHEN 325 MG/1
650 TABLET ORAL EVERY 4 HOURS PRN
Status: DISCONTINUED | OUTPATIENT
Start: 2021-04-18 | End: 2021-04-20 | Stop reason: HOSPADM

## 2021-04-18 RX ORDER — DIPHENHYDRAMINE HCL 25 MG
25 CAPSULE ORAL EVERY 6 HOURS PRN
Status: DISCONTINUED | OUTPATIENT
Start: 2021-04-18 | End: 2021-04-18

## 2021-04-18 RX ORDER — DIPHENHYDRAMINE HYDROCHLORIDE 50 MG/ML
25 INJECTION INTRAMUSCULAR; INTRAVENOUS EVERY 6 HOURS PRN
Status: DISCONTINUED | OUTPATIENT
Start: 2021-04-18 | End: 2021-04-18

## 2021-04-18 RX ORDER — IBUPROFEN 800 MG/1
800 TABLET, FILM COATED ORAL EVERY 6 HOURS PRN
Status: DISCONTINUED | OUTPATIENT
Start: 2021-04-18 | End: 2021-04-20 | Stop reason: HOSPADM

## 2021-04-18 RX ORDER — TRANEXAMIC ACID 10 MG/ML
1 INJECTION, SOLUTION INTRAVENOUS EVERY 30 MIN PRN
Status: DISCONTINUED | OUTPATIENT
Start: 2021-04-18 | End: 2021-04-20 | Stop reason: HOSPADM

## 2021-04-18 RX ORDER — AMOXICILLIN 250 MG
2 CAPSULE ORAL 2 TIMES DAILY
Status: DISCONTINUED | OUTPATIENT
Start: 2021-04-18 | End: 2021-04-20 | Stop reason: HOSPADM

## 2021-04-18 RX ORDER — NALOXONE HYDROCHLORIDE 0.4 MG/ML
0.4 INJECTION, SOLUTION INTRAMUSCULAR; INTRAVENOUS; SUBCUTANEOUS
Status: DISCONTINUED | OUTPATIENT
Start: 2021-04-18 | End: 2021-04-18

## 2021-04-18 RX ORDER — OXYTOCIN/0.9 % SODIUM CHLORIDE 30/500 ML
340 PLASTIC BAG, INJECTION (ML) INTRAVENOUS CONTINUOUS PRN
Status: DISCONTINUED | OUTPATIENT
Start: 2021-04-18 | End: 2021-04-20 | Stop reason: HOSPADM

## 2021-04-18 RX ORDER — IBUPROFEN 800 MG/1
800 TABLET, FILM COATED ORAL
Status: DISCONTINUED | OUTPATIENT
Start: 2021-04-18 | End: 2021-04-18

## 2021-04-18 RX ORDER — BUPIVACAINE HYDROCHLORIDE 2.5 MG/ML
INJECTION, SOLUTION EPIDURAL; INFILTRATION; INTRACAUDAL
Status: COMPLETED
Start: 2021-04-18 | End: 2021-04-18

## 2021-04-18 RX ORDER — OXYTOCIN/0.9 % SODIUM CHLORIDE 30/500 ML
100 PLASTIC BAG, INJECTION (ML) INTRAVENOUS CONTINUOUS
Status: DISCONTINUED | OUTPATIENT
Start: 2021-04-18 | End: 2021-04-20 | Stop reason: HOSPADM

## 2021-04-18 RX ORDER — CLINDAMYCIN PHOSPHATE 900 MG/50ML
900 INJECTION, SOLUTION INTRAVENOUS EVERY 8 HOURS
Status: DISCONTINUED | OUTPATIENT
Start: 2021-04-18 | End: 2021-04-19

## 2021-04-18 RX ORDER — BUPIVACAINE HYDROCHLORIDE 2.5 MG/ML
INJECTION, SOLUTION EPIDURAL; INFILTRATION; INTRACAUDAL PRN
Status: DISCONTINUED | OUTPATIENT
Start: 2021-04-18 | End: 2021-04-18

## 2021-04-18 RX ORDER — PRENATAL VIT/IRON FUM/FOLIC AC 27MG-0.8MG
1 TABLET ORAL DAILY
Status: DISCONTINUED | OUTPATIENT
Start: 2021-04-18 | End: 2021-04-20 | Stop reason: HOSPADM

## 2021-04-18 RX ORDER — EPHEDRINE SULFATE 50 MG/ML
INJECTION, SOLUTION INTRAMUSCULAR; INTRAVENOUS; SUBCUTANEOUS
Status: COMPLETED
Start: 2021-04-18 | End: 2021-04-18

## 2021-04-18 RX ORDER — OXYCODONE AND ACETAMINOPHEN 5; 325 MG/1; MG/1
1 TABLET ORAL
Status: DISCONTINUED | OUTPATIENT
Start: 2021-04-18 | End: 2021-04-18

## 2021-04-18 RX ORDER — PENICILLIN G POTASSIUM 5000000 [IU]/1
5 INJECTION, POWDER, FOR SOLUTION INTRAMUSCULAR; INTRAVENOUS ONCE
Status: COMPLETED | OUTPATIENT
Start: 2021-04-18 | End: 2021-04-18

## 2021-04-18 RX ADMIN — GENTAMICIN SULFATE 160 MG: 40 INJECTION, SOLUTION INTRAMUSCULAR; INTRAVENOUS at 12:50

## 2021-04-18 RX ADMIN — CLINDAMYCIN PHOSPHATE 900 MG: 900 INJECTION, SOLUTION INTRAVENOUS at 11:39

## 2021-04-18 RX ADMIN — Medication 10 ML/HR: at 04:37

## 2021-04-18 RX ADMIN — SODIUM CHLORIDE 3 MILLION UNITS: 9 INJECTION, SOLUTION INTRAVENOUS at 14:17

## 2021-04-18 RX ADMIN — Medication 5 MG: at 05:34

## 2021-04-18 RX ADMIN — FENTANYL CITRATE 100 MCG: 50 INJECTION, SOLUTION INTRAMUSCULAR; INTRAVENOUS at 04:34

## 2021-04-18 RX ADMIN — PENICILLIN G POTASSIUM 5 MILLION UNITS: 5000000 POWDER, FOR SOLUTION INTRAMUSCULAR; INTRAPLEURAL; INTRATHECAL; INTRAVENOUS at 04:00

## 2021-04-18 RX ADMIN — ACETAMINOPHEN 650 MG: 325 TABLET, FILM COATED ORAL at 11:23

## 2021-04-18 RX ADMIN — Medication 2 MILLI-UNITS/MIN: at 08:38

## 2021-04-18 RX ADMIN — SODIUM CHLORIDE, POTASSIUM CHLORIDE, SODIUM LACTATE AND CALCIUM CHLORIDE 1000 ML: 600; 310; 30; 20 INJECTION, SOLUTION INTRAVENOUS at 03:45

## 2021-04-18 RX ADMIN — LIDOCAINE HYDROCHLORIDE AND EPINEPHRINE 75 MG: 15; 5 INJECTION, SOLUTION EPIDURAL at 04:31

## 2021-04-18 RX ADMIN — BUPIVACAINE HYDROCHLORIDE 5 ML: 2.5 INJECTION, SOLUTION EPIDURAL; INFILTRATION; INTRACAUDAL at 04:34

## 2021-04-18 RX ADMIN — GENTAMICIN SULFATE 140 MG: 40 INJECTION, SOLUTION INTRAMUSCULAR; INTRAVENOUS at 21:13

## 2021-04-18 RX ADMIN — Medication 100 ML/HR: at 11:21

## 2021-04-18 RX ADMIN — SODIUM CHLORIDE 3 MILLION UNITS: 9 INJECTION, SOLUTION INTRAVENOUS at 18:09

## 2021-04-18 RX ADMIN — PRENATAL VITAMINS-IRON FUMARATE 27 MG IRON-FOLIC ACID 0.8 MG TABLET 1 TABLET: at 11:48

## 2021-04-18 RX ADMIN — Medication 340 ML/HR: at 10:50

## 2021-04-18 RX ADMIN — EPHEDRINE SULFATE 5 MG: 50 INJECTION, SOLUTION INTRAMUSCULAR; INTRAVENOUS; SUBCUTANEOUS at 05:34

## 2021-04-18 RX ADMIN — SODIUM CHLORIDE 2.5 MILLION UNITS: 9 INJECTION, SOLUTION INTRAVENOUS at 08:03

## 2021-04-18 RX ADMIN — SODIUM CHLORIDE 3 MILLION UNITS: 9 INJECTION, SOLUTION INTRAVENOUS at 22:27

## 2021-04-18 RX ADMIN — LIDOCAINE HYDROCHLORIDE 80 MG: 10 INJECTION, SOLUTION INFILTRATION; PERINEURAL at 04:20

## 2021-04-18 RX ADMIN — BUPIVACAINE HYDROCHLORIDE 5 ML: 2.5 INJECTION, SOLUTION EPIDURAL; INFILTRATION; INTRACAUDAL at 04:40

## 2021-04-18 RX ADMIN — SODIUM CHLORIDE, POTASSIUM CHLORIDE, SODIUM LACTATE AND CALCIUM CHLORIDE: 600; 310; 30; 20 INJECTION, SOLUTION INTRAVENOUS at 04:50

## 2021-04-18 RX ADMIN — ACETAMINOPHEN 650 MG: 325 TABLET, FILM COATED ORAL at 20:32

## 2021-04-18 RX ADMIN — Medication 5 ML: at 04:45

## 2021-04-18 RX ADMIN — Medication: at 04:36

## 2021-04-18 RX ADMIN — SODIUM CHLORIDE, POTASSIUM CHLORIDE, SODIUM LACTATE AND CALCIUM CHLORIDE 250 ML: 600; 310; 30; 20 INJECTION, SOLUTION INTRAVENOUS at 10:35

## 2021-04-18 RX ADMIN — IBUPROFEN 800 MG: 800 TABLET, FILM COATED ORAL at 18:23

## 2021-04-18 RX ADMIN — CLINDAMYCIN PHOSPHATE 900 MG: 900 INJECTION, SOLUTION INTRAVENOUS at 19:39

## 2021-04-18 ASSESSMENT — LIFESTYLE VARIABLES: TOBACCO_USE: 1

## 2021-04-18 NOTE — ANESTHESIA PROCEDURE NOTES
Epidural catheter Procedure Note  Pre-Procedure   Staff -        CRNA: Rosanne Duran APRN CRNA       Performed By: CRNA       Location: OB       Procedure Start/Stop Times: 4/18/2021 4:16 AM and 4/18/2021 4:25 AM       Pre-Anesthestic Checklist: patient identified, IV checked, risks and benefits discussed, informed consent, monitors and equipment checked, pre-op evaluation and at physician/surgeon's request  Timeout:       Correct Patient: Yes        Correct Procedure: Yes        Correct Site: Yes        Correct Position: Yes   Procedure Documentation  Procedure: epidural catheter       Diagnosis: Pain       Patient Position: sitting       Patient Prep/Sterile Barriers: sterile gloves, mask, patient draped       Skin prep: DuraPrep       Local skin infiltrated with 8 mL of 1% lidocaine.        Insertion Site: L3-4. (midline approach).       Technique: LORT saline        RIC at 7 cm.       Needle Type: ToGoyaka Incy needle       Needle Gauge: 17.        Needle Length (Inches): 3.5        Catheter: 19 G.         Catheter threaded easily.         4 cm epidural space.         Threaded 12 cm at skin.        # of attempts: 1 and  # of redirects:     Assessment/Narrative         Paresthesias: No.       Test dose of 5 mL lidocaine 1.5% w/ 1:200,000 epinephrine at 04:31.         Test dose negative, 3 minutes after injection, for signs of intravascular, subdural, or intrathecal injection.       Insertion/Infusion Method: LORT saline       Aspiration negative for Heme or CSF via Epidural Catheter.    Comments:  VAS pain score prior to epidural:7/10    VAS pain score after epidural:1/10    Pt. Tolerated well, FHR stable.

## 2021-04-18 NOTE — PLAN OF CARE
S: Discharge from triage. 2nd ROM+ negative. Scant amount of discharge/fluid noted on chux. Reassured patient that increased amounts of clear discharge is normal at this point in her pregnancy.   A: moderate variablility, + accels, no decels, Category I  normal uterine activity  Strip reviewed by Charis Thomas RN.    Admission on 04/17/2021   Component Date Value     Rupture of Fetal Membran* 04/17/2021 Negative      Rupture of Fetal Membran* 04/17/2021 Negative      Dr. LULÚ Bradley informed of above and discharge order received.   R: Plan includes: Labor instuctions given Patient instructed to report any recurrence of above concerns to her primary care provider during clinic hours or [facility] at any other time. Patient verbalized understanding of After Visit Summary, education and agreement with plan. Agrees to call for any problems, questions or concerns.  Discharged undelivered via ambulatory  in stable condition with all belongings. Accompanied by .

## 2021-04-18 NOTE — PROGRESS NOTES
S:Delivery  B:Augmented  Labor,38w2d    Lab Results   Component Value Date    GBS Positive (A) 2021    with antibiotic treatment greater than or equal to 4 hours prior to delivery.  A: Patient delivered   none, intact at 1048 with Dr. LULÚ Bradley in attendance and baby placed on mother's abdomen for delayed cord clamping. Baby dried and stimulated. Baby placed  skin to skin @ 1048.. Apgars 8/9.Delivery QBL 25.  IV infusion of Oxytocin  infused. Placenta removal spontaneous. MD does not want placenta sent to pathology.  See Flowsheet for VS and PP checks. Delivery QBL (mL): 25 mL.  Labor care plan goals met, transition now to postpartum care. Postpartum [id number]  R: Expect routine postpartum care. Anticipate first feeding within the hour or whenever infant displays feeding cues. Continue skin to skin. Prior discussion with mother indicates that feeding plan is Breast feeding . Educated mother on importance of exclusive breastfeeding, expected feeding readiness cues and encouraged her to observe for these cues while rooming in. Informed her that breastfeeding assistance would be provided.  Pt had a fever following the delivery.  MD ordered labs and IV antibiotics.

## 2021-04-18 NOTE — PROGRESS NOTES
Pt's temp is 102.4.  Pt was afebrile right before delivery.  Tylenol given and Dr. Bradley was notified.

## 2021-04-18 NOTE — L&D DELIVERY NOTE
Delivery Summary    Christianne Ferrell MRN# [id number]   Age: 30 year old YOB: 1990     ASSESSMENT & PLAN: 30 year old  presented @ 38w2d to BC for active labor management.     Stage 1: patient presented @ 3-4/70-80/-1 and SROM.  Epidural for analgesia.  Labor progress stalled @ 5 cm and pitocin augmentation initiated.    Stage 2: Pushed twice.  Fetal tachycardia noted at time of delivery.      of viable male, 6#7, Apgars 8/9  Placenta with 3vc  Lacerations: skin abrasions but no suture needed  Mother and infant stable.    Patient spiked a temp to 102.4.  Will start on gent/clinda for presumed chorio/endometritis.  No other localizing signs or symptoms.     Shawna Bradley M.D.          Ghassan, Liliana-Christianne [[id number]]    Labor Event Times    Labor onset date: 21 Onset time: 12:00 AM   Dilation complete date: 21 Complete time: 10:31 AM   Start pushing date/time: 2021 1043      Labor Events     labor?: No   steroids: None  Labor Type: Spontaneous, Augmentation  Predominate monitoring during 1st stage: continuous electronic fetal monitoring     Antibiotics received during labor?: Yes  Reason for Antibiotics: GBS  Antibiotics received for GBS: Penicillin  Antibiotics Given (GBS): Greater than 4 hours prior to delivery     Rupture date/time: 21 0335   Rupture type: Spontaneous rupture of membranes occuring during spontaneous labor or augmentation  Fluid color: Clear  Fluid odor: Normal     Augmentation: Oxytocin  Augmentation date/time: 21 0838   Indications for augmentation: Ineffective Contraction Pattern  1:1 continuous labor support provided by?: RN Labor partogram used?: no      Delivery/Placenta Date and Time    Delivery Date: 21 Delivery Time: 10:48 AM   Placenta Date/Time: 2021 10:51 AM  Delivering clinician: Shawna Bradley MD   Other personnel present at delivery:  Provider Role   Shawna Bradley MD Obstetrician   Komal Lin,  "RN Charge Nurse   Daya Tavarez RN Delivery Nurse         Vaginal Counts     Initial count performed by 2 team members:  Two Team Members   HEATHER Disla       [place] Suture Needles Sponges (RETIRED) Instruments   Initial counts 2  5    Added to count       Relief counts       Final counts 2  5          Placed during labor Accounted for at the end of labor   FSE NA    IUPC NA    Cervadil NA          Relief count performed by 2 team members:  Two Team Members   Dr Kirk Shepard           Final count correct?: Yes     Apgars    Living status: Living   1 Minute 5 Minute 10 Minute 15 Minute 20 Minute   Skin color: 0  1       Heart rate: 2  2       Reflex irritability: 2  2       Muscle tone: 2  2       Respiratory effort: 2  2       Total: 8  9       Apgars assigned by: DAYA TAVAREZ     Cord    Vessels: 3 Vessels    Cord Complications: Nuchal   Nuchal Intervention: reduced         Nuchal cord description: loose nuchal cord         Cord Blood Disposition: Lab    Gases Sent?: No    Delayed cord clamping?: Yes    Cord Clamping Delay (seconds): 31-60 seconds       [place] Resuscitation    Methods: None      Measurements    Weight: 6 lb 7 oz Length: 1' 8\"   Head circumference: 31.8 cm       Skin to Skin and Feeding Plan    Skin to skin initiation date/time: 1841    Skin to skin with: Mother  Skin to skin end date/time: 1841    Breastfeeding initiated date/time: 2021 1115     Labor Events and Shoulder Dystocia    Fetal Tracing Prior to Delivery: Category 2  Fetal Tracing Comments: fetal tachycardia     Delivery (Maternal) (Provider to Complete) ([id number])    Episiotomy: None  Perineal lacerations: None    Repair suture: None  Genital tract inspection done: Pos     Blood Loss  Mother: Christianne Ferrell #[id number]   Start of Mother's Information    IO Blood Loss  21 0000 - 21 1048    Delivery QBL (mL) Hospital Encounter 25 mL    Postpartum QBL (mL) Hospital Encounter 147 mL    Total  172 " mL         End of Mother's Information  Mother: Christianne Ferrell #[id number]          Delivery - Provider to Complete ([id number])    Delivering clinician: Shawna Bradley MD  Attempted Delivery Types (Choose all that apply): Spontaneous Vaginal Delivery  Delivery Type (Choose the 1 that will go to the Birth History): Vaginal, Spontaneous                   Other personnel:  Provider Role   Shawna Bradley MD Obstetrician   Komal Lin, RN Charge Nurse   Daya Bustillos RN Delivery Nurse                Placenta    Date/Time: 4/18/2021 10:51 AM  Removal: Spontaneous  Disposition: Hospital disposal           Anesthesia    Method: Epidural  Cervical dilation at placement: 0-3                Presentation and Position    Presentation: Vertex                  Shawna Bradley MD

## 2021-04-18 NOTE — PLAN OF CARE
S: Admit to Inpatient   A: A:minimal variability, + accels, no decels, Category I  normal uterine activity  3.5/70-80/-1   Membrane status; SROM and clear amniotic fluid  No visits with results within 1 Day(s) from this visit.   Latest known visit with results is:   Admission on 04/17/2021, Discharged on 04/17/2021   Component Date Value     Rupture of Fetal Membran* 04/17/2021 Negative      Rupture of Fetal Membran* 04/17/2021 Negative      Dr. LULÚ Bradley informed of above and admission orders received.   R: Plan of care reviewed with patient. Oriented to room. Reviewed resource binder, [facility] book and paperwork to complete.

## 2021-04-18 NOTE — ANESTHESIA PREPROCEDURE EVALUATION
Anesthesia Pre-Procedure Evaluation    Patient: Christianne Ferrell   MRN: [id number] : 1990        Preoperative Diagnosis: * No surgery found *   Procedure :      Past Medical History:   Diagnosis Date     Chlamydia      Pregnancy induced hypertension       Past Surgical History:   Procedure Laterality Date     SURGICAL HISTORY OF -       T & A     SURGICAL HISTORY OF -       Left eye surgery      No Known Allergies   Social History     Tobacco Use     Smoking status: Current Every Day Smoker     Packs/day: 0.50     Types: Cigarettes     Smokeless tobacco: Never Used     Tobacco comment: down to 6-7cig/day with pregnancy   Substance Use Topics     Alcohol use: Not Currently     Comment: occas- quit with pregnancy      Wt Readings from Last 1 Encounters:   04/15/21 80.1 kg (176 lb 9.6 oz)        Anesthesia Evaluation   Pt has had prior anesthetic. Type: OB Labor Epidural.    No history of anesthetic complications       ROS/MED HX  ENT/Pulmonary:     (+) tobacco use, Current use, 1 packs/day,     Neurologic:  - neg neurologic ROS     Cardiovascular:  - neg cardiovascular ROS   (+) hypertension-----    METS/Exercise Tolerance: >4 METS    Hematologic:  - neg hematologic  ROS     Musculoskeletal:  - neg musculoskeletal ROS     GI/Hepatic:     (+) GERD,     Renal/Genitourinary:  - neg Renal ROS     Endo:     (+) Obesity,     Psychiatric/Substance Use:     (+) psychiatric history depression Recreational drug usage: Cannabis.    Infectious Disease:  - neg infectious disease ROS     Malignancy:  - neg malignancy ROS     Other: Comment: Hx of STD  PCOS     (+) Possibly pregnant, ,         Physical Exam    Airway        Mallampati: II   TM distance: > 3 FB   Neck ROM: full   Mouth opening: > 3 cm    Respiratory Devices and Support         Dental  no notable dental history         Cardiovascular   cardiovascular exam normal          Pulmonary   pulmonary exam normal            Other findings: Tongue,nose, cheek  piercing    OUTSIDE LABS:  CBC:   Lab Results   Component Value Date    WBC 14.9 (H) 01/28/2021    WBC 10.7 09/21/2020    HGB 11.9 01/28/2021    HGB 14.0 09/21/2020    HCT 35.0 01/28/2021    HCT 40.8 09/21/2020     01/28/2021     09/21/2020     BMP:   Lab Results   Component Value Date     06/22/2004    POTASSIUM 3.9 06/22/2004    CHLORIDE 107 06/22/2004    CO2 25 06/22/2004    BUN 7 06/22/2004    CR 0.63 09/21/2020    CR 0.66 04/01/2019    GLC 96 10/26/2017    GLC 96 06/22/2004     COAGS: No results found for: PTT, INR, FIBR  POC:   Lab Results   Component Value Date    HCG Negative 03/12/2018    HCGS Negative 11/09/2017     HEPATIC:   Lab Results   Component Value Date    ALBUMIN 3.8 11/09/2017    PROTTOTAL 8.0 11/09/2017    ALT 20 01/28/2021    AST 13 01/28/2021    ALKPHOS 112 11/09/2017    BILITOTAL 0.5 11/09/2017     OTHER:   Lab Results   Component Value Date    A1C 5.2 09/21/2020    KRISTOFER 8.8 06/22/2004    TSH 0.49 10/26/2017    T4 1.18 11/04/2008       Anesthesia Plan    ASA Status:  2      Anesthesia Type: Epidural.              Consents    Anesthesia Plan(s) and associated risks, benefits, and realistic alternatives discussed. Questions answered and patient/representative(s) expressed understanding.     - Discussed with:  Patient         Postoperative Care    Pain management: IV analgesics, Oral pain medications, Multi-modal analgesia.   PONV prophylaxis: Ondansetron (or other 5HT-3), Dexamethasone or Solumedrol     Comments:           neg OB ROS.       TITO Mendosa CRNA

## 2021-04-18 NOTE — PLAN OF CARE
ROM+ negative. Patient states that she had to change her pants x3 today for LOF. She also states that when she sits on the toilet, she has a gush of fluid prior to urinating. When patient went to BR, small to moderate amount of clear fluid noted on chux pad. 2nd ROM+ collected and sent to lab to R/O false negative.

## 2021-04-18 NOTE — PLAN OF CARE
S:Patient presents due to rule out ROM.  B:38w1d   Allergies: Patient has no known allergies.  A:moderate variablility, + accels, no decels, Category I  normal uterine activity  SVE 3/70/-1    Dr. LULÚ Bradley called and orders received.  Plan includes; Monitor, observe and reevaluate and Labs . Reviewed with patient and she agrees with plan.        Prenatal Breastfeeding Education Toolkit provided for patient to review,helping her to make an informed decision on a feeding choice for her baby. Patient accepted. Questions answered.    Oriented to room and call light.

## 2021-04-18 NOTE — PLAN OF CARE
VSS, afebrile. Up ad ebenezer. Tolerating activity. Denies need for pain meds at this time. Voiding spontaneously. Call light in reach. Encouraged to call for any questions, concerns, or needs.

## 2021-04-18 NOTE — PROGRESS NOTES
Pt was transferred to room 2048 at 1320 via ambulatory after attempting to void in the bathroom.  Pt was st cathed at 1226 to obtain the urine sample.  Pt was orientated to the room and tolerated the activity well.

## 2021-04-18 NOTE — H&P
[facility] Labor and Delivery History and Physical    Christianne Ferrell MRN# [id number]   Age: 30 year old YOB: 1990     Date of Admission:  2021    Primary care provider: Caro Barrios           Chief Complaint:   Christianne Ferrell is a 30 year old female who is 38w2d pregnant and being admitted for active labor management.          Pregnancy history:     OBSTETRIC HISTORY:    OB History    Para Term  AB Living   2 1 1 0 0 1   SAB TAB Ectopic Multiple Live Births   0 0 0 0 1      # Outcome Date GA Lbr Jeremy/2nd Weight Sex Delivery Anes PTL Lv   2 Current            1 Term 19 38w2d 13:40 / 03:45 3.04 kg (6 lb 11.2 oz) F Vag-Spont EPI N GURPREET      Complications: GBS, Preeclampsia/Hypertension      Name: Sandy      Apgar1: 9  Apgar5: 9      Obstetric Comments   #1 IOL for gestational HTN       EDC: Estimated Date of Delivery: 21    Prenatal Labs:   Lab Results   Component Value Date    ABO O 2021    RH Pos 2021    AS Neg 2021    HEPBANG Nonreactive 2020    CHPCRT Negative 2020    GCPCRT Negative 2020    HGB 12.1 2021       GBS Status:   Lab Results   Component Value Date    GBS Positive (A) 2021       Active Problem List  Patient Active Problem List   Diagnosis     Esophageal reflux     Depression     Tobacco use disorder     Non morbid obesity, unspecified obesity type     Insulin resistance     PCOS (polycystic ovarian syndrome)     GBS (group B Streptococcus carrier), +RV culture, currently pregnant     Prenatal care, subsequent pregnancy     History of gestational hypertension     Tetrahydrocannabinol (THC) use disorder, mild, abuse     Limited prenatal care in second trimester     Supervision of other normal pregnancy       Medication Prior to Admission  Medications Prior to Admission   Medication Sig Dispense Refill Last Dose     aspirin (ASA) 81 MG EC tablet Take 1 tablet (81 mg) by mouth daily 90  tablet 3 Past Month at Unknown time     Prenatal Vit-Fe Fumarate-FA (PRENATAL MULTIVITAMIN W/IRON) 27-0.8 MG tablet Take 1 tablet by mouth daily   Past Week at Unknown time     albuterol (PROAIR HFA/PROVENTIL HFA/VENTOLIN HFA) 108 (90 Base) MCG/ACT inhaler Inhale 2 puffs into the lungs every 4 hours as needed for shortness of breath / dyspnea or wheezing (cough) (Patient not taking: Reported on 4/15/2021) 1 Inhaler 0      ondansetron (ZOFRAN-ODT) 4 MG ODT tab Take 1 tablet (4 mg) by mouth every 6 hours as needed for nausea (Patient not taking: Reported on 4/8/2021) 20 tablet 3    .        Maternal Past Medical History:     Past Medical History:   Diagnosis Date     Chlamydia      Pregnancy induced hypertension 2019                       Family History:     Family History   Problem Relation Age of Onset     Cancer Mother         Cervical     Irritable Bowel Syndrome Mother      Depression Mother      Brain Tumor Mother         benign     Respiratory Father         Asthma     Diabetes Father      Alcohol/Drug Maternal Grandfather         alcohol     Respiratory Paternal Grandfather         Asthma     Diabetes Paternal Grandfather      Cancer Paternal Grandfather      Respiratory Brother         Asthma     Gastrointestinal Disease Brother         Reflux     Kidney Disease Maternal Grandmother      Lung Cancer Paternal Grandmother      Family history reviewed and updated in [facility]            Social History:     Social History     Tobacco Use     Smoking status: Current Every Day Smoker     Packs/day: 0.50     Types: Cigarettes     Smokeless tobacco: Never Used     Tobacco comment: down to 6-7cig/day with pregnancy   Substance Use Topics     Alcohol use: Not Currently     Comment: occas- quit with pregnancy            Review of Systems:   The Review of Systems is negative other than noted in the HPI          Physical Exam:   Vitals were reviewed  Temp: 98.5  F (36.9  C) Temp src: Oral BP: 105/55     Resp: 20 SpO2: 100 %       Constitutional:   awake, alert, cooperative, no apparent distress, and appears stated age     Lungs:   No increased work of breathing, good air exchange, clear to auscultation bilaterally, no crackles or wheezing     Cardiovascular:   normal S1 and S2      Cervix:   Membranes: SROM   Dilation: 5   Effacement: 90%   Station:-1   Consistency: soft   Position: Mid  Presentation:Cephalic  Fetal Heart Rate Tracing: reactive and reassuring  Tocometer: external monitor                       Assessment:   Christianne Ferrell is a 38w2d pregnant female admitted with active labor management.          Plan:   [facility]  Labor augmentation with Pitocin due to spacing contractions and no cervical change since last exam    Pain medication epidual  Anticipate     Shawna Bradley MD

## 2021-04-18 NOTE — PROGRESS NOTES
Dr. Bradley was called and informed of pt's SVE 5/90/-1.  MD stated to start pitocin augmentation.

## 2021-04-18 NOTE — DISCHARGE INSTRUCTIONS
Discharge Instructions for Undelivered Patients  [facility] Phone # [phone]    * Drink 8 to 12 glasses of liquids (milk, juice, water) every day  * Call your doctor if your baby is moving less than usual.    Call your provider if you notice:  * Swelling in your face or increased swelling in your hands or legs.  * Headaches that are not relieved by Tylenol (acetaminophen).  * Changes in your vision (blurring; seeing spots or stars).  * Nausea (sick to your stomach) and vomiting (throwing up).  * Weight gain of 5 pounds per week.  * Heartburn that doesn't go away.  * Signs of bladder infection: Pain when you urinate (use the toilet), needing to go more often or more urgently.  * The bag of gutierrez (membrane) breaks, or you notice leaking in your underwear.  * Bright red blood in your underwear.  * Abdominal (lower belly) or stomach pain.  * Second (plus) baby: Contractions (tightenings) less than 10 minutes apart and getting stronger.  * Increase or change in vaginal discharge (note the color and amount).    Schedule follow up appointment with [facility] by calling [phone]

## 2021-04-18 NOTE — PROGRESS NOTES
Pt FHT are tachy at 170's.  Mom is afebrile.  Dr. Bradley is on the unit and also evaluated the strip.  Will continue to monitor. Emiliana VILA was also made aware and is on the unit.

## 2021-04-19 LAB
BASOPHILS # BLD AUTO: 0 10E9/L (ref 0–0.2)
BASOPHILS NFR BLD AUTO: 0 %
DIFFERENTIAL METHOD BLD: ABNORMAL
EOSINOPHIL # BLD AUTO: 0 10E9/L (ref 0–0.7)
EOSINOPHIL NFR BLD AUTO: 0 %
ERYTHROCYTE [DISTWIDTH] IN BLOOD BY AUTOMATED COUNT: 12.9 % (ref 10–15)
HCT VFR BLD AUTO: 31.1 % (ref 35–47)
HGB BLD-MCNC: 10.3 G/DL (ref 11.7–15.7)
LYMPHOCYTES # BLD AUTO: 3.8 10E9/L (ref 0.8–5.3)
LYMPHOCYTES NFR BLD AUTO: 18 %
MCH RBC QN AUTO: 31.1 PG (ref 26.5–33)
MCHC RBC AUTO-ENTMCNC: 33.1 G/DL (ref 31.5–36.5)
MCV RBC AUTO: 94 FL (ref 78–100)
MONOCYTES # BLD AUTO: 1.5 10E9/L (ref 0–1.3)
MONOCYTES NFR BLD AUTO: 7 %
NEUTROPHILS # BLD AUTO: 15.8 10E9/L (ref 1.6–8.3)
NEUTROPHILS NFR BLD AUTO: 75 %
PLATELET # BLD AUTO: 240 10E9/L (ref 150–450)
PLATELET # BLD EST: ABNORMAL 10*3/UL
RBC # BLD AUTO: 3.31 10E12/L (ref 3.8–5.2)
RBC MORPH BLD: NORMAL
WBC # BLD AUTO: 21 10E9/L (ref 4–11)

## 2021-04-19 PROCEDURE — 36415 COLL VENOUS BLD VENIPUNCTURE: CPT | Performed by: OBSTETRICS & GYNECOLOGY

## 2021-04-19 PROCEDURE — 85025 COMPLETE CBC W/AUTO DIFF WBC: CPT | Performed by: OBSTETRICS & GYNECOLOGY

## 2021-04-19 PROCEDURE — 250N000009 HC RX 250: Performed by: OBSTETRICS & GYNECOLOGY

## 2021-04-19 PROCEDURE — 120N000001 HC R&B MED SURG/OB

## 2021-04-19 PROCEDURE — 258N000003 HC RX IP 258 OP 636: Performed by: OBSTETRICS & GYNECOLOGY

## 2021-04-19 PROCEDURE — 250N000011 HC RX IP 250 OP 636: Performed by: OBSTETRICS & GYNECOLOGY

## 2021-04-19 PROCEDURE — 250N000013 HC RX MED GY IP 250 OP 250 PS 637: Performed by: OBSTETRICS & GYNECOLOGY

## 2021-04-19 RX ADMIN — SODIUM CHLORIDE 3 MILLION UNITS: 9 INJECTION, SOLUTION INTRAVENOUS at 07:08

## 2021-04-19 RX ADMIN — PRENATAL VITAMINS-IRON FUMARATE 27 MG IRON-FOLIC ACID 0.8 MG TABLET 1 TABLET: at 08:20

## 2021-04-19 RX ADMIN — ACETAMINOPHEN 650 MG: 325 TABLET, FILM COATED ORAL at 18:11

## 2021-04-19 RX ADMIN — IBUPROFEN 800 MG: 800 TABLET, FILM COATED ORAL at 00:33

## 2021-04-19 RX ADMIN — GENTAMICIN SULFATE 140 MG: 40 INJECTION, SOLUTION INTRAMUSCULAR; INTRAVENOUS at 05:46

## 2021-04-19 RX ADMIN — SODIUM CHLORIDE 3 MILLION UNITS: 9 INJECTION, SOLUTION INTRAVENOUS at 10:31

## 2021-04-19 RX ADMIN — IBUPROFEN 800 MG: 800 TABLET, FILM COATED ORAL at 20:36

## 2021-04-19 RX ADMIN — SODIUM CHLORIDE 3 MILLION UNITS: 9 INJECTION, SOLUTION INTRAVENOUS at 02:30

## 2021-04-19 RX ADMIN — IBUPROFEN 800 MG: 800 TABLET, FILM COATED ORAL at 08:19

## 2021-04-19 RX ADMIN — GENTAMICIN SULFATE 140 MG: 40 INJECTION, SOLUTION INTRAMUSCULAR; INTRAVENOUS at 13:06

## 2021-04-19 RX ADMIN — IBUPROFEN 800 MG: 800 TABLET, FILM COATED ORAL at 14:35

## 2021-04-19 RX ADMIN — SODIUM CHLORIDE 3 MILLION UNITS: 9 INJECTION, SOLUTION INTRAVENOUS at 14:23

## 2021-04-19 RX ADMIN — CLINDAMYCIN PHOSPHATE 900 MG: 900 INJECTION, SOLUTION INTRAVENOUS at 11:54

## 2021-04-19 RX ADMIN — ACETAMINOPHEN 650 MG: 325 TABLET, FILM COATED ORAL at 00:33

## 2021-04-19 RX ADMIN — ACETAMINOPHEN 650 MG: 325 TABLET, FILM COATED ORAL at 04:37

## 2021-04-19 RX ADMIN — CLINDAMYCIN PHOSPHATE 900 MG: 900 INJECTION, SOLUTION INTRAVENOUS at 04:08

## 2021-04-19 ASSESSMENT — ACTIVITIES OF DAILY LIVING (ADL)
FALL_HISTORY_WITHIN_LAST_SIX_MONTHS: NO
TOILETING_ISSUES: NO

## 2021-04-19 NOTE — PLAN OF CARE
Mother complaining of cramping, Motrin 800 mg given with relief. Encouraged to soak in tub for comfort. Remains afebrile, antibiotics given as ordered. Independent with baby cares.

## 2021-04-19 NOTE — PROGRESS NOTES
[facility] Daily Postpartum Note    S: Christianne Ferrell is feeling well this morning. She denies any complaints. Her pain is well-controlled on pain medications. She tolerating a regular diet without nausea or vomiting. Ambulating without difficulty. Lochia is decreasing. Breastfeeding without questions or concerns. She is unsure for contraception.    O:   VS:   Patient Vitals for the past 24 hrs:   BP Temp Temp src Pulse Resp SpO2   21 0819 93/65 97.5  F (36.4  C) Oral 64 18 98 %   21 0000 99/54 97.7  F (36.5  C) Oral 91 18 97 %   21 1726 106/58 98.1  F (36.7  C) Oral 98 18 --   21 1252 95/53 -- -- -- -- --   21 1240 94/46 98.2  F (36.8  C) Axillary -- 20 --   21 1227 101/51 -- -- -- 20 --   21 1154 104/54 -- -- -- 20 --   21 1140 101/52 -- -- -- 20 --   21 1126 116/68 -- -- -- 20 --   21 1121 -- 102.4  F (39.1  C) Oral -- -- --   21 1110 117/62 -- -- -- 20 --   21 1055 118/58 -- -- -- 20 --   21 1032 -- 98.6  F (37  C) Oral -- -- --   21 0952 -- 98.6  F (37  C) Oral -- -- --       I/O last 3 completed shifts:  In: -   Out: 772 [Urine:600; Blood:172]    General: resting in bed, in NAD  CV: reg rate, well perfused  Resp: no increased work of breathing  Abdomen: soft, appropriately tender, nondistended  Fundus firm below the umbilicus  Extremities: non-tender, non-edematous     Recent Labs   Lab 21  0547 21  0340   HGB 10.3* 12.1       A: Christianne Ferrell is a 30 year old  who is PPD#1 s/p , doing well    P:  Continue routine pp cares  Heme HGB 10,3, bleeding minimal, cont to monitor  GI: tolerating regular diet  Feeding: breast  Contraception: unsure, reviewed options, considering IUD  Rh positive, Rubella NI  Endometritis- last fever yesterday at ~1100, will continue abx until 24 afebrile and continue to monitor  Disposition: routine PP cares, anticipate d/c tomorrow    Mary Kate GRIFFITH  MD Earlene, MD  [facility]   4/19/2021 9:49 AM

## 2021-04-19 NOTE — CONSULTS
Care Transitions Note:    CTS staff received notification from [facility] RN informing that a cord tissue segment was sent for drug detection panel based on positive for THC and smoking criteria.    CTS to follow for results.      Alyssa Cho RN, Care Coordinator [phone]

## 2021-04-19 NOTE — PLAN OF CARE
Data: Vital signs within normal limits. Postpartum checks within normal limits - see flow record. Patient  Is tolerating po intake. Patient is able to empty bladder independently. . Patient ambulating independently..   No apparent signs of infection. perineum healing well. Patient Is performing self cares and Is able to care for infant. Positive attachment behaviors are observed with infant. Support persons are not present.  Action:  Pain plan was discussed. Patient will request pain med when she is ready for it. Patient was medicated during the shift for cramping. See MAR.Patient education done about  cares. See flow record.  Response:   Patient reassessed within 1 hour after each medication for pain. Patient stated that pain had improved. Patient stated that she was comfortable. .   Plan: Anticipate discharge on 21.

## 2021-04-20 VITALS
HEART RATE: 87 BPM | OXYGEN SATURATION: 100 % | RESPIRATION RATE: 18 BRPM | SYSTOLIC BLOOD PRESSURE: 116 MMHG | DIASTOLIC BLOOD PRESSURE: 74 MMHG | TEMPERATURE: 97.5 F

## 2021-04-20 PROCEDURE — 250N000013 HC RX MED GY IP 250 OP 250 PS 637: Performed by: OBSTETRICS & GYNECOLOGY

## 2021-04-20 RX ADMIN — PRENATAL VITAMINS-IRON FUMARATE 27 MG IRON-FOLIC ACID 0.8 MG TABLET 1 TABLET: at 07:49

## 2021-04-20 RX ADMIN — IBUPROFEN 800 MG: 800 TABLET, FILM COATED ORAL at 11:16

## 2021-04-20 RX ADMIN — IBUPROFEN 800 MG: 800 TABLET, FILM COATED ORAL at 04:46

## 2021-04-20 NOTE — PLAN OF CARE
Patient progressing well.  Ambulating, Eating and voiding well. Independent with mother/baby cares. Bonding well with infant.  Breast feeding is going well, baby is latching well and feeding for good lengths of time. Patient rates uterine cramping Pain 7/10.  Taking Ibuprofen when due with good relief.  Made plan with patient to bring pain medication when Patient requests. Patient encouraged to report increased bleeding or clots.

## 2021-04-20 NOTE — DISCHARGE SUMMARY
[facility] Discharge Summary    Christianne Ferrell MRN# [id number]   Age: 30 year old YOB: 1990     Date of Admission:  4/18/2021  Date of Discharge::  4/20/2021  Admitting Physician:  Shawna Bradley MD  Discharge Physician:  Carla Cerda MD     Home clinic: [facility]          Admission Diagnoses:   Supervision of other normal pregnancy [Z34.80]          Discharge Diagnosis:   Normal spontaneous vaginal delivery  Intrauterine pregnancy at 38 weeks gestation  Intrapartum fever, resolved          Procedures:   Procedure(s): No additional procedures performed       No other procedures performed during this admission           Medications Prior to Admission:     Medications Prior to Admission   Medication Sig Dispense Refill Last Dose     Prenatal Vit-Fe Fumarate-FA (PRENATAL MULTIVITAMIN W/IRON) 27-0.8 MG tablet Take 1 tablet by mouth daily   Past Week at Unknown time     albuterol (PROAIR HFA/PROVENTIL HFA/VENTOLIN HFA) 108 (90 Base) MCG/ACT inhaler Inhale 2 puffs into the lungs every 4 hours as needed for shortness of breath / dyspnea or wheezing (cough) (Patient not taking: Reported on 4/15/2021) 1 Inhaler 0              Discharge Medications:     Current Discharge Medication List      CONTINUE these medications which have NOT CHANGED    Details   Prenatal Vit-Fe Fumarate-FA (PRENATAL MULTIVITAMIN W/IRON) 27-0.8 MG tablet Take 1 tablet by mouth daily      albuterol (PROAIR HFA/PROVENTIL HFA/VENTOLIN HFA) 108 (90 Base) MCG/ACT inhaler Inhale 2 puffs into the lungs every 4 hours as needed for shortness of breath / dyspnea or wheezing (cough)  Qty: 1 Inhaler, Refills: 0    Comments: Pharmacy may dispense brand covered by insurance (Proair, or proventil or ventolin or generic albuterol inhaler)  Associated Diagnoses: Cough                   Consultations:   No consultations were requested during this admission          Brief History of Labor:       30 year old   presented @ 38w2d to BC for active labor management.      Stage 1: patient presented @ 3-4/70-80/-1 and SROM.  Epidural for analgesia.  Labor progress stalled @ 5 cm and pitocin augmentation initiated.    Stage 2: Pushed twice.  Fetal tachycardia noted at time of delivery.       of viable male, 6#7, Apgars 8/9  Placenta with 3vc  Lacerations: skin abrasions but no suture needed  Mother and infant stable.     Patient spiked a temp to 102.4.  Will start on gent/clinda for presumed chorio/endometritis.  No other localizing signs or symptoms.      Shawna Bradley M.D.           Hospital Course:   The patient's hospital course was unremarkable.  On discharge, her pain was well controlled. Vaginal bleeding is similar to peak menstrual flow.  Voiding without difficulty.  Ambulating well and tolerating a normal diet.  No fever.  Breastfeeding well.  Infant is stable.  No bowel movement yet.*  She was discharged on post-partum day #2.    Post-partum hemoglobin:   Hemoglobin   Date Value Ref Range Status   2021 10.3 (L) 11.7 - 15.7 g/dL Final             Discharge Instructions and Follow-Up:   Discharge diet: Regular   Discharge activity: Pelvic rest: abstain from intercourse and do not use tampons for 6 week(s)   Discharge follow-up: Follow up with OB in 6 weeks   Wound care: Drink plenty of fluids           Discharge Disposition:   Discharged to home      Attestation:  I have reviewed today's vital signs, notes, medications, labs and imaging.    Carla Cerda MD

## 2021-04-20 NOTE — PROGRESS NOTES
[facility] Obstetrics Post-Partum Progress Note          Assessment and Plan:    Assessment:   Post-partum day #2  Normal spontaneous vaginal delivery  L&D complications: Intrapartum fever, resolved      Doing well.  No excessive bleeding  Pain well-controlled.      Plan:   Discharge ome           Interval History:   Doing well.  Pain is well-controlled.  No fevers.  No history of foul-smelling vaginal discharge.  Good appetite.  Denies chest pain, shortness of breath, nausea or vomiting.  Vaginal bleeding is similar to a heavy menstrual flow.  Ambulatory.  Breastfeeding well.          Significant Problems:    Active Problems:    Supervision of other normal pregnancy            Review of Systems:    The patient denies any chest pain, shortness of breath, excessive pain, fever, chills, purulent drainage from the wound, nausea or vomiting.          Medications:   All medications related to the patient's surgery have been reviewed          Physical Exam:     All vitals stable  Patient Vitals for the past 24 hrs:   BP Temp Temp src Pulse Resp SpO2   04/20/21 0802 116/74 97.5  F (36.4  C) Oral 87 18 100 %   04/20/21 0748 -- 97.6  F (36.4  C) Oral -- -- --   04/20/21 0446 115/55 98.1  F (36.7  C) Oral 79 18 99 %   04/19/21 1500 106/67 98  F (36.7  C) Oral 83 18 --     Uterine fundus is firm, non-tender and at the level of the umbilicus          Data:     All laboratory data related to this surgery reviewed  Hemoglobin   Date Value Ref Range Status   04/19/2021 10.3 (L) 11.7 - 15.7 g/dL Final   04/18/2021 12.1 11.7 - 15.7 g/dL Final   01/28/2021 11.9 11.7 - 15.7 g/dL Final   09/21/2020 14.0 11.7 - 15.7 g/dL Final   04/03/2019 10.6 (L) 11.7 - 15.7 g/dL Final     No imaging studies have been ordered    Carla Cerda MD

## 2021-04-20 NOTE — PLAN OF CARE
Vitals stable; pain well controlled with oral medications. Tolerating regular diet. Voiding without difficulty; has had a BM since delivery. Ambulating independently. Feeling confident with  cares. Desires discharge today. Plan of care reviewed with patient.

## 2021-04-24 LAB
BACTERIA SPEC CULT: NO GROWTH
BACTERIA SPEC CULT: NO GROWTH
SPECIMEN SOURCE: NORMAL
SPECIMEN SOURCE: NORMAL

## 2021-05-21 ENCOUNTER — PRENATAL OFFICE VISIT (OUTPATIENT)
Dept: OBGYN | Facility: CLINIC | Age: 31
End: 2021-05-21
Payer: COMMERCIAL

## 2021-05-21 VITALS
DIASTOLIC BLOOD PRESSURE: 72 MMHG | HEART RATE: 75 BPM | WEIGHT: 166.1 LBS | BODY MASS INDEX: 30.57 KG/M2 | SYSTOLIC BLOOD PRESSURE: 115 MMHG | HEIGHT: 62 IN | RESPIRATION RATE: 14 BRPM | TEMPERATURE: 98.8 F

## 2021-05-21 PROCEDURE — 99207 PR POST PARTUM EXAM: CPT | Performed by: OBSTETRICS & GYNECOLOGY

## 2021-05-21 ASSESSMENT — MIFFLIN-ST. JEOR: SCORE: 1436.67

## 2021-05-21 NOTE — PROGRESS NOTES
"[facility]    Post Partum Office Visit    CC: Post partum visit    HPI: Christianne Ferrell is a 30 year old  who presents for a 6 week post-partum visit.  Patient delivered on 21, by Dr. Bradley at 38w2d gestation.  Patient delivered via .  Complications During Labor: chorioamnionitis/endometritis.    Patient is doing well post partum. No complaints. She is breast feeding. Has not yet had a menses. Bleeding has essentially resolved, occasional pink when she wipes. She resumed intercourse 2 nights ago, no contraception. Reports mood is stable.      Information  Sex: male  Complications: none  Feeding Method: breast feeding    Maternal Information  Postpartum Depression:denies  Resumed [place]:yes  Last Pap Smear: 2020 NIL  Birth Control Method: declines    ROS:  General/Constitutional:  Denies chills or fever  Respiratory: Denies shortness of breath, cough, wheezing   Cardiovascular: Denies chest pain, exertional pain, irregular heartbeat  Gastrointestinal:  Denies abdominal pain, constipation, nausea, or vomiting  Genitourinary: Denies hematuria, difficulty urinating, frequency, or dysuria   Skin: Denies dry skin, itching, rash, new skin lesions  Musculoskeletal: Denies aching muscles or joints, swelling in joints, back pain, shoulder pain, or painful feet, no peripheral edema  Psychiatric: Denies post partum depressio    Physical Exam:   Vitals:    21 0926   BP: 115/72   BP Location: Right arm   Patient Position: Sitting   Cuff Size: Adult Large   Pulse: 75   Resp: 14   Temp: 98.8  F (37.1  C)   TempSrc: Tympanic   Weight: 75.3 kg (166 lb 1.6 oz)   Height: 1.575 m (5' 2\")      Estimated body mass index is 30.38 kg/m  as calculated from the following:    Height as of this encounter: 1.575 m (5' 2\").    Weight as of this encounter: 75.3 kg (166 lb 1.6 oz).    General appearance: well-hydrated, A&O x 3, no apparent distress  Lungs: Equal expansion bilaterally, " no accessory muscle use  Heart: No heaves or thrills. No peripheral varicosities  Abdomen: Soft, non-tender, non-distended. No rebound, rigidity, or guarding.  Extremities: no edema  Neuro: CN II-XII grossly intact  Genitourinary:  External genitalia: no erythema, no lesions.   Urethral meatus appropriate location without lesions or prolapse  Urethra: No masses, tenderness, or scarring  Bladder no fullness, masses, or tenderness.  Anus and Perineum: Unremarkable, no visible lesions  Vagina: Normal, healthy pink mucosa without any lesions. Physiologic vaginal discharge.   Cervix: normal appearance, no cervical motion tenderness.   Uterus: involuted, normal size, shape and consistency.   Adnexa: no masses or tenderness bilaterally.        Assessment and Plan:     Encounter Diagnosis   Name Primary?     Routine postpartum follow-up Yes       Appropriate post partum recovery. She has resumed intercourse without contraception. Discussed risk of ovulation/pregnancy even if she is breast feeding and not getting menses. She declines any contraception at this time. Discussed appropriate pregnancy spacing and risks of short interval pregnancy.      Plan for routine GYN cares, PAP smear due 2023.       Shanice Smith DO

## 2021-05-21 NOTE — NURSING NOTE
"Initial /72 (BP Location: Right arm, Patient Position: Sitting, Cuff Size: Adult Large)   Pulse 75   Temp 98.8  F (37.1  C) (Tympanic)   Resp 14   Ht 1.575 m (5' 2\")   Wt 75.3 kg (166 lb 1.6 oz)   LMP 07/24/2020   Breastfeeding Yes   BMI 30.38 kg/m   Estimated body mass index is 30.38 kg/m  as calculated from the following:    Height as of this encounter: 1.575 m (5' 2\").    Weight as of this encounter: 75.3 kg (166 lb 1.6 oz). .      "

## 2021-05-25 ENCOUNTER — MYC MEDICAL ADVICE (OUTPATIENT)
Dept: OBGYN | Facility: CLINIC | Age: 31
End: 2021-05-25

## 2021-05-26 NOTE — TELEPHONE ENCOUNTER
Forms printed and started -given to Shanice Smith, DO to sign then will fax.    -Danni CORLEY [facility]  [facility] [place]

## 2021-06-02 ENCOUNTER — MYC MEDICAL ADVICE (OUTPATIENT)
Dept: OBGYN | Facility: CLINIC | Age: 31
End: 2021-06-02

## 2021-06-10 ENCOUNTER — MYC MEDICAL ADVICE (OUTPATIENT)
Dept: OBGYN | Facility: CLINIC | Age: 31
End: 2021-06-10

## 2021-10-24 NOTE — PROGRESS NOTES
S:Patient presents due to  labor evaluation.  B:37w6d   Allergies: Patient has no known allergies.  A:Vital Signs  Temp: 98.3  F (36.8  C)  Temp src: Oral  Resp: 18  BP: 125/71        FHTs are 160-165        Dr. LULÚ Bradley called and orders received.  Plan includes; Encourage po fluids, PO hydroxyzine. Reviewed with patient and she agrees with plan.   Bill of Rights given & questions discussed?: Yes    Prenatal Breastfeeding Education Toolkit provided for patient to review,helping her to make an informed decision on a feeding choice for her baby. Patient accepted. Questions answered.    Oriented to room and call light.          No

## 2022-03-24 ENCOUNTER — OFFICE VISIT (OUTPATIENT)
Dept: URGENT CARE | Facility: URGENT CARE | Age: 32
End: 2022-03-24
Payer: COMMERCIAL

## 2022-03-24 VITALS
HEART RATE: 89 BPM | WEIGHT: 153 LBS | SYSTOLIC BLOOD PRESSURE: 118 MMHG | TEMPERATURE: 98.1 F | BODY MASS INDEX: 27.98 KG/M2 | OXYGEN SATURATION: 100 % | DIASTOLIC BLOOD PRESSURE: 80 MMHG

## 2022-03-24 DIAGNOSIS — S05.01XA ABRASION OF RIGHT CORNEA, INITIAL ENCOUNTER: Primary | ICD-10-CM

## 2022-03-24 PROCEDURE — 99213 OFFICE O/P EST LOW 20 MIN: CPT | Performed by: FAMILY MEDICINE

## 2022-03-24 RX ORDER — OXYCODONE AND ACETAMINOPHEN 5; 325 MG/1; MG/1
1 TABLET ORAL EVERY 6 HOURS PRN
Qty: 6 TABLET | Refills: 0 | Status: SHIPPED | OUTPATIENT
Start: 2022-03-24 | End: 2022-03-27

## 2022-03-24 RX ORDER — POLYMYXIN B SULFATE AND TRIMETHOPRIM 1; 10000 MG/ML; [USP'U]/ML
1-2 SOLUTION OPHTHALMIC 3 TIMES DAILY
Qty: 2 ML | Refills: 0 | Status: SHIPPED | OUTPATIENT
Start: 2022-03-24 | End: 2022-03-29

## 2022-03-24 NOTE — PROGRESS NOTES
SUBJECTIVE: The patient suffered a right corneal abrasion 12 hours ago. Mechanism of injury: hit accidentally by her kid with his fingernail. Pain mod to severe since. Watery drainage. Vision clear.     OBJECTIVE: She appears well, vitals are normal. Corneal abrasion noted right eye  About 2mm circular area. GEORGIE, fundi normal. Visual acuity per Nurse's note.    ASSESSMENT: Corneal abrasion    PLAN: polytrim and pain control follow up appointment given in 24-48 hours for re-examination of the injury by optho. Use of OTC  meds. discussed oxycodone for pain for severe pain

## 2022-03-24 NOTE — PATIENT INSTRUCTIONS
Patient Education     Corneal Abrasion    You have a scratch or scrape (abrasion) on your cornea. The cornea is the clear part in the front of the eye. This sensitive area is very painful when injured. You may make tears frequently, and your vision may be blurry until the injury heals. You may be sensitive to light.   This part of the body heals quickly. You can expect the pain to go away within 24 to 48 hours. If the abrasion is large or deep, your doctor may apply an eye patch, although this is not always done. An antibiotic ointment or eye drops may also be used to prevent infection.   Numbing drops may be used to relieve the pain temporarily so that your eyes can be examined. But these drops can t be prescribed for home use because that would prevent healing and lead to more serious problems. Also, if you can t feel your eye, there is a chance of accidentally injuring it further without knowing it.   Home care    A cold pack may be applied over the eye (or eye patch) for 20 minutes at a time, to reduce pain. To make a cold pack, put ice cubes in a plastic bag that seals at the top. Wrap the bag in a clean, thin towel or cloth.    You may use acetaminophen or ibuprofen to control pain, unless another pain medicine was prescribed. If you have chronic liver or kidney disease, talk with your healthcare provider before using these medicines. Also talk with your provider if you have ever had a stomach ulcer or gastrointestinal bleeding.    Rest your eyes and don t read until symptoms are gone.    If you use contact lenses, don t wear them until all symptoms are gone.    If your vision is affected by the corneal abrasion or if an eye patch was applied, don t drive a motor vehicle or operate machinery until all symptoms are gone. You may have trouble judging distances using only one eye.    If your eyes are sensitive to light, try wearing sunglasses, or stay indoors until symptoms go away.    Follow-up care  Follow up  with your healthcare provider, or as advised.    If no patch was put on your eye and the pain continues for more than 48 hours, you should have another exam. Contact your healthcare provider to arrange this.    If your eye was patched and you were asked to remove the patch yourself, see your healthcare provider. Contact your healthcare provider if you still have pain after the patch is removed.    If you were given a return appointment for patch removal and re-examination, be sure to keep the appointment. Leaving the patch in place longer than advised could be harmful.  When to seek medical advice  Call your healthcare provider right away if any of these occur.    Eye pain gets worse or does not get better after 24 hours    Discharge from the eye    Redness of the eye or swelling of the eyelids gets worse    Vision gets worse    Symptoms get worse after the abrasion has healed  Jeremias last reviewed this educational content on 2/1/2020 2000-2021 [employer]. All rights reserved. This information is not intended as a substitute for professional medical care. Always follow your healthcare professional's instructions.

## 2022-03-24 NOTE — LETTER
March 24, 2022      Christianne Ferrell  [address] [address] [facility] [address]        To Whom It May Concern,     Christianne VICTORIANO Ferrell was seen today. Please excuse her absence. She should be able to return on March 26, 2022       Sincerely,        Donnie Ceja MD

## 2022-11-02 ENCOUNTER — PRENATAL OFFICE VISIT (OUTPATIENT)
Dept: OBGYN | Facility: CLINIC | Age: 32
End: 2022-11-02

## 2022-11-02 ENCOUNTER — APPOINTMENT (OUTPATIENT)
Dept: OBGYN | Facility: CLINIC | Age: 32
End: 2022-11-02
Payer: COMMERCIAL

## 2022-11-02 DIAGNOSIS — Z34.80 PRENATAL CARE OF MULTIGRAVIDA, ANTEPARTUM: Primary | ICD-10-CM

## 2022-11-02 PROCEDURE — 99207 PR NO CHARGE NURSE ONLY: CPT | Performed by: OBSTETRICS & GYNECOLOGY

## 2022-11-02 NOTE — PROGRESS NOTES
Denied need for review of teaching  [facility] OB Intake Nurse    Patient supplied answers from flow sheet for:  Prenatal OB Questionnaire.  Past Medical History  Have you ever recieved care for your mental health? : No  Have you ever been in a major accident or suffered serious trauma?: No  Within the last year, has anyone hit, slapped, kicked or otherwise hurt you?: No  In the last year, has anyone forced you to have sex when you didn't want to?: No    Past Medical History 2   Have you ever received a blood transfusion?: No  Would you accept a blood transfusion if was medically recommended?: Yes  Does anyone in your home smoke?: (!) Yes (patient)   Is your blood type Rh negative?: No  Have you ever ?: (!) Yes  Have you been hospitalized for a nonsurgical reason excluding normal delivery?: No  Have you ever had an abnormal pap smear?: (!) Yes    Past Medical History (Continued)  Do you have a history of abnormalities of the uterus?: No  Did your mother take JAG or any other hormones when she was pregnant with you?: Unknown  Do you have any other problems we have not asked about which you feel may be important to this pregnancy?: No

## 2022-11-07 LAB
ABO/RH(D): NORMAL
ANTIBODY SCREEN: NEGATIVE
SPECIMEN EXPIRATION DATE: NORMAL

## 2022-11-08 ENCOUNTER — PRENATAL OFFICE VISIT (OUTPATIENT)
Dept: OBGYN | Facility: CLINIC | Age: 32
End: 2022-11-08
Payer: COMMERCIAL

## 2022-11-08 VITALS
DIASTOLIC BLOOD PRESSURE: 82 MMHG | WEIGHT: 155 LBS | HEIGHT: 62 IN | HEART RATE: 119 BPM | RESPIRATION RATE: 16 BRPM | TEMPERATURE: 98.1 F | BODY MASS INDEX: 28.52 KG/M2 | SYSTOLIC BLOOD PRESSURE: 135 MMHG

## 2022-11-08 DIAGNOSIS — Z87.59 HISTORY OF GESTATIONAL HYPERTENSION: Primary | ICD-10-CM

## 2022-11-08 DIAGNOSIS — Z34.82 PRENATAL CARE, SUBSEQUENT PREGNANCY IN SECOND TRIMESTER: ICD-10-CM

## 2022-11-08 PROBLEM — O99.820 GBS (GROUP B STREPTOCOCCUS CARRIER), +RV CULTURE, CURRENTLY PREGNANT: Status: RESOLVED | Noted: 2019-03-25 | Resolved: 2022-11-08

## 2022-11-08 PROBLEM — O09.32 LIMITED PRENATAL CARE IN SECOND TRIMESTER: Status: RESOLVED | Noted: 2021-01-07 | Resolved: 2022-11-08

## 2022-11-08 PROBLEM — Z34.80 SUPERVISION OF OTHER NORMAL PREGNANCY: Status: RESOLVED | Noted: 2021-04-17 | Resolved: 2022-11-08

## 2022-11-08 LAB
ALBUMIN MFR UR ELPH: 11.4 MG/DL
ALBUMIN UR-MCNC: NEGATIVE MG/DL
ALT SERPL W P-5'-P-CCNC: 26 U/L (ref 10–35)
AMPHETAMINES UR QL SCN: ABNORMAL
AMPHETAMINES UR QL: NOT DETECTED
APPEARANCE UR: CLEAR
AST SERPL W P-5'-P-CCNC: 22 U/L (ref 10–35)
BARBITURATES UR QL SCN: ABNORMAL
BARBITURATES UR QL SCN: NOT DETECTED
BENZODIAZ UR QL SCN: ABNORMAL
BENZODIAZ UR QL SCN: NOT DETECTED
BILIRUB UR QL STRIP: NEGATIVE
BUPRENORPHINE UR QL: NOT DETECTED
BZE UR QL SCN: ABNORMAL
CANNABINOIDS UR QL SCN: ABNORMAL
CANNABINOIDS UR QL: DETECTED
COCAINE UR QL SCN: NOT DETECTED
COLOR UR AUTO: YELLOW
CREAT SERPL-MCNC: 0.55 MG/DL (ref 0.51–0.95)
CREAT UR-MCNC: 138.3 MG/DL
D-METHAMPHET UR QL: NOT DETECTED
ERYTHROCYTE [DISTWIDTH] IN BLOOD BY AUTOMATED COUNT: 11.9 % (ref 10–15)
GFR SERPL CREATININE-BSD FRML MDRD: >90 ML/MIN/1.73M2
GLUCOSE UR STRIP-MCNC: NEGATIVE MG/DL
HBV SURFACE AG SERPL QL IA: NONREACTIVE
HCT VFR BLD AUTO: 37.9 % (ref 35–47)
HCV AB SERPL QL IA: NONREACTIVE
HGB BLD-MCNC: 12.8 G/DL (ref 11.7–15.7)
HGB UR QL STRIP: ABNORMAL
HIV 1+2 AB+HIV1 P24 AG SERPL QL IA: NONREACTIVE
KETONES UR STRIP-MCNC: NEGATIVE MG/DL
LEUKOCYTE ESTERASE UR QL STRIP: NEGATIVE
MCH RBC QN AUTO: 30.8 PG (ref 26.5–33)
MCHC RBC AUTO-ENTMCNC: 33.8 G/DL (ref 31.5–36.5)
MCV RBC AUTO: 91 FL (ref 78–100)
METHADONE UR QL SCN: NOT DETECTED
NITRATE UR QL: NEGATIVE
OPIATES UR QL SCN: ABNORMAL
OPIATES UR QL SCN: NOT DETECTED
OXYCODONE UR QL SCN: NOT DETECTED
PCP QUAL URINE (ROCHE): ABNORMAL
PCP UR QL SCN: NOT DETECTED
PH UR STRIP: 8 [PH] (ref 5–7)
PLATELET # BLD AUTO: 311 10E3/UL (ref 150–450)
PROPOXYPH UR QL: NOT DETECTED
PROT/CREAT 24H UR: 0.08 MG/MG CR (ref 0–0.2)
RBC # BLD AUTO: 4.16 10E6/UL (ref 3.8–5.2)
RBC #/AREA URNS AUTO: ABNORMAL /HPF
SP GR UR STRIP: 1.02 (ref 1–1.03)
SQUAMOUS #/AREA URNS AUTO: ABNORMAL /LPF
T PALLIDUM AB SER QL: NONREACTIVE
TRICYCLICS UR QL SCN: NOT DETECTED
UROBILINOGEN UR STRIP-ACNC: 0.2 E.U./DL
WBC # BLD AUTO: 10.9 10E3/UL (ref 4–11)
WBC #/AREA URNS AUTO: ABNORMAL /HPF

## 2022-11-08 PROCEDURE — 85027 COMPLETE CBC AUTOMATED: CPT | Performed by: OBSTETRICS & GYNECOLOGY

## 2022-11-08 PROCEDURE — 87086 URINE CULTURE/COLONY COUNT: CPT | Performed by: OBSTETRICS & GYNECOLOGY

## 2022-11-08 PROCEDURE — 82565 ASSAY OF CREATININE: CPT | Performed by: OBSTETRICS & GYNECOLOGY

## 2022-11-08 PROCEDURE — 76817 TRANSVAGINAL US OBSTETRIC: CPT | Performed by: OBSTETRICS & GYNECOLOGY

## 2022-11-08 PROCEDURE — 84460 ALANINE AMINO (ALT) (SGPT): CPT | Performed by: OBSTETRICS & GYNECOLOGY

## 2022-11-08 PROCEDURE — 87389 HIV-1 AG W/HIV-1&-2 AB AG IA: CPT | Performed by: OBSTETRICS & GYNECOLOGY

## 2022-11-08 PROCEDURE — 86901 BLOOD TYPING SEROLOGIC RH(D): CPT | Performed by: OBSTETRICS & GYNECOLOGY

## 2022-11-08 PROCEDURE — 80307 DRUG TEST PRSMV CHEM ANLYZR: CPT | Performed by: OBSTETRICS & GYNECOLOGY

## 2022-11-08 PROCEDURE — 84156 ASSAY OF PROTEIN URINE: CPT | Mod: 59 | Performed by: OBSTETRICS & GYNECOLOGY

## 2022-11-08 PROCEDURE — 36415 COLL VENOUS BLD VENIPUNCTURE: CPT | Performed by: OBSTETRICS & GYNECOLOGY

## 2022-11-08 PROCEDURE — 81001 URINALYSIS AUTO W/SCOPE: CPT | Mod: 59 | Performed by: OBSTETRICS & GYNECOLOGY

## 2022-11-08 PROCEDURE — 84450 TRANSFERASE (AST) (SGOT): CPT | Performed by: OBSTETRICS & GYNECOLOGY

## 2022-11-08 PROCEDURE — 86762 RUBELLA ANTIBODY: CPT | Performed by: OBSTETRICS & GYNECOLOGY

## 2022-11-08 PROCEDURE — 86803 HEPATITIS C AB TEST: CPT | Performed by: OBSTETRICS & GYNECOLOGY

## 2022-11-08 PROCEDURE — 99207 PR FIRST OB VISIT: CPT | Performed by: OBSTETRICS & GYNECOLOGY

## 2022-11-08 PROCEDURE — 87591 N.GONORRHOEAE DNA AMP PROB: CPT | Performed by: OBSTETRICS & GYNECOLOGY

## 2022-11-08 PROCEDURE — 86900 BLOOD TYPING SEROLOGIC ABO: CPT | Performed by: OBSTETRICS & GYNECOLOGY

## 2022-11-08 PROCEDURE — 86850 RBC ANTIBODY SCREEN: CPT | Performed by: OBSTETRICS & GYNECOLOGY

## 2022-11-08 PROCEDURE — 86780 TREPONEMA PALLIDUM: CPT | Performed by: OBSTETRICS & GYNECOLOGY

## 2022-11-08 PROCEDURE — 87340 HEPATITIS B SURFACE AG IA: CPT | Performed by: OBSTETRICS & GYNECOLOGY

## 2022-11-08 PROCEDURE — 87491 CHLMYD TRACH DNA AMP PROBE: CPT | Performed by: OBSTETRICS & GYNECOLOGY

## 2022-11-08 RX ORDER — ONDANSETRON 4 MG/1
4 TABLET, ORALLY DISINTEGRATING ORAL EVERY 8 HOURS PRN
Qty: 10 TABLET | Refills: 1 | Status: ON HOLD | OUTPATIENT
Start: 2022-11-08 | End: 2023-04-29

## 2022-11-08 NOTE — NURSING NOTE
"Initial /82 (BP Location: Left arm, Patient Position: Chair, Cuff Size: Adult Regular)   Pulse 119   Temp 98.1  F (36.7  C) (Tympanic)   Resp 16   Ht 1.575 m (5' 2\")   Wt 70.3 kg (155 lb)   LMP 07/31/2022 (Approximate)   BMI 28.35 kg/m   Estimated body mass index is 28.35 kg/m  as calculated from the following:    Height as of this encounter: 1.575 m (5' 2\").    Weight as of this encounter: 70.3 kg (155 lb). .      "
normal...

## 2022-11-08 NOTE — PROGRESS NOTES
[facility]    New OB Visit Note    CC: New OB     Subjective:    Christianne is a 31 year old  at 14w2d by LMP who presents for her initial OB visit. This is an unplanned pregnancy, initially shocked but now getting more excited. She reports feeling better. Had some nausea and vomiting, lost 6 pounds a few weeks ago. Was using THC gummies to help with the nausea.    Past OB History:    Prenatal Complications: History GHTN in first pregnancy   Delivery Complications: None    OB History    Para Term  AB Living   3 2 2 0 0 2   SAB IAB Ectopic Multiple Live Births   0 0 0 0 2      # Outcome Date GA Lbr Jeremy/2nd Weight Sex Delivery Anes PTL Lv   3 Current            2 Term 21 38w2d 10:31 / 00:17 2.92 kg (6 lb 7 oz) M Vag-Spont EPI N GURPREET      Name: Spencer      Apgar1: 8  Apgar5: 9   1 Term 19 38w2d 13:40 / 03:45 3.04 kg (6 lb 11.2 oz) F Vag-Spont EPI N GURPREET      Complications: GBS, Preeclampsia/Hypertension      Name: Sandy      Apgar1: 9  Apgar5: 9      Obstetric Comments   #1 IOL for gestational HTN       Past Medical History:   Diagnosis Date     Chlamydia      Pregnancy induced hypertension        Past Surgical History:   Procedure Laterality Date     SURGICAL HISTORY OF -       T & A     SURGICAL HISTORY OF -       Left eye surgery       Current Outpatient Medications   Medication Sig Dispense Refill     aspirin (ASA) 81 MG EC tablet Take 1 tablet (81 mg) by mouth daily 90 tablet 1     ondansetron (ZOFRAN ODT) 4 MG ODT tab Take 1 tablet (4 mg) by mouth every 8 hours as needed for nausea 10 tablet 1     Prenatal Vit-Fe Fumarate-FA (PRENATAL MULTIVITAMIN W/IRON) 27-0.8 MG tablet Take 1 tablet by mouth daily         Family History   Problem Relation Age of Onset     Cancer Mother         Cervical     Irritable Bowel Syndrome Mother      Depression Mother      Brain Tumor Mother         benign     Respiratory Father         Asthma     Diabetes Father          "type II     Respiratory Brother         Asthma     Gastrointestinal Disease Brother         Reflux     Kidney Disease Maternal Grandmother      Alcohol/Drug Maternal Grandfather         alcohol     Lung Cancer Paternal Grandmother      Respiratory Paternal Grandfather         Asthma     Diabetes Paternal Grandfather      Cancer Paternal Grandfather        Social History     Tobacco Use     Smoking status: Every Day     Packs/day: 0.50     Types: Cigarettes     Smokeless tobacco: Never     Tobacco comments:     down to 8-10 cig/day with pregnancy   Substance Use Topics     Alcohol use: Not Currently     Comment: rare- quit with pregnancy       ROS: A 10 pt ROS was completed and found to be negative unless mentioned in the HPI.     Objective:    /82 (BP Location: Left arm, Patient Position: Chair, Cuff Size: Adult Regular)   Pulse 119   Temp 98.1  F (36.7  C) (Tympanic)   Resp 16   Ht 1.575 m (5' 2\")   Wt 70.3 kg (155 lb)   LMP 07/31/2022 (Approximate)   BMI 28.35 kg/m      Estimated body mass index is 28.35 kg/m  as calculated from the following:    Height as of this encounter: 1.575 m (5' 2\").    Weight as of this encounter: 70.3 kg (155 lb).    General appearance: well-hydrated, A&O x 3, no apparent distress  Lungs: Equal expansion bilaterally, no accessory muscle use  Heart: No heaves or thrills. No peripheral varicosities  Constitutional: See vitals  Extremities: no edema  Genitourinary:  External genitalia: no erythema, no lesions.   Anus and Perineum: Unremarkable, no visible lesions  Cervix: no cervical motion tenderness.   Uterus: gravid      Bedside Ultrasound    Transvaginal ultrasound was performed. A single live intrauterine pregnancy was seen.      CRL= 7.08 cm   FHT= 157 bpm  EGA= 13 weeks 2 days  EDC based on US = 5/14/23  EDC by LMP= 5/7/23  FINAL EDC= 5/7/23    Assessment and Plan:     Encounter Diagnoses   Name Primary?     History of gestational hypertension Yes     Prenatal care, " subsequent pregnancy in second trimester        31 year old  at 14w2d by LMP who presents for her initial OB visit.     1) Plan to draw new OB lab today   2) Discussed routine prenatal care, group practice model at [facility], tertiary support and frequency of visits. Also discussed options for genetic screening tests. Patient declines genetic testing.   3) Dating/viability US performed today   4) Concerns:    -Nausea and vomiting: has been using THC gummies. Discussed that this is not recommended in pregnancy. She has discontinued use since feeling better. Checking UDS today, expecting to be positive. Will plan for retesting upon admission for labor, discussed with patient. Rx for Zofran for any continued nausea.  5) PMH/OBHx problems: Hx GHTN in first pregnancy. Baseline HELLP labs ordered, Rx for ASA.  6) Medication review: no changes, continue prenatal vitamin   7) Reviewed miscarriage precautions (present to ED or call clinic with abdominal pain, vaginal bleeding or fever)   8) Weight gain: discussed weight gain expectations (BMI <18.5: 28-40lbs, BMI 18.5-25: 25-35lbs, BMI 25-30: 15-25lbs, BMI >30: 11-20lbs)   9) PAP smear: up to date  10) Delivery plan: continue to discuss route of delivery, breastfeeding, pp contraception, pain management in labor  11) Immunizations: Tdap at 28wks  12) No increased risk for gestational diabetes, plan for screen at 28wks     Return to clinic in 4 weeks.     Shanice Smith DO

## 2022-11-09 LAB
BACTERIA UR CULT: NO GROWTH
C TRACH DNA SPEC QL PROBE+SIG AMP: NEGATIVE
N GONORRHOEA DNA SPEC QL NAA+PROBE: NEGATIVE
RUBV IGG SERPL QL IA: 0.85 INDEX
RUBV IGG SERPL QL IA: NORMAL

## 2022-12-06 ENCOUNTER — PRENATAL OFFICE VISIT (OUTPATIENT)
Dept: OBGYN | Facility: CLINIC | Age: 32
End: 2022-12-06
Payer: COMMERCIAL

## 2022-12-06 VITALS
RESPIRATION RATE: 16 BRPM | WEIGHT: 157.9 LBS | HEIGHT: 62 IN | BODY MASS INDEX: 29.06 KG/M2 | TEMPERATURE: 96.7 F | HEART RATE: 111 BPM | DIASTOLIC BLOOD PRESSURE: 67 MMHG | SYSTOLIC BLOOD PRESSURE: 120 MMHG

## 2022-12-06 DIAGNOSIS — Z34.82 PRENATAL CARE, SUBSEQUENT PREGNANCY IN SECOND TRIMESTER: Primary | ICD-10-CM

## 2022-12-06 PROCEDURE — 99207 PR PRENATAL VISIT: CPT | Performed by: STUDENT IN AN ORGANIZED HEALTH CARE EDUCATION/TRAINING PROGRAM

## 2022-12-06 NOTE — PROGRESS NOTES
"[facility]  Return OB Note    Subjective:  Denies vaginal bleeding, loss of fluid, or regular contractions. Noted fetal movement.  Complaints today: ongoing nausea. Has not picked up zofran yet.    Objective:  /67 (BP Location: Right arm, Patient Position: Sitting, Cuff Size: Adult Regular)   Pulse 111   Temp (!) 96.7  F (35.9  C) (Tympanic)   Resp 16   Ht 1.575 m (5' 2\")   Wt 71.6 kg (157 lb 14.4 oz)   LMP 2022 (Approximate)   BMI 28.88 kg/m      FHT: 130bpm    Assessment/Plan:   Christianne Geiger is a 31 year old  at 18w2d by LMP c/w 13w2d US, here for return OB visit.    -NOB nl. Pap smear due 2023. Declined influenza and COVID vaccine today. Anatomy ultrasound is ordered today  -Nausea and vomiting: new OB UDS showed THC. Will plan for retesting upon admission for labor, discussed with patient. Rx for Zofran for any continued nausea  -Hx GHTN in first pregnancy. Baseline HELLP labs  nl. Rx for ASA.  -Rubella NI: MMR postpartum    RTC 4 weeks    Irlanda Fournier MD  Office phone: [phone]  Obstetrics and Gynecology  [facility]   2022           "

## 2022-12-06 NOTE — NURSING NOTE
"Initial /67 (BP Location: Right arm, Patient Position: Sitting, Cuff Size: Adult Regular)   Pulse 111   Temp (!) 96.7  F (35.9  C) (Tympanic)   Resp 16   Ht 1.575 m (5' 2\")   Wt 71.6 kg (157 lb 14.4 oz)   LMP 07/31/2022 (Approximate)   BMI 28.88 kg/m   Estimated body mass index is 28.88 kg/m  as calculated from the following:    Height as of this encounter: 1.575 m (5' 2\").    Weight as of this encounter: 71.6 kg (157 lb 14.4 oz). .      "

## 2023-01-18 ENCOUNTER — HOSPITAL ENCOUNTER (OUTPATIENT)
Dept: ULTRASOUND IMAGING | Facility: CLINIC | Age: 33
Discharge: HOME | End: 2023-01-18
Attending: STUDENT IN AN ORGANIZED HEALTH CARE EDUCATION/TRAINING PROGRAM | Admitting: STUDENT IN AN ORGANIZED HEALTH CARE EDUCATION/TRAINING PROGRAM
Payer: COMMERCIAL

## 2023-01-18 DIAGNOSIS — Z34.82 PRENATAL CARE, SUBSEQUENT PREGNANCY IN SECOND TRIMESTER: ICD-10-CM

## 2023-01-18 PROCEDURE — 76805 OB US >/= 14 WKS SNGL FETUS: CPT

## 2023-01-20 ENCOUNTER — E-VISIT (OUTPATIENT)
Dept: URGENT CARE | Facility: CLINIC | Age: 33
End: 2023-01-20
Payer: COMMERCIAL

## 2023-01-20 DIAGNOSIS — B00.1 COLD SORE: Primary | ICD-10-CM

## 2023-01-20 PROCEDURE — 99207 PR NON-BILLABLE SERV PER CHARTING: CPT | Performed by: NURSE PRACTITIONER

## 2023-01-20 NOTE — PATIENT INSTRUCTIONS
Dear Christianne Geiger,    We are sorry you are not feeling well. Based on the responses you provided, it is recommended that you be seen in-person in urgent care so we can better evaluate your symptoms. Please click here to find the nearest urgent care location to you.   You will not be charged for this Visit. Thank you for trusting us with your care.    TITO Harrell CNP

## 2023-01-30 ENCOUNTER — PRENATAL OFFICE VISIT (OUTPATIENT)
Dept: OBGYN | Facility: CLINIC | Age: 33
End: 2023-01-30
Payer: COMMERCIAL

## 2023-01-30 VITALS
TEMPERATURE: 98.6 F | SYSTOLIC BLOOD PRESSURE: 120 MMHG | HEART RATE: 124 BPM | HEIGHT: 62 IN | BODY MASS INDEX: 29.81 KG/M2 | DIASTOLIC BLOOD PRESSURE: 67 MMHG | WEIGHT: 162 LBS | RESPIRATION RATE: 18 BRPM

## 2023-01-30 DIAGNOSIS — Z34.82 PRENATAL CARE, SUBSEQUENT PREGNANCY IN SECOND TRIMESTER: Primary | ICD-10-CM

## 2023-01-30 DIAGNOSIS — B00.2 ORAL HERPES: ICD-10-CM

## 2023-01-30 PROCEDURE — 99207 PR PRENATAL VISIT: CPT | Performed by: STUDENT IN AN ORGANIZED HEALTH CARE EDUCATION/TRAINING PROGRAM

## 2023-01-30 RX ORDER — ACYCLOVIR 400 MG/1
400 TABLET ORAL EVERY 12 HOURS
Qty: 90 TABLET | Refills: 6 | Status: SHIPPED | OUTPATIENT
Start: 2023-01-30 | End: 2024-08-26

## 2023-01-30 NOTE — NURSING NOTE
"Initial /67 (BP Location: Right arm, Patient Position: Chair, Cuff Size: Adult Regular)   Pulse (!) 124   Temp 98.6  F (37  C) (Tympanic)   Resp 18   Ht 1.575 m (5' 2\")   Wt 73.5 kg (162 lb)   LMP 07/31/2022 (Approximate)   BMI 29.63 kg/m   Estimated body mass index is 29.63 kg/m  as calculated from the following:    Height as of this encounter: 1.575 m (5' 2\").    Weight as of this encounter: 73.5 kg (162 lb). .      "

## 2023-01-30 NOTE — PROGRESS NOTES
"[facility]  Return OB Note    Subjective:  Denies vaginal bleeding, loss of fluid, or regular contractions. Noted normal fetal movement.  Complaints today: oral herpes. Denied any vaginal symptoms. Interested in tubal ligation. Has private insurance.    Objective:  /67 (BP Location: Right arm, Patient Position: Chair, Cuff Size: Adult Regular)   Pulse (!) 124   Temp 98.6  F (37  C) (Tympanic)   Resp 18   Ht 1.575 m (5' 2\")   Wt 73.5 kg (162 lb)   LMP 2022 (Approximate)   BMI 29.63 kg/m      Fundal height: 25cm  FHT: 130bpm    Assessment/Plan:   Christianne Geiger is a 32 year old  at 26w1d by LMP c/w 13w2d US, here for return OB visit.     -NOB nl. Pap smear due 2023. Will schedule midtrimester labs with labs within 1-2 weeks.  -Declined influenza and COVID vaccine today.   -Anatomy ultrasound nl  -Nausea and vomiting: new OB UDS showed THC. Will plan for retesting upon admission for labor, discussed with patient. S/p Rx for Zofran   -Hx GHTN in first pregnancy. Baseline HELLP labs  nl. Rx for ASA.  -Rubella NI: MMR postpartum  -Oral herpes: initiate suppressive therapy with acyclovir (400 mg orally twice daily)    RTC 2 weeks    Irlanda Fournier MD  Obstetrics and Gynecology  [facility]   2023           "

## 2023-03-01 ENCOUNTER — PRENATAL OFFICE VISIT (OUTPATIENT)
Dept: OBGYN | Facility: CLINIC | Age: 33
End: 2023-03-01
Payer: COMMERCIAL

## 2023-03-01 VITALS
WEIGHT: 163.2 LBS | TEMPERATURE: 97 F | RESPIRATION RATE: 14 BRPM | HEIGHT: 62 IN | HEART RATE: 134 BPM | DIASTOLIC BLOOD PRESSURE: 75 MMHG | SYSTOLIC BLOOD PRESSURE: 113 MMHG | BODY MASS INDEX: 30.03 KG/M2

## 2023-03-01 DIAGNOSIS — Z3A.30 30 WEEKS GESTATION OF PREGNANCY: Primary | ICD-10-CM

## 2023-03-01 DIAGNOSIS — Z34.82 PRENATAL CARE, SUBSEQUENT PREGNANCY IN SECOND TRIMESTER: ICD-10-CM

## 2023-03-01 LAB
ERYTHROCYTE [DISTWIDTH] IN BLOOD BY AUTOMATED COUNT: 12.4 % (ref 10–15)
GLUCOSE 1H P 50 G GLC PO SERPL-MCNC: 123 MG/DL (ref 70–129)
HCT VFR BLD AUTO: 35 % (ref 35–47)
HGB BLD-MCNC: 11.6 G/DL (ref 11.7–15.7)
MCH RBC QN AUTO: 31.1 PG (ref 26.5–33)
MCHC RBC AUTO-ENTMCNC: 33.1 G/DL (ref 31.5–36.5)
MCV RBC AUTO: 94 FL (ref 78–100)
PLATELET # BLD AUTO: 301 10E3/UL (ref 150–450)
RBC # BLD AUTO: 3.73 10E6/UL (ref 3.8–5.2)
WBC # BLD AUTO: 13.3 10E3/UL (ref 4–11)

## 2023-03-01 PROCEDURE — 86780 TREPONEMA PALLIDUM: CPT | Performed by: OBSTETRICS & GYNECOLOGY

## 2023-03-01 PROCEDURE — 90715 TDAP VACCINE 7 YRS/> IM: CPT | Performed by: OBSTETRICS & GYNECOLOGY

## 2023-03-01 PROCEDURE — 99207 PR PRENATAL VISIT: CPT | Performed by: OBSTETRICS & GYNECOLOGY

## 2023-03-01 PROCEDURE — 82950 GLUCOSE TEST: CPT | Performed by: OBSTETRICS & GYNECOLOGY

## 2023-03-01 PROCEDURE — 36415 COLL VENOUS BLD VENIPUNCTURE: CPT | Performed by: OBSTETRICS & GYNECOLOGY

## 2023-03-01 PROCEDURE — 90471 IMMUNIZATION ADMIN: CPT | Performed by: OBSTETRICS & GYNECOLOGY

## 2023-03-01 PROCEDURE — 85027 COMPLETE CBC AUTOMATED: CPT | Performed by: OBSTETRICS & GYNECOLOGY

## 2023-03-01 NOTE — NURSING NOTE
"Initial /75 (BP Location: Right arm, Patient Position: Chair, Cuff Size: Adult Regular)   Pulse (!) 134   Temp 97  F (36.1  C) (Tympanic)   Resp 14   Ht 1.575 m (5' 2\")   Wt 74 kg (163 lb 3.2 oz)   LMP 07/31/2022 (Approximate)   Breastfeeding No   BMI 29.85 kg/m   Estimated body mass index is 29.85 kg/m  as calculated from the following:    Height as of this encounter: 1.575 m (5' 2\").    Weight as of this encounter: 74 kg (163 lb 3.2 oz). .    Gloria Serna, CHUY    "

## 2023-03-01 NOTE — PROGRESS NOTES
"[facility]    Return OB Note    CC: Return OB     Subjective:  Christianne is a 32 year old  at 30w3d   Denies vaginal bleeding, loss of fluid, or regular contractions. Pos fetal movement. No headache, visual disturbance or RUQ pain.  Complaints today: none    Objective:  /75 (BP Location: Right arm, Patient Position: Chair, Cuff Size: Adult Regular)   Pulse (!) 134   Temp 97  F (36.1  C) (Tympanic)   Resp 14   Ht 1.575 m (5' 2\")   Wt 74 kg (163 lb 3.2 oz)   LMP 2022 (Approximate)   Breastfeeding No   BMI 29.85 kg/m      See flowsheet      Assessment/Plan:       32 year old year old  female with IUP at 30w3d  Encounter Diagnosis   Name Primary?     30 weeks gestation of pregnancy Yes        -NOB nl. Pap smear due 2023.   -Declined influenza and COVID vaccine.   -Anatomy ultrasound nl  -Nausea and vomiting: new OB UDS showed THC. Will plan for retesting upon admission for labor, discussed with patient. S/p Rx for Zofran   -Hx GHTN in first pregnancy. Baseline HELLP labs  nl. Rx for ASA.  -Rubella NI: MMR postpartum  -Oral herpes: initiate suppressive therapy with acyclovir (400 mg orally twice daily)  -has not done 3rd trim labs, orders placed last visit, Glucola done in clinic. Will get lab drawn  -Tdap done today.    Return precautions given  Discussed  labor and preeclampsia precautions.  Discussed fetal movement precautions.    RTC 2 weeks    Pat Sunshine MD  "

## 2023-03-01 NOTE — PROGRESS NOTES
Prior to immunization administration, verified patients identity using patient s name and date of birth. Please see Immunization Activity for additional information.     Screening Questionnaire for Adult Immunization    Are you sick today?   No   Do you have allergies to medications, food, a vaccine component or latex?   No   Have you ever had a serious reaction after receiving a vaccination?   No   Do you have a long-term health problem with heart, lung, kidney, or metabolic disease (e.g., diabetes), asthma, a blood disorder, no spleen, complement component deficiency, a cochlear implant, or a spinal fluid leak?  Are you on long-term aspirin therapy?   No   Do you have cancer, leukemia, HIV/AIDS, or any other immune system problem?   No   Do you have a parent, brother, or sister with an immune system problem?   No   In the past 3 months, have you taken medications that affect  your immune system, such as prednisone, other steroids, or anticancer drugs; drugs for the treatment of rheumatoid arthritis, Crohn s disease, or psoriasis; or have you had radiation treatments?   No   Have you had a seizure, or a brain or other nervous system problem?   No   During the past year, have you received a transfusion of blood or blood    products, or been given immune (gamma) globulin or antiviral drug?   No   For women: Are you pregnant or is there a chance you could become       pregnant during the next month? yes   Have you received any vaccinations in the past 4 weeks?   No     Immunization questionnaire answers were all negative.        Per orders of Dr. Cavanaugh, injection of TDAP given by Gloria Serna. Patient instructed to remain in clinic for 15 minutes afterwards, and to report any adverse reaction to me immediately.       Screening performed by Gloria Serna on 3/1/2023 at 3:09 PM.

## 2023-03-02 LAB — T PALLIDUM AB SER QL: NONREACTIVE

## 2023-03-14 ENCOUNTER — PRENATAL OFFICE VISIT (OUTPATIENT)
Dept: OBGYN | Facility: CLINIC | Age: 33
End: 2023-03-14
Payer: COMMERCIAL

## 2023-03-14 VITALS
TEMPERATURE: 98.2 F | SYSTOLIC BLOOD PRESSURE: 116 MMHG | WEIGHT: 166.7 LBS | DIASTOLIC BLOOD PRESSURE: 70 MMHG | RESPIRATION RATE: 16 BRPM | HEIGHT: 62 IN | BODY MASS INDEX: 30.67 KG/M2 | HEART RATE: 126 BPM

## 2023-03-14 DIAGNOSIS — N76.0 BV (BACTERIAL VAGINOSIS): ICD-10-CM

## 2023-03-14 DIAGNOSIS — Z34.83 ENCOUNTER FOR SUPERVISION OF OTHER NORMAL PREGNANCY IN THIRD TRIMESTER: Primary | ICD-10-CM

## 2023-03-14 DIAGNOSIS — B96.89 BV (BACTERIAL VAGINOSIS): ICD-10-CM

## 2023-03-14 PROCEDURE — 99207 PR PRENATAL VISIT: CPT | Performed by: OBSTETRICS & GYNECOLOGY

## 2023-03-14 RX ORDER — METRONIDAZOLE 500 MG/1
500 TABLET ORAL 2 TIMES DAILY
Qty: 14 TABLET | Refills: 0 | Status: SHIPPED | OUTPATIENT
Start: 2023-03-14 | End: 2023-03-21

## 2023-03-14 NOTE — PROGRESS NOTES
Concerns: Hx of BV in the past  Vaginal discharge  No vaginal bleeding, LOF.  No contractions.  Cephalic position confirmed by Leopold maneuvers.  Discussed kick counts and fetal movement.  Discussed PTL, PROM, and when to call or come in.  RTC 2 weeks.    Bright Calvo MD

## 2023-04-11 ENCOUNTER — PRENATAL OFFICE VISIT (OUTPATIENT)
Dept: OBGYN | Facility: CLINIC | Age: 33
End: 2023-04-11
Payer: COMMERCIAL

## 2023-04-11 VITALS
SYSTOLIC BLOOD PRESSURE: 124 MMHG | HEART RATE: 113 BPM | HEIGHT: 62 IN | WEIGHT: 164 LBS | RESPIRATION RATE: 18 BRPM | BODY MASS INDEX: 30.18 KG/M2 | TEMPERATURE: 99.2 F | DIASTOLIC BLOOD PRESSURE: 70 MMHG

## 2023-04-11 DIAGNOSIS — Z87.59 HISTORY OF GESTATIONAL HYPERTENSION: ICD-10-CM

## 2023-04-11 DIAGNOSIS — Z34.80 PRENATAL CARE OF MULTIGRAVIDA, ANTEPARTUM: Primary | ICD-10-CM

## 2023-04-11 DIAGNOSIS — O26.843 UTERINE SIZE-DATE DISCREPANCY IN THIRD TRIMESTER: ICD-10-CM

## 2023-04-11 DIAGNOSIS — Z28.39 RUBELLA NON-IMMUNE STATUS, ANTEPARTUM: ICD-10-CM

## 2023-04-11 DIAGNOSIS — O09.899 RUBELLA NON-IMMUNE STATUS, ANTEPARTUM: ICD-10-CM

## 2023-04-11 PROCEDURE — 87653 STREP B DNA AMP PROBE: CPT | Performed by: STUDENT IN AN ORGANIZED HEALTH CARE EDUCATION/TRAINING PROGRAM

## 2023-04-11 PROCEDURE — 99207 PR PRENATAL VISIT: CPT | Performed by: STUDENT IN AN ORGANIZED HEALTH CARE EDUCATION/TRAINING PROGRAM

## 2023-04-11 NOTE — NURSING NOTE
"Initial /70 (BP Location: Right arm, Patient Position: Chair, Cuff Size: Adult Regular)   Pulse 113   Temp 99.2  F (37.3  C) (Tympanic)   Resp 18   Ht 1.575 m (5' 2\")   Wt 74.4 kg (164 lb)   LMP 07/31/2022 (Approximate)   BMI 30.00 kg/m   Estimated body mass index is 30 kg/m  as calculated from the following:    Height as of this encounter: 1.575 m (5' 2\").    Weight as of this encounter: 74.4 kg (164 lb). .      "

## 2023-04-11 NOTE — PROGRESS NOTES
"[facility]  Return OB Note    Subjective:  Denies vaginal bleeding, loss of fluid, or regular contractions. Noted normal fetal movement.  Complaints today: none    Objective:  /70 (BP Location: Right arm, Patient Position: Chair, Cuff Size: Adult Regular)   Pulse 113   Temp 99.2  F (37.3  C) (Tympanic)   Resp 18   Ht 1.575 m (5' 2\")   Wt 74.4 kg (164 lb)   LMP 2022 (Approximate)   BMI 30.00 kg/m      Fundal height: 33cm  FHT: 130bpm  SVE: 2/50/-3    Assessment/Plan:   Christianne Geiger is a 32 year old  at 36w2d by LMP c/w 13w2d US, here for return OB visit.     -NOB and midtrimester labs nl. Pap smear due 2023. GBS obtained today  -Declined influenza and COVID vaccine today. S/p Tdap vaccine  -Anatomy ultrasound nl  -Hx GHTN in first pregnancy. Baseline HELLP labs  nl. Rx for ASA.  -Rubella NI: MMR postpartum  -Oral herpes: initiate suppressive therapy with acyclovir (400 mg orally twice daily)  -S<D: growth US ordered    RTC 1 weeks    Irlanda Fournier MD  Obstetrics and Gynecology  [facility]   2023           "

## 2023-04-12 LAB — GP B STREP DNA SPEC QL NAA+PROBE: NEGATIVE

## 2023-04-19 ENCOUNTER — PRENATAL OFFICE VISIT (OUTPATIENT)
Dept: OBGYN | Facility: CLINIC | Age: 33
End: 2023-04-19
Payer: COMMERCIAL

## 2023-04-19 VITALS
BODY MASS INDEX: 30.55 KG/M2 | SYSTOLIC BLOOD PRESSURE: 126 MMHG | HEIGHT: 62 IN | TEMPERATURE: 97.8 F | RESPIRATION RATE: 18 BRPM | WEIGHT: 166 LBS | HEART RATE: 108 BPM | DIASTOLIC BLOOD PRESSURE: 69 MMHG

## 2023-04-19 DIAGNOSIS — Z87.59 HISTORY OF GESTATIONAL HYPERTENSION: ICD-10-CM

## 2023-04-19 DIAGNOSIS — Z34.80 PRENATAL CARE OF MULTIGRAVIDA, ANTEPARTUM: Primary | ICD-10-CM

## 2023-04-19 DIAGNOSIS — O09.899 RUBELLA NON-IMMUNE STATUS, ANTEPARTUM: ICD-10-CM

## 2023-04-19 DIAGNOSIS — Z28.39 RUBELLA NON-IMMUNE STATUS, ANTEPARTUM: ICD-10-CM

## 2023-04-19 DIAGNOSIS — O26.843 UTERINE SIZE-DATE DISCREPANCY IN THIRD TRIMESTER: ICD-10-CM

## 2023-04-19 PROCEDURE — 99207 PR PRENATAL VISIT: CPT | Performed by: STUDENT IN AN ORGANIZED HEALTH CARE EDUCATION/TRAINING PROGRAM

## 2023-04-19 NOTE — PROGRESS NOTES
"[facility]  Return OB Note    Subjective:  Denies vaginal bleeding, loss of fluid, or regular contractions. Noted normal fetal movement.  Complaints today: having irregular painful contractions    Objective:  /69 (BP Location: Left arm, Patient Position: Chair, Cuff Size: Adult Regular)   Pulse 108   Temp 97.8  F (36.6  C) (Tympanic)   Resp 18   Ht 1.575 m (5' 2\")   Wt 75.3 kg (166 lb)   LMP 2022 (Approximate)   BMI 30.36 kg/m      Fundal height: 34-35cm  FHT: 130bpm  SVE: /-3  Bedside US: cephalic    Assessment/Plan:   Christianne Geiger is a 32 year old  at 37w3d by LMP c/w 13w2d US, here for return OB visit.     -NOB labs noted for Rubella NI - MMR vaccine postpartum. Midtrimester labs nl. Pap smear due 2023. GBS neg  -Declined influenza and COVID vaccine. S/p Tdap vaccine  -Anatomy ultrasound nl  -Hx GHTN in first pregnancy. Baseline HELLP labs  nl. Rx for ASA.  -Oral herpes: initiate suppressive therapy with acyclovir (400 mg orally twice daily)  -S<D: with appropriate fundal height growth, will defer growth US at this time.  -THC use: retest on admission for labor.    RTC 1 weeks    Irlanda Fournier MD  Obstetrics and Gynecology  [facility]   2023           "

## 2023-04-19 NOTE — NURSING NOTE
"Initial /69 (BP Location: Left arm, Patient Position: Chair, Cuff Size: Adult Regular)   Pulse 108   Temp 97.8  F (36.6  C) (Tympanic)   Resp 18   Ht 1.575 m (5' 2\")   Wt 75.3 kg (166 lb)   LMP 07/31/2022 (Approximate)   BMI 30.36 kg/m   Estimated body mass index is 30.36 kg/m  as calculated from the following:    Height as of this encounter: 1.575 m (5' 2\").    Weight as of this encounter: 75.3 kg (166 lb). .      "

## 2023-04-25 ENCOUNTER — PRENATAL OFFICE VISIT (OUTPATIENT)
Dept: OBGYN | Facility: CLINIC | Age: 33
End: 2023-04-25
Payer: COMMERCIAL

## 2023-04-25 VITALS
SYSTOLIC BLOOD PRESSURE: 100 MMHG | DIASTOLIC BLOOD PRESSURE: 61 MMHG | HEART RATE: 107 BPM | HEIGHT: 62 IN | BODY MASS INDEX: 30.18 KG/M2 | WEIGHT: 164 LBS | RESPIRATION RATE: 16 BRPM | TEMPERATURE: 98.9 F

## 2023-04-25 DIAGNOSIS — Z34.83 PRENATAL CARE, SUBSEQUENT PREGNANCY IN THIRD TRIMESTER: Primary | ICD-10-CM

## 2023-04-25 DIAGNOSIS — Z87.59 HISTORY OF GESTATIONAL HYPERTENSION: ICD-10-CM

## 2023-04-25 PROCEDURE — 99207 PR PRENATAL VISIT: CPT | Performed by: OBSTETRICS & GYNECOLOGY

## 2023-04-25 NOTE — NURSING NOTE
"Initial /61 (BP Location: Left arm, Patient Position: Chair, Cuff Size: Adult Regular)   Pulse 107   Temp 98.9  F (37.2  C) (Tympanic)   Resp 16   Ht 1.575 m (5' 2\")   Wt 74.4 kg (164 lb)   LMP 07/31/2022 (Approximate)   BMI 30.00 kg/m   Estimated body mass index is 30 kg/m  as calculated from the following:    Height as of this encounter: 1.575 m (5' 2\").    Weight as of this encounter: 74.4 kg (164 lb). .      "

## 2023-04-25 NOTE — PROGRESS NOTES
"[facility]    Return OB Note    CC: Return OB     Subjective:  Christianne is a 32 year old  at 38w2d   Denies vaginal bleeding, loss of fluid, or regular contractions. Good fetal movement.  Complaints today: None    Objective:  /61 (BP Location: Left arm, Patient Position: Chair, Cuff Size: Adult Regular)   Pulse 107   Temp 98.9  F (37.2  C) (Tympanic)   Resp 16   Ht 1.575 m (5' 2\")   Wt 74.4 kg (164 lb)   LMP 2022 (Approximate)   BMI 30.00 kg/m      Fundal height: 35cm  FHT: 140bpm  SVE: /    Assessment/Plan:   Encounter Diagnoses   Name Primary?     Prenatal care, subsequent pregnancy in third trimester Yes     History of gestational hypertension        IUP at 38w2d  -NOB labs noted for Rubella NI - MMR vaccine postpartum. Midtrimester labs nl. Pap smear due 2023. GBS neg  -Declined influenza and COVID vaccine. S/p Tdap vaccine  -Anatomy ultrasound nl  -Hx GHTN in first pregnancy. Baseline HELLP labs  nl. Rx for ASA.  -Oral herpes: initiate suppressive therapy with acyclovir (400 mg orally twice daily)  -S<D: declines growth US, discussed risk of IUGR  -THC use: retest on admission for labor.  -Strict return precautions given    RTC 1 weeks    Shanice Smith DO"

## 2023-04-26 ENCOUNTER — TELEPHONE (OUTPATIENT)
Dept: OBGYN | Facility: CLINIC | Age: 33
End: 2023-04-26

## 2023-04-26 NOTE — TELEPHONE ENCOUNTER
Completed paperwork was given to Shanice Smith DO to sign.    -Danni Paez  [facility] [place]

## 2023-04-27 NOTE — TELEPHONE ENCOUNTER
Completed paperwork was faxed and mailed.  Will keep a copy up front for a few weeks and then send it to be scanned into chart.    -Danni CORLEY [facility]  [facility] [place]

## 2023-04-28 ENCOUNTER — HOSPITAL ENCOUNTER (INPATIENT)
Facility: CLINIC | Age: 33
LOS: 2 days | Discharge: HOME | End: 2023-04-30
Attending: STUDENT IN AN ORGANIZED HEALTH CARE EDUCATION/TRAINING PROGRAM | Admitting: STUDENT IN AN ORGANIZED HEALTH CARE EDUCATION/TRAINING PROGRAM
Payer: COMMERCIAL

## 2023-04-28 PROBLEM — Z36.89 ENCOUNTER FOR TRIAGE IN PREGNANT PATIENT: Status: ACTIVE | Noted: 2023-04-28

## 2023-04-28 LAB
ABO/RH(D): NORMAL
AMPHETAMINES UR QL SCN: NORMAL
ANTIBODY SCREEN: NEGATIVE
BARBITURATES UR QL SCN: NORMAL
BENZODIAZ UR QL SCN: NORMAL
BZE UR QL SCN: NORMAL
CANNABINOIDS UR QL SCN: NORMAL
CRYSTALS AMN MICRO: NORMAL
ERYTHROCYTE [DISTWIDTH] IN BLOOD BY AUTOMATED COUNT: 12.9 % (ref 10–15)
HCT VFR BLD AUTO: 32.7 % (ref 35–47)
HGB BLD-MCNC: 11.3 G/DL (ref 11.7–15.7)
MCH RBC QN AUTO: 31 PG (ref 26.5–33)
MCHC RBC AUTO-ENTMCNC: 34.6 G/DL (ref 31.5–36.5)
MCV RBC AUTO: 90 FL (ref 78–100)
OPIATES UR QL SCN: NORMAL
PCP QUAL URINE (ROCHE): NORMAL
PLATELET # BLD AUTO: 324 10E3/UL (ref 150–450)
RBC # BLD AUTO: 3.64 10E6/UL (ref 3.8–5.2)
RUPTURE OF FETAL MEMBRANES BY ROM PLUS: POSITIVE
SPECIMEN EXPIRATION DATE: NORMAL
WBC # BLD AUTO: 13.2 10E3/UL (ref 4–11)

## 2023-04-28 PROCEDURE — 86780 TREPONEMA PALLIDUM: CPT | Performed by: STUDENT IN AN ORGANIZED HEALTH CARE EDUCATION/TRAINING PROGRAM

## 2023-04-28 PROCEDURE — 80307 DRUG TEST PRSMV CHEM ANLYZR: CPT | Performed by: STUDENT IN AN ORGANIZED HEALTH CARE EDUCATION/TRAINING PROGRAM

## 2023-04-28 PROCEDURE — 250N000013 HC RX MED GY IP 250 OP 250 PS 637: Performed by: STUDENT IN AN ORGANIZED HEALTH CARE EDUCATION/TRAINING PROGRAM

## 2023-04-28 PROCEDURE — 84112 EVAL AMNIOTIC FLUID PROTEIN: CPT | Performed by: STUDENT IN AN ORGANIZED HEALTH CARE EDUCATION/TRAINING PROGRAM

## 2023-04-28 PROCEDURE — 120N000001 HC R&B MED SURG/OB

## 2023-04-28 PROCEDURE — 86900 BLOOD TYPING SEROLOGIC ABO: CPT | Performed by: STUDENT IN AN ORGANIZED HEALTH CARE EDUCATION/TRAINING PROGRAM

## 2023-04-28 PROCEDURE — 85027 COMPLETE CBC AUTOMATED: CPT | Performed by: STUDENT IN AN ORGANIZED HEALTH CARE EDUCATION/TRAINING PROGRAM

## 2023-04-28 RX ORDER — CARBOPROST TROMETHAMINE 250 UG/ML
250 INJECTION, SOLUTION INTRAMUSCULAR
Status: DISCONTINUED | OUTPATIENT
Start: 2023-04-28 | End: 2023-04-29 | Stop reason: HOSPADM

## 2023-04-28 RX ORDER — HYDROXYZINE HYDROCHLORIDE 50 MG/1
50 TABLET, FILM COATED ORAL
Status: DISCONTINUED | OUTPATIENT
Start: 2023-04-28 | End: 2023-04-29 | Stop reason: HOSPADM

## 2023-04-28 RX ORDER — METOCLOPRAMIDE HYDROCHLORIDE 5 MG/ML
10 INJECTION INTRAMUSCULAR; INTRAVENOUS EVERY 6 HOURS PRN
Status: DISCONTINUED | OUTPATIENT
Start: 2023-04-28 | End: 2023-04-29 | Stop reason: HOSPADM

## 2023-04-28 RX ORDER — PROCHLORPERAZINE 25 MG
25 SUPPOSITORY, RECTAL RECTAL EVERY 12 HOURS PRN
Status: DISCONTINUED | OUTPATIENT
Start: 2023-04-28 | End: 2023-04-29 | Stop reason: HOSPADM

## 2023-04-28 RX ORDER — ACETAMINOPHEN 325 MG/1
650 TABLET ORAL EVERY 4 HOURS PRN
Status: DISCONTINUED | OUTPATIENT
Start: 2023-04-28 | End: 2023-04-29 | Stop reason: HOSPADM

## 2023-04-28 RX ORDER — LIDOCAINE 40 MG/G
CREAM TOPICAL
Status: DISCONTINUED | OUTPATIENT
Start: 2023-04-28 | End: 2023-04-28

## 2023-04-28 RX ORDER — OXYTOCIN 10 [USP'U]/ML
10 INJECTION, SOLUTION INTRAMUSCULAR; INTRAVENOUS
Status: DISCONTINUED | OUTPATIENT
Start: 2023-04-28 | End: 2023-04-29 | Stop reason: HOSPADM

## 2023-04-28 RX ORDER — ONDANSETRON 2 MG/ML
4 INJECTION INTRAMUSCULAR; INTRAVENOUS EVERY 6 HOURS PRN
Status: DISCONTINUED | OUTPATIENT
Start: 2023-04-28 | End: 2023-04-29 | Stop reason: HOSPADM

## 2023-04-28 RX ORDER — METOCLOPRAMIDE 10 MG/1
10 TABLET ORAL EVERY 6 HOURS PRN
Status: DISCONTINUED | OUTPATIENT
Start: 2023-04-28 | End: 2023-04-29 | Stop reason: HOSPADM

## 2023-04-28 RX ORDER — KETOROLAC TROMETHAMINE 30 MG/ML
30 INJECTION, SOLUTION INTRAMUSCULAR; INTRAVENOUS
Status: DISCONTINUED | OUTPATIENT
Start: 2023-04-28 | End: 2023-04-30 | Stop reason: HOSPADM

## 2023-04-28 RX ORDER — MISOPROSTOL 200 UG/1
400 TABLET ORAL
Status: DISCONTINUED | OUTPATIENT
Start: 2023-04-28 | End: 2023-04-29 | Stop reason: HOSPADM

## 2023-04-28 RX ORDER — NALOXONE HYDROCHLORIDE 0.4 MG/ML
0.4 INJECTION, SOLUTION INTRAMUSCULAR; INTRAVENOUS; SUBCUTANEOUS
Status: DISCONTINUED | OUTPATIENT
Start: 2023-04-28 | End: 2023-04-29 | Stop reason: HOSPADM

## 2023-04-28 RX ORDER — PROCHLORPERAZINE MALEATE 10 MG
10 TABLET ORAL EVERY 6 HOURS PRN
Status: DISCONTINUED | OUTPATIENT
Start: 2023-04-28 | End: 2023-04-29 | Stop reason: HOSPADM

## 2023-04-28 RX ORDER — OXYTOCIN/0.9 % SODIUM CHLORIDE 30/500 ML
340 PLASTIC BAG, INJECTION (ML) INTRAVENOUS CONTINUOUS PRN
Status: DISCONTINUED | OUTPATIENT
Start: 2023-04-28 | End: 2023-04-29 | Stop reason: HOSPADM

## 2023-04-28 RX ORDER — ONDANSETRON 4 MG/1
4 TABLET, ORALLY DISINTEGRATING ORAL EVERY 6 HOURS PRN
Status: DISCONTINUED | OUTPATIENT
Start: 2023-04-28 | End: 2023-04-29 | Stop reason: HOSPADM

## 2023-04-28 RX ORDER — OXYTOCIN/0.9 % SODIUM CHLORIDE 30/500 ML
100-340 PLASTIC BAG, INJECTION (ML) INTRAVENOUS CONTINUOUS PRN
Status: DISCONTINUED | OUTPATIENT
Start: 2023-04-28 | End: 2023-04-30 | Stop reason: HOSPADM

## 2023-04-28 RX ORDER — MISOPROSTOL 100 UG/1
25 TABLET ORAL
Status: DISCONTINUED | OUTPATIENT
Start: 2023-04-28 | End: 2023-04-29 | Stop reason: HOSPADM

## 2023-04-28 RX ORDER — OXYTOCIN/0.9 % SODIUM CHLORIDE 30/500 ML
1-24 PLASTIC BAG, INJECTION (ML) INTRAVENOUS CONTINUOUS
Status: DISCONTINUED | OUTPATIENT
Start: 2023-04-28 | End: 2023-04-29 | Stop reason: HOSPADM

## 2023-04-28 RX ORDER — SODIUM CHLORIDE, SODIUM LACTATE, POTASSIUM CHLORIDE, CALCIUM CHLORIDE 600; 310; 30; 20 MG/100ML; MG/100ML; MG/100ML; MG/100ML
INJECTION, SOLUTION INTRAVENOUS CONTINUOUS PRN
Status: DISCONTINUED | OUTPATIENT
Start: 2023-04-28 | End: 2023-04-29 | Stop reason: HOSPADM

## 2023-04-28 RX ORDER — LIDOCAINE 40 MG/G
CREAM TOPICAL
Status: DISCONTINUED | OUTPATIENT
Start: 2023-04-28 | End: 2023-04-29 | Stop reason: HOSPADM

## 2023-04-28 RX ORDER — METHYLERGONOVINE MALEATE 0.2 MG/ML
200 INJECTION INTRAVENOUS
Status: DISCONTINUED | OUTPATIENT
Start: 2023-04-28 | End: 2023-04-29 | Stop reason: HOSPADM

## 2023-04-28 RX ORDER — ACYCLOVIR 200 MG/1
400 CAPSULE ORAL 2 TIMES DAILY
Status: DISCONTINUED | OUTPATIENT
Start: 2023-04-28 | End: 2023-04-28

## 2023-04-28 RX ORDER — TRANEXAMIC ACID 10 MG/ML
1 INJECTION, SOLUTION INTRAVENOUS EVERY 30 MIN PRN
Status: DISCONTINUED | OUTPATIENT
Start: 2023-04-28 | End: 2023-04-29 | Stop reason: HOSPADM

## 2023-04-28 RX ORDER — CITRIC ACID/SODIUM CITRATE 334-500MG
30 SOLUTION, ORAL ORAL
Status: DISCONTINUED | OUTPATIENT
Start: 2023-04-28 | End: 2023-04-29 | Stop reason: HOSPADM

## 2023-04-28 RX ORDER — MISOPROSTOL 200 UG/1
800 TABLET ORAL
Status: DISCONTINUED | OUTPATIENT
Start: 2023-04-28 | End: 2023-04-29 | Stop reason: HOSPADM

## 2023-04-28 RX ORDER — NALOXONE HYDROCHLORIDE 0.4 MG/ML
0.2 INJECTION, SOLUTION INTRAMUSCULAR; INTRAVENOUS; SUBCUTANEOUS
Status: DISCONTINUED | OUTPATIENT
Start: 2023-04-28 | End: 2023-04-29 | Stop reason: HOSPADM

## 2023-04-28 RX ORDER — ACYCLOVIR 200 MG/1
400 CAPSULE ORAL 2 TIMES DAILY
Status: DISCONTINUED | OUTPATIENT
Start: 2023-04-28 | End: 2023-04-30 | Stop reason: HOSPADM

## 2023-04-28 RX ORDER — OXYTOCIN 10 [USP'U]/ML
10 INJECTION, SOLUTION INTRAMUSCULAR; INTRAVENOUS
Status: DISCONTINUED | OUTPATIENT
Start: 2023-04-28 | End: 2023-04-30 | Stop reason: HOSPADM

## 2023-04-28 RX ORDER — IBUPROFEN 800 MG/1
800 TABLET, FILM COATED ORAL
Status: DISCONTINUED | OUTPATIENT
Start: 2023-04-28 | End: 2023-04-30 | Stop reason: HOSPADM

## 2023-04-28 RX ADMIN — MISOPROSTOL 25 MCG: 100 TABLET ORAL at 22:38

## 2023-04-28 RX ADMIN — MISOPROSTOL 25 MCG: 100 TABLET ORAL at 18:36

## 2023-04-28 RX ADMIN — ACYCLOVIR 400 MG: 200 CAPSULE ORAL at 17:27

## 2023-04-28 RX ADMIN — ACYCLOVIR 400 MG: 200 CAPSULE ORAL at 22:37

## 2023-04-28 RX ADMIN — MISOPROSTOL 25 MCG: 100 TABLET ORAL at 16:16

## 2023-04-28 RX ADMIN — MISOPROSTOL 25 MCG: 100 TABLET ORAL at 20:38

## 2023-04-28 ASSESSMENT — ACTIVITIES OF DAILY LIVING (ADL)
ADLS_ACUITY_SCORE: 18
ADLS_ACUITY_SCORE: 35
ADLS_ACUITY_SCORE: 18

## 2023-04-28 NOTE — H&P
[facility]  OB History and Physical      Name: Christianne Geiger MRN#: [id number]   Age: 32 year old YOB: 1990      Assessment and Plan:   Christianne Geiger is a 32 year old , at 38w5d by LMP c/w 13w2d, who presents with rupture of membrane.    Prelabor rupture of membrane   -With patient's water has been broken for more than 24 hours now and her cervix not changing compared to  clinic exam, we will start oral misoprostol to help with labor augmentation.  I elected to proceed with misoprostol instead of IV Pitocin because her cervix is still long.  We will reevaluate cervix after several doses of misoprostol and switch to Pitocin.  -GBS negative.  -Patient desires epidural    THC use in pregnancy: admission UDS obtained per hospital policy  Rubella NI: MMR vaccine after delivery.   FWB: Cat I tracing. Continuous Fetal Monitoring    Irlanda Fournier MD  Obstetrics and Gynecology  [facility]   2023    HPI:   Patient noticed a sudden gush of fluid yesterday at around 9 AM and continued ongoing leakage of fluid since then.  Notes normal fetal movement.  Denied vaginal bleeding.  Denied regular painful contractions       ROS:   Complete 10-point ROS negative except as noted in HPI.       OB History:     Pregnancy Complications:  -THC use in pregnancy  -Quit smoking 4 days ago  -Rubella nonimmune  -Oral herpes: On suppressive therapy with acyclovir 400 mg twice per day  -S<D  - history of gestational hypertension  OB History    Para Term  AB Living   3 2 2 0 0 2   SAB IAB Ectopic Multiple Live Births   0 0 0 0 2      # Outcome Date GA Lbr Jeremy/2nd Weight Sex Delivery Anes PTL Lv   3 Current            2 Term 21 38w2d 10:31 / 00:17 2.92 kg (6 lb 7 oz) M Vag-Spont EPI N GURPREET      Name: Spencer      Apgar1: 8  Apgar5: 9   1 Term 19 38w2d 13:40 / 03:45 3.04 kg (6 lb 11.2 oz) F Vag-Spont EPI N GURPREET      Complications: GBS,  Preeclampsia/Hypertension      Name: Sandy      Apgar1: 9  Apgar5: 9      Obstetric Comments   #1 IOL for gestational HTN          Past Medical History:   Denied personal history of asthma and chronic hypertension.  Past Medical History:   Diagnosis Date     Chlamydia      Pregnancy induced hypertension 2019          Past Surgical History:   Denied personal history of abdominal surgical history.  Past Surgical History:   Procedure Laterality Date     SURGICAL HISTORY OF -   2000    T & A     SURGICAL HISTORY OF -   2001    Left eye surgery          Social History:     Social History     Tobacco Use     Smoking status: Every Day     Packs/day: 0.50     Types: Cigarettes     Smokeless tobacco: Never     Tobacco comments:     down to 8-10 cig/day with pregnancy   Vaping Use     Vaping status: Never Used   Substance Use Topics     Alcohol use: Not Currently     Comment: rare- quit with pregnancy          Family History:   Denied family history of bleeding/clotting disorder or adverse reaction to anesthesia.  Family History   Problem Relation Age of Onset     Cancer Mother         Cervical     Irritable Bowel Syndrome Mother      Depression Mother      Brain Tumor Mother         benign     Respiratory Father         Asthma     Diabetes Father         type II     Respiratory Brother         Asthma     Gastrointestinal Disease Brother         Reflux     Kidney Disease Maternal Grandmother      Alcohol/Drug Maternal Grandfather         alcohol     Lung Cancer Paternal Grandmother      Respiratory Paternal Grandfather         Asthma     Diabetes Paternal Grandfather      Cancer Paternal Grandfather           Immunizations:     Immunization History   Administered Date(s) Administered     Flu, Unspecified 09/29/2010     HEPA 04/09/2009, 08/28/2015     HIB (PRP-T) 02/10/1993, 04/07/1993, 06/09/1993, 03/30/1994     HPV 05/31/2007, 07/31/2007, 11/30/2007     HepA-Peds, Unspecified 03/16/2011     HepB 02/10/1993, 06/29/2005,  09/01/2005     Historical DTP/aP 02/10/1993, 04/07/1993, 06/09/1993, 06/15/1994, 04/15/1998     Influenza (IIV3) PF 11/30/2007, 11/04/2008     Influenza Vaccine >6 months (Alfuria,Fluzone) 09/26/2018     MMR 03/30/1994, 04/15/1998     OPV, trivalent, live 02/10/1993, 04/07/1993, 06/15/1994, 04/15/1998     TD,PF 7+ (Tenivac) 06/29/2005     TDAP (Adacel,Boostrix) 03/01/2023     TDAP Vaccine (Adacel) 09/29/2010, 08/28/2015, 01/30/2019     Varicella 04/15/1998, 10/15/2008          Allergies:   No Known Allergies       Medications:     Medications Prior to Admission   Medication Sig Dispense Refill Last Dose     acyclovir (ZOVIRAX) 400 MG tablet Take 1 tablet (400 mg) by mouth every 12 hours 90 tablet 6 4/27/2023 at 2200     Prenatal Vit-Fe Fumarate-FA (PRENATAL MULTIVITAMIN W/IRON) 27-0.8 MG tablet Take 1 tablet by mouth daily   4/28/2023     aspirin (ASA) 81 MG EC tablet Take 1 tablet (81 mg) by mouth daily (Patient not taking: Reported on 4/25/2023) 90 tablet 1      ondansetron (ZOFRAN ODT) 4 MG ODT tab Take 1 tablet (4 mg) by mouth every 8 hours as needed for nausea (Patient not taking: Reported on 3/1/2023) 10 tablet 1           PE:   Vit:   Patient Vitals for the past 4 hrs:   BP Temp Temp src Resp   04/28/23 1325 126/84 98.6  F (37  C) Oral 18      Gen: Well-appearing, NAD, comfortable   CV: Well perfused   Pulm:  normal respiratory efforts  Abd: Soft, gravid, non-tender  Ext: LE edema b/l  Cx: 2/20/-2, mid position, medium texture    FHT: Baseline 125, moderate variability, accelerations present, no decelerations   [place]: 0 contractions in 10 minutes           Labs/Imagings:     Recent Results (from the past 24 hour(s))   Rupture of Fetal Membranes by ROM Plus    Collection Time: 04/28/23  1:28 PM   Result Value Ref Range    Rupture of Fetal Membranes by ROM Plus Positive (A) Negative, Invalid, Suggest Repeat   Fern Test for Rupture of Membranes    Collection Time: 04/28/23  1:29 PM   Result Value Ref Range     Fern Crystallization No ferning present No ferning present

## 2023-04-28 NOTE — PROGRESS NOTES
S: Admit to Inpatient   A: A:moderate variablility, + accels, no decels, Category I  no uterine activity  2 cm, long and thick per Dr. Fournier  Membrane status; SROM  Admission on 04/28/2023   Component Date Value     Rupture of Fetal Membran* 04/28/2023 Positive (A)      Fern Crystallization 04/28/2023 No ferning present      Dr. Fournier informed of above and admission orders received for miso to augment pt labor.   R: Plan of care reviewed with patient. Oriented to room. Reviewed resource binder, [facility] book and paperwork to complete.

## 2023-04-29 ENCOUNTER — ANESTHESIA (OUTPATIENT)
Dept: OBGYN | Facility: CLINIC | Age: 33
End: 2023-04-29

## 2023-04-29 ENCOUNTER — ANESTHESIA EVENT (OUTPATIENT)
Dept: OBGYN | Facility: CLINIC | Age: 33
End: 2023-04-29

## 2023-04-29 LAB — T PALLIDUM AB SER QL: NONREACTIVE

## 2023-04-29 PROCEDURE — 3E033VJ INTRODUCTION OF OTHER HORMONE INTO PERIPHERAL VEIN, PERCUTANEOUS APPROACH: ICD-10-PCS | Performed by: OBSTETRICS & GYNECOLOGY

## 2023-04-29 PROCEDURE — 370N000003 HC ANESTHESIA WARD SERVICE: Performed by: NURSE ANESTHETIST, CERTIFIED REGISTERED

## 2023-04-29 PROCEDURE — 250N000009 HC RX 250: Performed by: STUDENT IN AN ORGANIZED HEALTH CARE EDUCATION/TRAINING PROGRAM

## 2023-04-29 PROCEDURE — 258N000003 HC RX IP 258 OP 636: Performed by: STUDENT IN AN ORGANIZED HEALTH CARE EDUCATION/TRAINING PROGRAM

## 2023-04-29 PROCEDURE — 59400 OBSTETRICAL CARE: CPT | Performed by: OBSTETRICS & GYNECOLOGY

## 2023-04-29 PROCEDURE — 00HU33Z INSERTION OF INFUSION DEVICE INTO SPINAL CANAL, PERCUTANEOUS APPROACH: ICD-10-PCS | Performed by: OBSTETRICS & GYNECOLOGY

## 2023-04-29 PROCEDURE — 250N000013 HC RX MED GY IP 250 OP 250 PS 637: Performed by: OBSTETRICS & GYNECOLOGY

## 2023-04-29 PROCEDURE — 120N000001 HC R&B MED SURG/OB

## 2023-04-29 PROCEDURE — 250N000011 HC RX IP 250 OP 636: Performed by: NURSE ANESTHETIST, CERTIFIED REGISTERED

## 2023-04-29 PROCEDURE — 3E0R3BZ INTRODUCTION OF ANESTHETIC AGENT INTO SPINAL CANAL, PERCUTANEOUS APPROACH: ICD-10-PCS | Performed by: OBSTETRICS & GYNECOLOGY

## 2023-04-29 PROCEDURE — 250N000013 HC RX MED GY IP 250 OP 250 PS 637: Performed by: STUDENT IN AN ORGANIZED HEALTH CARE EDUCATION/TRAINING PROGRAM

## 2023-04-29 PROCEDURE — 722N000001 HC LABOR CARE VAGINAL DELIVERY SINGLE

## 2023-04-29 RX ORDER — MISOPROSTOL 200 UG/1
400 TABLET ORAL
Status: DISCONTINUED | OUTPATIENT
Start: 2023-04-29 | End: 2023-04-30 | Stop reason: HOSPADM

## 2023-04-29 RX ORDER — IBUPROFEN 800 MG/1
800 TABLET, FILM COATED ORAL EVERY 6 HOURS PRN
Status: DISCONTINUED | OUTPATIENT
Start: 2023-04-29 | End: 2023-04-30 | Stop reason: HOSPADM

## 2023-04-29 RX ORDER — METHYLERGONOVINE MALEATE 0.2 MG/ML
200 INJECTION INTRAVENOUS
Status: DISCONTINUED | OUTPATIENT
Start: 2023-04-29 | End: 2023-04-30 | Stop reason: HOSPADM

## 2023-04-29 RX ORDER — DOCUSATE SODIUM 100 MG/1
100 CAPSULE, LIQUID FILLED ORAL DAILY
Status: DISCONTINUED | OUTPATIENT
Start: 2023-04-29 | End: 2023-04-30 | Stop reason: HOSPADM

## 2023-04-29 RX ORDER — CARBOPROST TROMETHAMINE 250 UG/ML
250 INJECTION, SOLUTION INTRAMUSCULAR
Status: DISCONTINUED | OUTPATIENT
Start: 2023-04-29 | End: 2023-04-30 | Stop reason: HOSPADM

## 2023-04-29 RX ORDER — EPHEDRINE SULFATE 50 MG/ML
5 INJECTION, SOLUTION INTRAMUSCULAR; INTRAVENOUS; SUBCUTANEOUS
Status: DISCONTINUED | OUTPATIENT
Start: 2023-04-29 | End: 2023-04-29 | Stop reason: HOSPADM

## 2023-04-29 RX ORDER — MISOPROSTOL 200 UG/1
800 TABLET ORAL
Status: DISCONTINUED | OUTPATIENT
Start: 2023-04-29 | End: 2023-04-30 | Stop reason: HOSPADM

## 2023-04-29 RX ORDER — OXYTOCIN/0.9 % SODIUM CHLORIDE 30/500 ML
340 PLASTIC BAG, INJECTION (ML) INTRAVENOUS CONTINUOUS PRN
Status: DISCONTINUED | OUTPATIENT
Start: 2023-04-29 | End: 2023-04-30 | Stop reason: HOSPADM

## 2023-04-29 RX ORDER — ACETAMINOPHEN 325 MG/1
650 TABLET ORAL EVERY 4 HOURS PRN
Status: DISCONTINUED | OUTPATIENT
Start: 2023-04-29 | End: 2023-04-30 | Stop reason: HOSPADM

## 2023-04-29 RX ORDER — MODIFIED LANOLIN
OINTMENT (GRAM) TOPICAL
Status: DISCONTINUED | OUTPATIENT
Start: 2023-04-29 | End: 2023-04-30 | Stop reason: HOSPADM

## 2023-04-29 RX ORDER — NALBUPHINE HYDROCHLORIDE 10 MG/ML
2.5-5 INJECTION, SOLUTION INTRAMUSCULAR; INTRAVENOUS; SUBCUTANEOUS EVERY 6 HOURS PRN
Status: DISCONTINUED | OUTPATIENT
Start: 2023-04-29 | End: 2023-04-30 | Stop reason: HOSPADM

## 2023-04-29 RX ORDER — TRANEXAMIC ACID 10 MG/ML
1 INJECTION, SOLUTION INTRAVENOUS EVERY 30 MIN PRN
Status: DISCONTINUED | OUTPATIENT
Start: 2023-04-29 | End: 2023-04-30 | Stop reason: HOSPADM

## 2023-04-29 RX ORDER — OXYTOCIN 10 [USP'U]/ML
10 INJECTION, SOLUTION INTRAMUSCULAR; INTRAVENOUS
Status: DISCONTINUED | OUTPATIENT
Start: 2023-04-29 | End: 2023-04-30 | Stop reason: HOSPADM

## 2023-04-29 RX ORDER — HYDROCORTISONE 25 MG/G
CREAM TOPICAL 3 TIMES DAILY PRN
Status: DISCONTINUED | OUTPATIENT
Start: 2023-04-29 | End: 2023-04-30 | Stop reason: HOSPADM

## 2023-04-29 RX ORDER — BISACODYL 10 MG
10 SUPPOSITORY, RECTAL RECTAL DAILY PRN
Status: DISCONTINUED | OUTPATIENT
Start: 2023-04-29 | End: 2023-04-30 | Stop reason: HOSPADM

## 2023-04-29 RX ADMIN — Medication 2 MILLI-UNITS/MIN: at 01:50

## 2023-04-29 RX ADMIN — ACYCLOVIR 400 MG: 200 CAPSULE ORAL at 22:23

## 2023-04-29 RX ADMIN — SODIUM CHLORIDE, POTASSIUM CHLORIDE, SODIUM LACTATE AND CALCIUM CHLORIDE: 600; 310; 30; 20 INJECTION, SOLUTION INTRAVENOUS at 01:50

## 2023-04-29 RX ADMIN — Medication 340 ML/HR: at 10:23

## 2023-04-29 RX ADMIN — ACYCLOVIR 400 MG: 200 CAPSULE ORAL at 08:09

## 2023-04-29 RX ADMIN — ACETAMINOPHEN 650 MG: 325 TABLET ORAL at 08:07

## 2023-04-29 RX ADMIN — IBUPROFEN 800 MG: 800 TABLET ORAL at 20:51

## 2023-04-29 RX ADMIN — Medication: at 04:07

## 2023-04-29 ASSESSMENT — ACTIVITIES OF DAILY LIVING (ADL)
ADLS_ACUITY_SCORE: 18

## 2023-04-29 ASSESSMENT — LIFESTYLE VARIABLES: TOBACCO_USE: 1

## 2023-04-29 NOTE — PROGRESS NOTES
Labor Progress Note     S: Feels well     O:/59 (BP Location: Left arm, Patient Position: Semi-Mccarthy's)   Pulse 94   Temp 99.2  F (37.3  C) (Oral)   Resp 16   LMP 2022 (Approximate)   Gen:      Well-appearing, NAD  Pulm:    Breathing comfortably on room air  Abd:      Soft, gravid, non-tender  Ext:       trace LE edema b/l     Cervix: 6/90/0, forebag ruptured with exam      FHT:     , moderate variability, accels present, no decels  [place]:    2-3 contractions in 10min     A/P:  Christianne Geiger is a 32 year old , at 38w6d by LMP c/w 13w2d, who presents with rupture of membrane.     Prelabor rupture of membrane ( 9am)  -2/50/-2 () > s/p PO misoprostol x4 (2238) > 4/50/-2 (0015) > IV pitocin (started at 0150 and running at 8mU/min). Anticipate .   -S/p epidural 0407s  -GBS negative.     THC use in pregnancy: admission UDS obtained per hospital policy, which returned negative  Rubella NI: MMR vaccine after delivery.   FWB: Cat I tracing. Continuous Fetal Monitoring    Anjelica Arvizu MD  OB/GYN

## 2023-04-29 NOTE — L&D DELIVERY NOTE
"Delivery Summary    Christianne Geiger MRN# [id number]   Age: 32 year old YOB: 1990     ASSESSMENT & PLAN: Ms. Geiger is a 33 yo  [facility] 38w5d with PROM. Her pregnancy was complicated by a hx of GHTN, but normal BPs this pregnancy. She also had UDS + for THC and was rubella non-immune. She received cervical ripening with misoprostol and then pitocin once favorable. An epidural was placed for pain management. She progressed to complete dilation, pushed to a slow crown and delivered a vigorous female infant vertex, ELROY via . APGARs 9 and 9. Weight pending due to skin to skin contact. The placenta delivered with gentle cord traction and was noted to be intact with a 3V cord. There were no lacerations. Fundus was firm with fundal massage and IV pitocin. QBL 50cc. Mom and baby \"Anish\" were stable for transport to postpartum.        Fely, Female-Christianne [[id number]]    Labor Event Times    Latent labor onset date/time: 2023 0720    Dilation complete date: 23 Complete time: 10:05 AM   Start pushing date/time: 2023 1017      Labor Length    2nd Stage (hrs): 0 (min): 16   3rd Stage (hrs): 0 (min): 3      Labor Events     labor?: No  Labor Type: Induction/Cervical ripening  Predominate monitoring during 1st stage: continuous electronic fetal monitoring     Rupture date/time: 23 0900   Rupture type: Spontaneous Rupture of Membranes  Fluid color: Clear  Fluid odor: Normal     Induction: Misoprostol  Induction date/time:      Cervical ripening date/time: 23 1616    Indications for induction: Prelabor ROM     Augmentation: Oxytocin     Delivery/Placenta Date and Time    Delivery Date: 23 Delivery Time: 10:21 AM   Placenta Date/Time: 2023 10:25 AM  Oxytocin given at the time of delivery: with anterior shoulder  Delivering clinician: Anjelica Arvizu MD   Other personnel present at delivery:  Provider Role   Sada Beck RN Delivery Nurse "   Nathaly, Cheko, RN Charge Nurse         Vaginal Counts     Initial count performed by 2 team members:  Two Team Members   sada still rn       Needles Suture Needles Sponges (RETIRED) Instruments   Initial counts 2  5    Added to count       Relief counts       Final counts 2  5          Placed during labor Accounted for at the end of labor   FSE NA    IUPC NA    Cervidil NA               Final count performed by 2 team members:  Two Team Members   cheko Arvizu      Final count correct?: Yes  Pre-Birth Team Brief: Complete  Post-Birth Team Debrief: Complete     Apgars    Living status: Living   1 Minute 5 Minute 10 Minute 15 Minute 20 Minute   Skin color: 1        Heart rate: 2        Reflex irritability: 2        Muscle tone: 2        Respiratory effort: 2        Total: 9        Apgars assigned by: SADA LINDO     Cord    Vessels: 3 Vessels    Cord Complications: None   Cord Blood Disposition: Lab      Gases Sent?: No      Delayed cord clamping?: Yes      Cord Clamping Delay (seconds): 31-60 seconds      Stem cell collection?: No                     [place] Resuscitation    Methods: None     Labor Events and Shoulder Dystocia    Fetal Tracing Prior to Delivery: Category 1  Shoulder dystocia present?: Neg     Delivery (Maternal) (Provider to Complete) ([id number])    Episiotomy: None  Perineal lacerations: None    Repair suture: None  Genital tract inspection done: Pos     Blood Loss  Mother: Christianne Geiger #[id number]   Start of Mother's Information    Delivery Blood Loss  23 2221 - 23 1034    None           End of Mother's Information  Mother: Christianne Geiger #[id number]          Delivery - Provider to Complete ([id number])    Delivering clinician: Anjelica Arvizu MD  Delivery Type (Choose the 1 that will go to the Birth History): Vaginal, Spontaneous    Other personnel:  Provider Role   Sada Beck RN Delivery Nurse   Cheko Andersen RN Charge Nurse                               Placenta     Date/Time: 4/29/2023 10:25 AM  Removal: Spontaneous  Comments: 3V cord, intact  Disposition: Hospital disposal           Anesthesia    Method: Epidural                Presentation and Position    Presentation: Vertex    Position: Right Occiput Anterior                 Anjelica Arvizu MD

## 2023-04-29 NOTE — PLAN OF CARE
Assumed care of patient at 1900  Patient watching TV in bed with significant other at bedside, discussed plan of care for the night. Per MD continue oral miso until patient has increase in contraction intensity/frequency, then check patient, if Mcdaniels >8 start Pitocin.   2038 - patient not feeling contractions, minimal leaking of fluid that the patient states remains clear, bloody show present, continuing oral miso. Declines  2235 - patient reports contractions feels like strong period cramps, is able to talk through them, interventions offered patient declined and is trying to rest at this time. Patient states she is having bloody show but denies leaking of any fluid.  2305 - anesthesia at bedside to discuss epidural with patient before patient is requesting it.  0025 - patient reports she was able to sleep through contractions for the last hour, contractions are not waking her from sleep. Patient requesting to have cervical exam. SVE 4/50/-2. Mcdaniels score 8. Patient would like to have hour off monitor to take a bath prior to starting Pitocin.  0145 - patient soaking in the tub.  0125 - patient encouraged to get out of the tub. Stating she is coping with contractions, able to talk through them  0150 - Started Pitocin at 2 mU/min. Patient is going to try to rest.  0242 - patient breathing through contractions, encouraged patient to lay on side due to short periods of minimal variability - turned to left side with pillow to support back  0254 - pitocin increased to 4 mU/min  0336 - pitocin increased to 6 mU/min, patient requested epidura  0338 - anesthesia notified of epidural request  0342 - up to bathroom   0348 - anesthesia at bedside  0404 - epidural test dose   0433 - SVE 8/80%/-1  0445 - positioned tilted on right side with peanut ball between the knees  0544 - SVE 9/%/0  0548 - patient positioned on left side with knees together, pillow between ankles  0555 - provider notified of minimal variability and  most recent SVE, requesting to position patient and plan to recheck patient in 1 hour unless patient starts to feel pressure prior  0616 - blood pressure 97/52 - patient is asymptomatic for hypotension, patient updated on plan of care, is in agreement with this plan  0700 - patient introduced to day shift RN

## 2023-04-29 NOTE — PHARMACY-ADMISSION MEDICATION HISTORY
Medication Scribe Admission Medication History    Admission medication history is complete. The information provided in this note is only as accurate as the sources available at the time of the update.    Medication reconciliation/reorder completed by provider prior to medication history? Yes    Information Source(s): Patient via room phone    Pertinent Information: Patient hasn't taken Aspirin in over a month.  Never took any of the Zofran.    Changes made to PTA medication list:    Added: None    Deleted: Ondansetron(never took any from last November.    Changed: None    Medication Affordability:  Not including over the counter (OTC) medications, was there a time in the past 12 months when you did not take your medications as prescribed because of cost?: No    Allergies reviewed with patient and updates made in EHR: yes    Medication History Completed By: Roseanne Pérez 4/29/2023 4:48 PM    Prior to Admission medications    Medication Sig Last Dose Taking? Auth Provider Long Term End Date   acyclovir (ZOVIRAX) 400 MG tablet Take 1 tablet (400 mg) by mouth every 12 hours 4/27/2023 at 2200 Yes Irlanda Fournier MD Yes    aspirin (ASA) 81 MG EC tablet Take 1 tablet (81 mg) by mouth daily More than a month Yes Shanice Smith, DO No    Prenatal Vit-Fe Fumarate-FA (PRENATAL MULTIVITAMIN W/IRON) 27-0.8 MG tablet Take 1 tablet by mouth daily 4/28/2023 at am Yes Reported, Patient No

## 2023-04-29 NOTE — PROGRESS NOTES
Labor Progress Note    S: Feels well    O:  Patient Vitals for the past 24 hrs:   BP Temp Temp src Resp   23 0708 102/59 -- -- --   23 0638 101/57 -- -- --   23 0616 97/52 98.4  F (36.9  C) Oral --   23 0537 105/63 -- -- --   23 0506 106/62 -- -- --   23 0501 108/63 98.5  F (36.9  C) Oral 16   23 0456 109/62 -- -- --   23 0451 111/64 -- -- --   23 0446 107/63 -- -- --   23 0441 114/67 -- -- --   23 0431 105/61 -- -- --   23 0426 104/61 -- -- --   23 0421 107/57 -- -- --   23 0415 108/56 -- -- --   23 0413 112/62 98.8  F (37.1  C) -- --   23 0411 110/63 -- -- --   23 0409 109/64 -- -- --   23 0407 107/61 -- -- --   23 0406 115/73 -- -- --   23 0403 111/65 -- -- --   23 0153 105/61 98.5  F (36.9  C) Oral 16   23 0026 102/64 98.2  F (36.8  C) Oral 16   23 -- 98.4  F (36.9  C) Oral --   23 2030 108/59 97.8  F (36.6  C) Oral 18   23 1830 -- 98.1  F (36.7  C) Oral --   23 1633 112/68 98  F (36.7  C) Oral --   04/28/23 1325 126/84 98.6  F (37  C) Oral 18     Gen: Well-appearing, NAD  Pulm: Breathing comfortably on room air  Abd: Soft, gravid, non-tender  Ext: trace LE edema b/l    Cervix: 80/-2    FHT: , moderate variability, accels present, no decels  Mabel: 2-3 contractions in 10min    A/P:  Christianne Geiger is a 32 year old , at 38w6d by LMP c/w 13w2d, who presents with rupture of membrane.     Prelabor rupture of membrane ( 9am)  -2/50/-2 () > s/p PO misoprostol x4 (2238) > 4/50/-2 (0015) > IV pitocin (started at 0150 and running at 8mU/min).  Continue with Pitocin up titration  -S/p epidural 0407s  -GBS negative.    THC use in pregnancy: admission UDS obtained per hospital policy, which returned negative  Rubella NI: MMR vaccine after delivery.   FWB: Cat I tracing. Continuous Fetal Monitoring    Irlanda Fournier MD  Obstetrics and  Gynecology  [facility]   04/29/2023 7:41 AM

## 2023-04-29 NOTE — ANESTHESIA PROCEDURE NOTES
"Epidural catheter Procedure Note    Pre-Procedure   Staff -        CRNA: Jaspal Contreras APRN CRNA       Performed By: CRNA       Location: OB       Procedure Start/Stop Times: 4/29/2023 3:37 AM and 4/29/2023 4:09 AM       Pre-Anesthestic Checklist: patient identified, IV checked, risks and benefits discussed, informed consent, monitors and equipment checked, pre-op evaluation, at physician/surgeon's request and post-op pain management  Timeout:       Correct Patient: Yes        Correct Procedure: Yes        Correct Site: Yes        Correct Position: Yes   Procedure Documentation  Procedure: epidural catheter       Diagnosis: labor analgesia       Patient Position: sitting       Patient Prep/Sterile Barriers: sterile gloves, mask, patient draped       Skin prep: Chloraprep       Local skin infiltrated with 2 mL of 2% lidocaine.        Insertion Site: L3-4.       Technique: LORT saline        RIC at 5 cm.       Needle Type: [employer] needle       Needle Gauge: 17.        Needle Length (Inches): 3.5        Catheter: 19 G.          Catheter threaded easily.         6 cm epidural space.         Threaded 11 cm at skin.         # of attempts: 1 and  # of redirects:  0    Assessment/Narrative         Paresthesias: No.       Test dose of 3 mL lidocaine 1.5% w/ 1:200,000 epinephrine at 04:04 CDT.         Test dose negative, 3 minutes after injection, for signs of intravascular, subdural, or intrathecal injection.       Insertion/Infusion Method: LORT saline       Aspiration negative for Heme or CSF via Epidural Catheter.    Medication(s) Administered   0.125% Bupivacaine + 2 mcg/mL Fentanyl via CADD (Epidural) - EPIDURAL   8 mL - 4/29/2023 4:07:00 AM  Medication Administration Time: 4/29/2023 3:37 AM     Comments:  Bilateral T7 level      FOR [facility]) ONLY:   Pain Team Contact information: please page the Pain Team Via [employer]. Search \"Pain\". During daytime hours, please page the attending first. At night please page " the resident first.

## 2023-04-29 NOTE — ANESTHESIA PREPROCEDURE EVALUATION
Anesthesia Pre-Procedure Evaluation    Patient: Christianne Geiger   MRN: [id number] : 1990        Procedure :           Past Medical History:   Diagnosis Date     Chlamydia      Pregnancy induced hypertension 2019      Past Surgical History:   Procedure Laterality Date     SURGICAL HISTORY OF -       T & A     SURGICAL HISTORY OF -       Left eye surgery      No Known Allergies   Social History     Tobacco Use     Smoking status: Every Day     Packs/day: 0.50     Types: Cigarettes     Smokeless tobacco: Never     Tobacco comments:     down to 8-10 cig/day with pregnancy   Vaping Use     Vaping status: Never Used   Substance Use Topics     Alcohol use: Not Currently     Comment: rare- quit with pregnancy      Wt Readings from Last 1 Encounters:   23 74.4 kg (164 lb)        Anesthesia Evaluation            ROS/MED HX  ENT/Pulmonary:     (+) tobacco use,     Neurologic:       Cardiovascular:     (+) hypertension-----    METS/Exercise Tolerance:     Hematologic:       Musculoskeletal:       GI/Hepatic:     (+) GERD,     Renal/Genitourinary:       Endo:       Psychiatric/Substance Use:     (+) psychiatric history depression Recreational drug usage: Cannabis.    Infectious Disease:       Malignancy:       Other:            Physical Exam    Airway  airway exam normal      Mallampati: II   TM distance: > 3 FB   Neck ROM: full   Mouth opening: > 3 cm    Respiratory Devices and Support         Dental       (+) Minor Abnormalities - some fillings, tiny chips      Cardiovascular   cardiovascular exam normal          Pulmonary   pulmonary exam normal                OUTSIDE LABS:  CBC:   Lab Results   Component Value Date    WBC 13.2 (H) 2023    WBC 13.3 (H) 2023    HGB 11.3 (L) 2023    HGB 11.6 (L) 2023    HCT 32.7 (L) 2023    HCT 35.0 2023     2023     2023     BMP:   Lab Results   Component Value Date     2021     2004     POTASSIUM 3.6 04/18/2021    POTASSIUM 3.9 06/22/2004    CHLORIDE 107 04/18/2021    CHLORIDE 107 06/22/2004    CO2 20 04/18/2021    CO2 25 06/22/2004    BUN 5 (L) 04/18/2021    BUN 7 06/22/2004    CR 0.55 11/08/2022    CR 0.57 04/18/2021    GLC 94 04/18/2021    GLC 96 10/26/2017     COAGS: No results found for: PTT, INR, FIBR  POC:   Lab Results   Component Value Date    HCG Negative 03/12/2018    HCGS Negative 11/09/2017     HEPATIC:   Lab Results   Component Value Date    ALBUMIN 2.3 (L) 04/18/2021    PROTTOTAL 6.3 (L) 04/18/2021    ALT 26 11/08/2022    AST 22 11/08/2022    ALKPHOS 154 (H) 04/18/2021    BILITOTAL 0.6 04/18/2021     OTHER:   Lab Results   Component Value Date    LACT 1.5 04/18/2021    A1C 5.2 09/21/2020    KRISTOFER 7.6 (L) 04/18/2021    TSH 0.49 10/26/2017    T4 1.18 11/04/2008       Anesthesia Plan    ASA Status:  2   NPO Status:  ELEVATED Aspiration Risk/Unknown    Anesthesia Type: Epidural.   Induction: N/a.   Maintenance: N/A.        Consents    Anesthesia Plan(s) and associated risks, benefits, and realistic alternatives discussed. Questions answered and patient/representative(s) expressed understanding.     - Discussed: Risks, Benefits and Alternatives for BOTH SEDATION and the PROCEDURE were discussed     - Discussed with:  Patient         Postoperative Care    Pain management: IV analgesics, Oral pain medications, Neuraxial analgesia, Multi-modal analgesia.   PONV prophylaxis: Ondansetron (or other 5HT-3), Dexamethasone or Solumedrol     Comments:                TITO Jerez CRNA

## 2023-04-29 NOTE — PLAN OF CARE
Epidural cartridge bag was opened by anesthesia, when opened medication leaked out of the bag and onto the floor. When looking at the epidural cartridge the clamp was intact and the cap was in place. It appeared as if the bag holding the medication inside the cartridge was possibly leaking. New epidural cartridge obtained. This malfunctioning cartridge was wasted.

## 2023-04-30 VITALS
SYSTOLIC BLOOD PRESSURE: 95 MMHG | HEART RATE: 74 BPM | DIASTOLIC BLOOD PRESSURE: 49 MMHG | BODY MASS INDEX: 30 KG/M2 | TEMPERATURE: 98.1 F | WEIGHT: 164 LBS | RESPIRATION RATE: 16 BRPM

## 2023-04-30 PROBLEM — F12.10 TETRAHYDROCANNABINOL (THC) USE DISORDER, MILD, ABUSE: Status: ACTIVE | Noted: 2020-12-03

## 2023-04-30 PROBLEM — D50.8 IRON DEFICIENCY ANEMIA SECONDARY TO INADEQUATE DIETARY IRON INTAKE: Status: ACTIVE | Noted: 2023-04-30

## 2023-04-30 PROBLEM — Z34.80 PRENATAL CARE, SUBSEQUENT PREGNANCY: Status: ACTIVE | Noted: 2020-09-01

## 2023-04-30 PROCEDURE — 250N000013 HC RX MED GY IP 250 OP 250 PS 637: Performed by: STUDENT IN AN ORGANIZED HEALTH CARE EDUCATION/TRAINING PROGRAM

## 2023-04-30 PROCEDURE — 250N000013 HC RX MED GY IP 250 OP 250 PS 637: Performed by: OBSTETRICS & GYNECOLOGY

## 2023-04-30 RX ADMIN — ACYCLOVIR 400 MG: 200 CAPSULE ORAL at 07:30

## 2023-04-30 RX ADMIN — IBUPROFEN 800 MG: 800 TABLET ORAL at 07:30

## 2023-04-30 ASSESSMENT — ACTIVITIES OF DAILY LIVING (ADL)
ADLS_ACUITY_SCORE: 18

## 2023-04-30 NOTE — PLAN OF CARE
Data: Vital signs within normal limits. Postpartum checks within normal limits - see flow record. Patient  is tolerating po intake. Patient is able to empty bladder independently. . Patient ambulating independently. No apparent signs of infection. perineum healing well. Patient is performing self cares and is able to care for infant. Positive attachment behaviors are observed with infant. Support persons are not present.  Action:  Pain plan was discussed. Patient will request pain med when she is ready for it. Patient was medicated during the shift for pain and cramping. See MAR.Patient education done about breastfeeding,  cares, postpartum cares, pain management/plan, rest, and discharge from hospital. See flow record.  Response:   Patient reassessed within 1 hour after each medication for pain. Patient stated that pain had improved. Patient stated that she was comfortable. .   Plan: Anticipate discharge on .

## 2023-04-30 NOTE — PROGRESS NOTES
[facility] Obstetrics Post-Partum Progress Note          Assessment and Plan:    Assessment:   Post-partum day #1  Normal spontaneous vaginal delivery    L&D complications: Prolonged ROM  Intrauterine pregnancy at 38+6 weeks gestation  Positive THC on testing      Doing well.  No excessive bleeding  Pain well-controlled.      Plan:   Ambulation encouraged  Breast feeding strategies discussed  Anticipate discharge tomorrow           Interval History:   Doing well.  Pain is well-controlled.  No fevers.  No history of foul-smelling vaginal discharge.  Good appetite.  Denies chest pain, shortness of breath, nausea or vomiting.  Vaginal bleeding is similar to a heavy menstrual flow.  Ambulatory.  Breastfeeding well.          Significant Problems:    None          Review of Systems:    The Review of Systems is negative other than noted in the HPI          Medications:     All medications related to the patient's surgery have been reviewed  Current Facility-Administered Medications   Medication     acetaminophen (TYLENOL) tablet 650 mg     acyclovir (ZOVIRAX) capsule 400 mg     benzocaine-menthol (DERMOPLAST) topical spray     bisacodyl (DULCOLAX) suppository 10 mg     carboprost (HEMABATE) injection 250 mcg     docusate sodium (COLACE) capsule 100 mg     hydrocortisone (Perianal) (ANUSOL-HC) 2.5 % cream     ibuprofen (ADVIL/MOTRIN) tablet 800 mg     ketorolac (TORADOL) injection 30 mg    Or     ketorolac (TORADOL) injection 30 mg    Or     ibuprofen (ADVIL/MOTRIN) tablet 800 mg     lanolin cream     lidocaine 1 % 0.1-20 mL     Measles, Mumps & Rubella Vac (MMR) injection 0.5 mL     methylergonovine (METHERGINE) injection 200 mcg     misoprostol (CYTOTEC) tablet 400 mcg    Or     misoprostol (CYTOTEC) tablet 800 mcg     nalbuphine (NUBAIN) injection 2.5-5 mg     No Tdap Needed - Assessment: Patient does not need Tdap vaccine     oxytocin (PITOCIN) 30 units in 500 mL 0.9% NaCl infusion     oxytocin (PITOCIN)  30 units in 500 mL 0.9% NaCl infusion     oxytocin (PITOCIN) injection 10 Units     oxytocin (PITOCIN) injection 10 Units     tranexamic acid 1 g in 100 mL NS IV bag (premix)             Physical Exam:   All vitals stable  Temp: 98.1  F (36.7  C) Temp src: Oral BP: 95/49 Pulse: 74   Resp: 16   O2 Device: None (Room air)     Exam:  Constitutional: healthy, alert and no distress    Cardiovascular: negative  Respiratory: negative  Gastrointestinal: Abdomen soft, non-tender. BS normal. No masses, organomegaly FF@U-2  : Deferred  Musculoskeletal: extremities normal- no gross deformities noted, gait normal and normal muscle tone  Skin: no suspicious lesions or rashes  Neurologic: Gait normal. Reflexes normal and symmetric. Sensation grossly WNL.  Psychiatric: mentation appears normal and affect normal/bright    Uterine fundus is firm, non-tender and at the level of the umbilicus          Data:     All laboratory data related to this surgery reviewed  Hemoglobin   Date Value Ref Range Status   04/28/2023 11.3 (L) 11.7 - 15.7 g/dL Final   03/01/2023 11.6 (L) 11.7 - 15.7 g/dL Final   11/08/2022 12.8 11.7 - 15.7 g/dL Final   04/19/2021 10.3 (L) 11.7 - 15.7 g/dL Final   04/18/2021 12.1 11.7 - 15.7 g/dL Final   01/28/2021 11.9 11.7 - 15.7 g/dL Final   09/21/2020 14.0 11.7 - 15.7 g/dL Final   04/03/2019 10.6 (L) 11.7 - 15.7 g/dL Final     No imaging studies have been ordered    Bright Calvo MD

## 2023-04-30 NOTE — DISCHARGE SUMMARY
[facility] Discharge Summary    Christianne Geiger MRN# [id number]   Age: 32 year old YOB: 1990     Date of Admission:  4/28/2023  Date of Discharge::  4/30/2023  Admitting Physician:  Irlanda Fournier MD  Discharge Physician:  Bright Calvo MD     Home clinic: [facility]          Admission Diagnoses:   Encounter for triage in pregnant patient [Z36.89]          Discharge Diagnosis:   Normal spontaneous vaginal delivery  Intrauterine pregnancy at 38+6 weeks gestation  Prolonged Rupture of membranes          Procedures:   Procedure(s): No additional procedures performed       No other procedures performed during this admission           Medications Prior to Admission:     Medications Prior to Admission   Medication Sig Dispense Refill Last Dose     acyclovir (ZOVIRAX) 400 MG tablet Take 1 tablet (400 mg) by mouth every 12 hours 90 tablet 6 4/27/2023 at 2200     aspirin (ASA) 81 MG EC tablet Take 1 tablet (81 mg) by mouth daily 90 tablet 1 More than a month     Prenatal Vit-Fe Fumarate-FA (PRENATAL MULTIVITAMIN W/IRON) 27-0.8 MG tablet Take 1 tablet by mouth daily   4/28/2023 at am             Discharge Medications:     Current Discharge Medication List      CONTINUE these medications which have NOT CHANGED    Details   acyclovir (ZOVIRAX) 400 MG tablet Take 1 tablet (400 mg) by mouth every 12 hours  Qty: 90 tablet, Refills: 6    Associated Diagnoses: Oral herpes      aspirin (ASA) 81 MG EC tablet Take 1 tablet (81 mg) by mouth daily  Qty: 90 tablet, Refills: 1    Associated Diagnoses: History of gestational hypertension      Prenatal Vit-Fe Fumarate-FA (PRENATAL MULTIVITAMIN W/IRON) 27-0.8 MG tablet Take 1 tablet by mouth daily                   Consultations:   No consultations were requested during this admission          Brief History of Labor:     Christianne Geiger MRN# [id number]   Age: 32 year old YOB: 1990      ASSESSMENT & PLAN: Ms. Geiger is a 32  "yo  [facility] 38w5d with PROM. Her pregnancy was complicated by a hx of GHTN, but normal BPs this pregnancy. She also had UDS + for THC and was rubella non-immune. She received cervical ripening with misoprostol and then pitocin once favorable. An epidural was placed for pain management. She progressed to complete dilation, pushed to a slow crown and delivered a vigorous female infant vertex, ELROY via . APGARs 9 and 9. Weight pending due to skin to skin contact. The placenta delivered with gentle cord traction and was noted to be intact with a 3V cord. There were no lacerations. Fundus was firm with fundal massage and IV pitocin. QBL 50cc. Mom and baby \"[place]\" were stable for transport to postpartum.                 Hospital Course:   The patient's hospital course was unremarkable.  On discharge, her pain was well controlled. Vaginal bleeding is similar to peak menstrual flow.  Voiding without difficulty.  Ambulating well and tolerating a normal diet.  No fever.  Breastfeeding well.  Infant is stable.  No bowel movement yet.*  She was discharged on post-partum day #1.    Post-partum hemoglobin:   Hemoglobin   Date Value Ref Range Status   2023 11.3 (L) 11.7 - 15.7 g/dL Final   2021 10.3 (L) 11.7 - 15.7 g/dL Final             Discharge Instructions and Follow-Up:   Discharge diet: Regular   Discharge activity: Pelvic rest: abstain from intercourse and do not use tampons for 6 week(s)   Discharge follow-up: Follow up with   in 6 weeks               Discharge Disposition:   Discharged to home      Attestation:  I have reviewed today's vital signs, notes, medications, labs and imaging.  Amount of time performed on this discharge summary: 10 minutes.    Bright Calvo MD     "

## 2023-04-30 NOTE — DISCHARGE INSTRUCTIONS
Warning Signs after Having a Baby    Keep this paper on your fridge or somewhere else where you can see it.    Call your provider if you have any of these symptoms up to 12 weeks after having your baby.    Thoughts of hurting yourself or your baby  Pain in your chest or trouble breathing  Severe headache not helped by pain medicine  Eyesight concerns (blurry vision, seeing spots or flashes of light, other changes to eyesight)  Fainting, shaking or other signs of a seizure    Call 9-1-1 if you feel that it is an emergency.     The symptoms below can happen to anyone after giving birth. They can be very serious. Call your provider if you have any of these warning signs.    My provider s phone number: [phone]    Losing too much blood (hemorrhage)    Call your provider if you soak through a pad in less than an hour or pass blood clots bigger than a golf ball. These may be signs that you are bleeding too much.    Blood clots in the legs or lungs    After you give birth, your body naturally clots its blood to help prevent blood loss. Sometimes this increased clotting can happen in other areas of the body, like the legs or lungs. This can block your blood flow and be very dangerous.     Call your provider if you:  Have a red, swollen spot on the back of your leg that is warm or painful when you touch it.   Are coughing up blood.     Infection    Call your provider if you have any of these symptoms:  Fever of 100.4 F (38 C) or higher.  Pain or redness around your stitches if you had an incision.   Any yellow, white, or green fluid coming from places where you had stitches or surgery.    Mood Problems (postpartum depression)    Many people feel sad or have mood changes after having a baby. But for some people, these mood swings are worse.     Call your provider right away if you feel so anxious or nervous that you can't care for yourself or your baby.    Preeclampsia (high blood pressure)    Even if you didn't have high  blood pressure when you were pregnant, you are at risk for the high blood pressure disease called preeclampsia. This risk can last up to 12 weeks after giving birth.     Call your provider if you have:   Pain on your right side under your rib cage  Sudden swelling in the hands and face    Remember: You know your body. If something doesn't feel right, get medical help.     For informational purposes only. Not to replace the advice of your health care provider. Copyright 2020 [facility]. All rights reserved. Clinically reviewed by Annabelle Torres RNC-OB, MSN. Fipeo [id number] - Rev 02/23.

## 2023-06-26 ENCOUNTER — TELEPHONE (OUTPATIENT)
Dept: OBGYN | Facility: CLINIC | Age: 33
End: 2023-06-26

## 2023-06-26 ENCOUNTER — OFFICE VISIT (OUTPATIENT)
Dept: OBGYN | Facility: CLINIC | Age: 33
End: 2023-06-26
Payer: COMMERCIAL

## 2023-06-26 ENCOUNTER — PREP FOR PROCEDURE (OUTPATIENT)
Dept: OBGYN | Facility: CLINIC | Age: 33
End: 2023-06-26

## 2023-06-26 ENCOUNTER — MYC MEDICAL ADVICE (OUTPATIENT)
Dept: OBGYN | Facility: CLINIC | Age: 33
End: 2023-06-26

## 2023-06-26 ENCOUNTER — PRENATAL OFFICE VISIT (OUTPATIENT)
Dept: OBGYN | Facility: CLINIC | Age: 33
End: 2023-06-26
Payer: COMMERCIAL

## 2023-06-26 VITALS
RESPIRATION RATE: 16 BRPM | HEART RATE: 94 BPM | TEMPERATURE: 98.5 F | WEIGHT: 153.9 LBS | DIASTOLIC BLOOD PRESSURE: 77 MMHG | HEIGHT: 62 IN | BODY MASS INDEX: 28.32 KG/M2 | SYSTOLIC BLOOD PRESSURE: 124 MMHG

## 2023-06-26 DIAGNOSIS — D50.8 IRON DEFICIENCY ANEMIA SECONDARY TO INADEQUATE DIETARY IRON INTAKE: ICD-10-CM

## 2023-06-26 DIAGNOSIS — Z12.4 CERVICAL CANCER SCREENING: Primary | ICD-10-CM

## 2023-06-26 DIAGNOSIS — Z30.2 ENCOUNTER FOR STERILIZATION: Primary | ICD-10-CM

## 2023-06-26 DIAGNOSIS — Z30.09 STERILIZATION CONSULT: Primary | ICD-10-CM

## 2023-06-26 PROCEDURE — 99213 OFFICE O/P EST LOW 20 MIN: CPT | Performed by: STUDENT IN AN ORGANIZED HEALTH CARE EDUCATION/TRAINING PROGRAM

## 2023-06-26 PROCEDURE — 87624 HPV HI-RISK TYP POOLED RSLT: CPT | Performed by: ADVANCED PRACTICE MIDWIFE

## 2023-06-26 PROCEDURE — G0145 SCR C/V CYTO,THINLAYER,RESCR: HCPCS | Performed by: ADVANCED PRACTICE MIDWIFE

## 2023-06-26 PROCEDURE — 99207 PR POST PARTUM EXAM: CPT | Performed by: ADVANCED PRACTICE MIDWIFE

## 2023-06-26 RX ORDER — ACETAMINOPHEN 325 MG/1
975 TABLET ORAL ONCE
Status: CANCELLED | OUTPATIENT
Start: 2023-06-26 | End: 2023-06-26

## 2023-06-26 ASSESSMENT — ANXIETY QUESTIONNAIRES
GAD7 TOTAL SCORE: 4
6. BECOMING EASILY ANNOYED OR IRRITABLE: SEVERAL DAYS
7. FEELING AFRAID AS IF SOMETHING AWFUL MIGHT HAPPEN: NOT AT ALL
1. FEELING NERVOUS, ANXIOUS, OR ON EDGE: NOT AT ALL
5. BEING SO RESTLESS THAT IT IS HARD TO SIT STILL: NOT AT ALL
2. NOT BEING ABLE TO STOP OR CONTROL WORRYING: NOT AT ALL
IF YOU CHECKED OFF ANY PROBLEMS ON THIS QUESTIONNAIRE, HOW DIFFICULT HAVE THESE PROBLEMS MADE IT FOR YOU TO DO YOUR WORK, TAKE CARE OF THINGS AT HOME, OR GET ALONG WITH OTHER PEOPLE: SOMEWHAT DIFFICULT
GAD7 TOTAL SCORE: 4
3. WORRYING TOO MUCH ABOUT DIFFERENT THINGS: NOT AT ALL

## 2023-06-26 ASSESSMENT — PATIENT HEALTH QUESTIONNAIRE - PHQ9
5. POOR APPETITE OR OVEREATING: NEARLY EVERY DAY
SUM OF ALL RESPONSES TO PHQ QUESTIONS 1-9: 0

## 2023-06-26 NOTE — TELEPHONE ENCOUNTER
Paperwork completed and signed by Dr. Fournier faxed to [phone] per pt request.    Audra Melbourne   [facility] [place]   [facility]  [phone]

## 2023-06-26 NOTE — PROGRESS NOTES
SUBJECTIVE:  is here for a 6-week postpartum checkup.    Delivery date was 23. She had a  of a viable girl, with no complications.  Since delivery, she has been breast feeding.  She has no signs of infection, bleeding or other complications.    We discussed contraception and she has chosen tubal ligation.  She  has not had intercourse since delivery and complains of No discomfort. Patient screened for postpartum depression and complaints are NEGATIVE. Screening has also been completed for intimate partner violence.    EXAM:  GENERAL healthy, alert, no distress and cooperative  RESP: Clear to auscultation  BREASTS: NEGATIVE, lactating   CV: NEGATIVE  Pelvic Exam:  Vulva: No external lesions, normal hair distribution, no adenopathy  Vagina: Moist, pink, no abnormal discharge, well rugated, no lesions  Cervix: Pap smear is taken, parous, smooth, pink, no visible lesions  Uterus: Normal size, anteverted, non-tender, mobile  Ovaries: No mass, non-tender, mobile  SKIN: no ulcers, lesions or rash  PSYCH: NEGATIVE  PHQ-9 0   DEBI 7 4     ASSESSMENT:   Normal postpartum exam after .    PLAN:  BP elevated upon rooming.  Retaken when settled 124/77.  Continue to watch.    Return as needed or at time interval for next routine pap, pelvic, or breast exam.  Encourage Kegals and abdominal exercise.  Continue a multi vitamin supplement, especially if breastfeeding.  Pap smear was obtained.  Post partum Hgb was not obtained.  Dr. Fournier met with her today today discuss future tubal ligation.     Elina Silva, DNP, APRN, CNM

## 2023-06-26 NOTE — PROGRESS NOTES
The patient presents today for her postpartum visit with CNVICTORIANO and requesting scheduling for tubal ligation. We discussed that female sterilization (also referred to as tubal ligation) can be performed using several different procedures and techniques that provide permanent contraception for women. The standard of care for permanent sterilization is via bilateral salpingectomy now with the known decreased in ovarian cancer risks in the future.    We discussed that tubal ligation or female sterilization is a permanent procedure.  We reviewed risk of regret and that this risk is higher in women younger than 30 years of age. The patient reports that she is 100% sure of her desire for permanent sterilization.    We discussed the risk reduction for ovarian cancer for all types of tubal ligation with the greatest risk reduction with bilateral salpingectomy.    I reviewed the risks of laparoscopic sterilization including but not limited to risk of anesthetic complication, infection, excessive bleeding, risk of injury to bowel, bladder, or vasculature. We discussed in detail the surgery, the need for general anesthesia and the fact that this is typically an outpatient procedure.     Pt has no significant PMH or PSH. Preop order placed. I can perform H&P the morning of surgery.    Irlanda Fournier MD  Obstetrics and Gynecology  [facility]   06/26/2023

## 2023-06-26 NOTE — TELEPHONE ENCOUNTER
Return to work paperwork printed and given to Dr. Fournier to approve and sign.        Audra Harrison   Clinic Station    [facility]  [phone]

## 2023-06-26 NOTE — TELEPHONE ENCOUNTER
"[id number]  Christianne Troyrima    You are now scheduled for surgery at [facility].  Below are the details for your surgery.  Please read the \"Preparing for Your Surgery\" instructions and let us know if you have any questions.    Type of surgery: SALPINGECTOMY, LAPAROSCOPIC  Surgeon:  Irlanda Fournier MD  Location of surgery: [facility] OR    Date of surgery:  8/1/23    Time: 7:30  Arrival Time:  6:00 am    Time can change, to be confirmed a couple of days prior by pre-op surgery nurse.    Pre-Op Appt Date: Patient to schedule with a PCP or Family Practice Provider within 30 days to the surgery.  Post-Op Appt Date: Does not need    Packet sent out: Yes  Pre-cert/Authorization completed:  TBD by Financial Securing Office.   MA Sterilization/Hysterectomy Acknowledgment Consent signed: No     [facility]  [phone]    Fax: [phone]  Same Day Surgery [phone]  Fax: [phone]  [facility] [phone]    "

## 2023-06-26 NOTE — PROGRESS NOTES
"Initial BP (!) 140/86 (BP Location: Left arm, Patient Position: Chair, Cuff Size: Adult Regular)   Pulse 115   Temp 98.5  F (36.9  C) (Tympanic)   Resp 16   Ht 1.575 m (5' 2\")   Wt 69.8 kg (153 lb 14.4 oz)   LMP 07/31/2022 (Approximate)   Breastfeeding Yes   BMI 28.15 kg/m   Estimated body mass index is 28.15 kg/m  as calculated from the following:    Height as of this encounter: 1.575 m (5' 2\").    Weight as of this encounter: 69.8 kg (153 lb 14.4 oz). .      "

## 2023-06-26 NOTE — TELEPHONE ENCOUNTER
Return to work paperwork printed and given to Dr. Fournier to approve and sign.        Audra Lacey   Clinic Station    [facility]  [phone]

## 2023-06-28 LAB
BKR LAB AP GYN ADEQUACY: NORMAL
BKR LAB AP GYN INTERPRETATION: NORMAL
BKR LAB AP HPV REFLEX: NORMAL
BKR LAB AP PREVIOUS ABNORMAL: NORMAL
PATH REPORT.COMMENTS IMP SPEC: NORMAL
PATH REPORT.COMMENTS IMP SPEC: NORMAL
PATH REPORT.RELEVANT HX SPEC: NORMAL

## 2023-06-29 LAB
HUMAN PAPILLOMA VIRUS 16 DNA: NEGATIVE
HUMAN PAPILLOMA VIRUS 18 DNA: NEGATIVE
HUMAN PAPILLOMA VIRUS FINAL DIAGNOSIS: NORMAL
HUMAN PAPILLOMA VIRUS OTHER HR: NEGATIVE

## 2023-07-27 ENCOUNTER — OFFICE VISIT (OUTPATIENT)
Dept: FAMILY MEDICINE | Facility: CLINIC | Age: 33
End: 2023-07-27
Payer: COMMERCIAL

## 2023-07-27 VITALS
HEART RATE: 102 BPM | RESPIRATION RATE: 16 BRPM | BODY MASS INDEX: 28.16 KG/M2 | DIASTOLIC BLOOD PRESSURE: 68 MMHG | HEIGHT: 62 IN | OXYGEN SATURATION: 97 % | WEIGHT: 153 LBS | TEMPERATURE: 99.5 F | SYSTOLIC BLOOD PRESSURE: 118 MMHG

## 2023-07-27 DIAGNOSIS — Z30.2 STERILIZATION: ICD-10-CM

## 2023-07-27 DIAGNOSIS — Z01.818 PREOP GENERAL PHYSICAL EXAM: Primary | ICD-10-CM

## 2023-07-27 PROBLEM — Z36.89 ENCOUNTER FOR TRIAGE IN PREGNANT PATIENT: Status: RESOLVED | Noted: 2023-04-28 | Resolved: 2023-07-27

## 2023-07-27 PROBLEM — Z34.80 PRENATAL CARE, SUBSEQUENT PREGNANCY: Status: RESOLVED | Noted: 2020-09-01 | Resolved: 2023-07-27

## 2023-07-27 PROBLEM — F12.91 HISTORY OF MARIJUANA USE: Status: ACTIVE | Noted: 2020-12-03

## 2023-07-27 PROCEDURE — 99213 OFFICE O/P EST LOW 20 MIN: CPT | Performed by: STUDENT IN AN ORGANIZED HEALTH CARE EDUCATION/TRAINING PROGRAM

## 2023-07-27 ASSESSMENT — PAIN SCALES - GENERAL: PAINLEVEL: NO PAIN (0)

## 2023-07-27 NOTE — H&P (VIEW-ONLY)
[facility]  [address] [place] [id number]  Phone: [phone]  Fax: [phone]  Primary Provider: Caro Barrios  Pre-op Performing Provider: USMAN MARQUEZ      PREOPERATIVE EVALUATION:  Today's date: 7/27/2023    Christianne Geiger is a 32 year old female who presents for a preoperative evaluation.      7/27/2023     2:49 PM   Additional Questions   Roomed by Mary Kate CASTILLO   Accompanied by Self       Surgical Information:  Surgery/Procedure: SALPINGECTOMY, LAPAROSCOPIC   Surgery Location: [place]  Surgeon:   Surgery Date: 08/01/2023  Time of Surgery: 06:30am  Where patient plans to recover: At home with family  Fax number for surgical facility: Note does not need to be faxed, will be available electronically in Epic.    Assessment & Plan     The proposed surgical procedure is considered LOW risk.    Preop general physical exam  Sterilization  Patient is a very pleasant, generally healthy 32 year old who presents today for preop prior to sterilization procedure next week. Patient is not on any chronic medication daily, though has acyclovir PRN for cold sore flares. None currently. Well appearing on exam today. History of mildly low hemoglobin. No indication to repeat today. Plan to have UPT done day of surgery per surgeon. Of note, does not use any tobacco, regular alcohol, or other drugs.         - No identified additional risk factors other than previously addressed    Antiplatelet or Anticoagulation Medication Instructions:   - Patient is on no antiplatelet or anticoagulation medications.    Additional Medication Instructions:  Patient is on no additional chronic medications    RECOMMENDATION:  APPROVAL GIVEN to proceed with proposed procedure, without further diagnostic evaluation.    I spent a total of 20 minutes on the day of the visit.   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   Usually normally once in the summer and then once in the winter.         7/27/2023     2:42 PM   Preop Questions   1. Have you ever had a heart attack or stroke? No   2. Have you ever had surgery on your heart or blood vessels, such as a stent placement, a coronary artery bypass, or surgery on an artery in your head, neck, heart, or legs? No   3. Do you have chest pain with activity? No   4. Do you have a history of  heart failure? No   5. Do you currently have a cold, bronchitis or symptoms of other infection? No   6. Do you have a cough, shortness of breath, or wheezing? No   7. Do you or anyone in your family have previous history of blood clots? No   8. Do you or does anyone in your family have a serious bleeding problem such as prolonged bleeding following surgeries or cuts? No   9. Have you ever had problems with anemia or been told to take iron pills? UNKNOWN - saw it on her [facility] problem list, but wasn't told in the past. Last hemoglobin of 11.3 on 4/28/23   10. Have you had any abnormal blood loss such as black, tarry or bloody stools, or abnormal vaginal bleeding? No   11. Have you ever had a blood transfusion? No   12. Are you willing to have a blood transfusion if it is medically needed before, during, or after your surgery? Yes   13. Have you or any of your relatives ever had problems with anesthesia? No   14. Do you have sleep apnea, excessive snoring or daytime drowsiness? No   15. Do you have any artifical heart valves or other implanted medical devices like a pacemaker, defibrillator, or continuous glucose monitor? No   16. Do you have artificial joints? No   17. Are you allergic to latex? No   18. Is there any chance that you may be pregnant? UNKNOWN - last period a year ago. Breastfeeding. Around 3 months previously was when period came back.        Health Care Directive:  Patient does not  have a Health Care Directive or Living Will: Discussed advance care planning with patient; information given to patient to review.    Preoperative Review of :   reviewed - no record of controlled substances prescribed.    Status of Chronic Conditions:  See problem list for active medical problems.  Problems all longstanding and stable, except as noted/documented.  See ROS for pertinent symptoms related to these conditions.    Review of Systems  Constitutional, neuro, ENT, endocrine, pulmonary, cardiac, gastrointestinal, genitourinary, musculoskeletal, integument and psychiatric systems are negative, except as otherwise noted.        Patient Active Problem List    Diagnosis Date Noted    Iron deficiency anemia secondary to inadequate dietary iron intake 04/30/2023     Priority: Medium    History of marijuana use 12/03/2020     Priority: Medium    History of gestational hypertension 10/20/2020     Priority: Medium    Insulin resistance 06/25/2018     Priority: Medium     Previously on Metformin        PCOS (polycystic ovarian syndrome) 06/25/2018     Priority: Medium     On Metformin; day 21 prog low  PLAN: Femara  Cycle #1 Femara 2.5mg po day 3-7; day 21 prog/ CONCEIVED PRIOR TO TAKING FEMARA      Conception #2  spontaneous      History of tobacco use disorder 08/28/2015     Priority: Medium    Depression 11/18/2008     Priority: Medium    Esophageal reflux 03/22/2006     Priority: Medium      Past Medical History:   Diagnosis Date    Chlamydia     Pregnancy induced hypertension 2019     Past Surgical History:   Procedure Laterality Date    SURGICAL HISTORY OF -   2000    T & A    SURGICAL HISTORY OF -   2001    Left eye surgery     Current Outpatient Medications   Medication Sig Dispense Refill    acyclovir (ZOVIRAX) 400 MG tablet Take 1 tablet (400 mg) by mouth every 12 hours 90 tablet 6       No Known Allergies     Social History     Tobacco Use    Smoking status: Former     Packs/day: 0.50     Types:  "Cigarettes    Smokeless tobacco: Never    Tobacco comments:     down to 8-10 cig/day with pregnancy   Substance Use Topics    Alcohol use: Not Currently     Comment: rare- quit with pregnancy     Family History   Problem Relation Age of Onset    Cancer Mother         Cervical    Irritable Bowel Syndrome Mother     Depression Mother     Brain Tumor Mother         benign    Respiratory Father         Asthma    Diabetes Father         type II    Respiratory Brother         Asthma    Gastrointestinal Disease Brother         Reflux    Kidney Disease Maternal Grandmother     Alcohol/Drug Maternal Grandfather         alcohol    Lung Cancer Paternal Grandmother     Respiratory Paternal Grandfather         Asthma    Diabetes Paternal Grandfather     Cancer Paternal Grandfather      History   Drug Use    Types: Marijuana     Comment: using gummies for nausea with pregnancy         Objective     /68 (BP Location: Right arm, Patient Position: Sitting, Cuff Size: Adult Regular)   Pulse 102   Temp 99.5  F (37.5  C) (Tympanic)   Resp 16   Ht 1.562 m (5' 1.5\")   Wt 69.4 kg (153 lb)   SpO2 97%   Breastfeeding Yes   BMI 28.44 kg/m      Physical Exam    GENERAL APPEARANCE: healthy, alert and no distress     EYES: EOMI     HENT: ear canals and TM's normal and nose and mouth without ulcers or lesions     NECK: no adenopathy, no asymmetry, masses, or scars     RESP: lungs clear to auscultation - no rales, rhonchi or wheezes     CV: regular rates and rhythm, normal S1 S2, no S3 or S4 and no murmur, click or rub     ABDOMEN:  soft, nontender, no HSM or masses and bowel sounds normal     MS: extremities normal- no gross deformities noted, no evidence of inflammation in joints, FROM in all extremities.     SKIN: no suspicious lesions or rashes. Damp, warm.      NEURO: Normal strength and tone, sensory exam grossly normal, mentation intact and speech normal     PSYCH: mentation appears normal. and affect normal/bright     " LYMPHATICS: No cervical adenopathy    Recent Labs   Lab Test 04/28/23  1616 03/01/23  1606 11/08/22  1122   HGB 11.3* 11.6* 12.8    301 311   CR  --   --  0.55        Diagnostics:  No labs were ordered during this visit.   No EKG required, no history of coronary heart disease, significant arrhythmia, peripheral arterial disease or other structural heart disease.    Revised Cardiac Risk Index (RCRI):  The patient has the following serious cardiovascular risks for perioperative complications:   - No serious cardiac risks = 0 points     RCRI Interpretation: 0 points: Class I (very low risk - 0.4% complication rate)         Signed Electronically by: Mirella Acosta MD  Copy of this evaluation report is provided to requesting physician.

## 2023-07-27 NOTE — PATIENT INSTRUCTIONS
For informational purposes only. Not to replace the advice of your health care provider. Copyright   2003,  [facility]. All rights reserved. Clinically reviewed by Tawana Lee MD. Referly [id number] - REV .  Preparing for Your Surgery  Getting started  A nurse will call you to review your health history and instructions. They will give you an arrival time based on your scheduled surgery time. Please be ready to share:  Your doctor's clinic name and phone number  Your medical, surgical, and anesthesia history  A list of allergies and sensitivities  A list of medicines, including herbal treatments and over-the-counter drugs  Whether the patient has a legal guardian (ask how to send us the papers in advance)  Please tell us if you're pregnant--or if there's any chance you might be pregnant. Some surgeries may injure a fetus (unborn baby), so they require a pregnancy test. Surgeries that are safe for a fetus don't always need a test, and you can choose whether to have one.   If you have a child who's having surgery, please ask for a copy of Preparing for Your Child's Surgery.    Preparing for surgery  Within 10 to 30 days of surgery: Have a pre-op exam (sometimes called an H&P, or History and Physical). This can be done at a clinic or pre-operative center.  If you're having a , you may not need this exam. Talk to your care team.  At your pre-op exam, talk to your care team about all medicines you take. If you need to stop any medicines before surgery, ask when to start taking them again.  We do this for your safety. Many medicines can make you bleed too much during surgery. Some change how well surgery (anesthesia) drugs work.  Call your insurance company to let them know you're having surgery. (If you don't have insurance, call [phone].)  Call your clinic if there's any change in your health. This includes signs of a cold or flu (sore throat, runny nose, cough, rash, fever). It also  includes a scrape or scratch near the surgery site.  If you have questions on the day of surgery, call your hospital or surgery center.  Eating and drinking guidelines  For your safety: Unless your surgeon tells you otherwise, follow the guidelines below.  Eat and drink as usual until 8 hours before you arrive for surgery. After that, no food or milk.  Drink clear liquids until 2 hours before you arrive. These are liquids you can see through, like water, Gatorade, and Propel Water. They also include plain black coffee and tea (no cream or milk), candy, and breath mints. You can spit out gum when you arrive.  If you drink alcohol: Stop drinking it the night before surgery.  If your care team tells you to take medicine on the morning of surgery, it's okay to take it with a sip of water.  Preventing infection  Shower or bathe the night before and morning of your surgery. Follow the instructions your clinic gave you. (If no instructions, use regular soap.)  Don't shave or clip hair near your surgery site. We'll remove the hair if needed.  Don't smoke or vape the morning of surgery. You may chew nicotine gum up to 2 hours before surgery. A nicotine patch is okay.  Note: Some surgeries require you to completely quit smoking and nicotine. Check with your surgeon.  Your care team will make every effort to keep you safe from infection. We will:  Clean our hands often with soap and water (or an alcohol-based hand rub).  Clean the skin at your surgery site with a special soap that kills germs.  Give you a special gown to keep you warm. (Cold raises the risk of infection.)  Wear special hair covers, masks, gowns and gloves during surgery.  Give antibiotic medicine, if prescribed. Not all surgeries need antibiotics.  What to bring on the day of surgery  Photo ID and insurance card  Copy of your health care directive, if you have one  Glasses and hearing aids (bring cases)  You can't wear contacts during surgery  Inhaler and eye  drops, if you use them (tell us about these when you arrive)  CPAP machine or breathing device, if you use them  A few personal items, if spending the night  If you have . . .  A pacemaker, ICD (cardiac defibrillator) or other implant: Bring the ID card.  An implanted stimulator: Bring the remote control.  A legal guardian: Bring a copy of the certified (court-stamped) guardianship papers.  Please remove any jewelry, including body piercings. Leave jewelry and other valuables at home.  If you're going home the day of surgery  You must have a responsible adult drive you home. They should stay with you overnight as well.  If you don't have someone to stay with you, and you aren't safe to go home alone, we may keep you overnight. Insurance often won't pay for this.  After surgery  If it's hard to control your pain or you need more pain medicine, please call your surgeon's office.  Questions?   If you have any questions for your care team, list them here: _________________________________________________________________________________________________________________________________________________________________________ ____________________________________ ____________________________________ ____________________________________

## 2023-07-27 NOTE — PROGRESS NOTES
[facility]  [address] [place] [address]  Phone: [phone]  Fax: [phone]  Primary Provider: Caro Barrios  Pre-op Performing Provider: USMAN MARQUEZ      PREOPERATIVE EVALUATION:  Today's date: 7/27/2023    Christianne Geiger is a 32 year old female who presents for a preoperative evaluation.      7/27/2023     2:49 PM   Additional Questions   Roomed by Mary Kate CASTILLO   Accompanied by Self       Surgical Information:  Surgery/Procedure: SALPINGECTOMY, LAPAROSCOPIC   Surgery Location: [place]  Surgeon:   Surgery Date: 08/01/2023  Time of Surgery: 06:30am  Where patient plans to recover: At home with family  Fax number for surgical facility: Note does not need to be faxed, will be available electronically in Epic.    Assessment & Plan     The proposed surgical procedure is considered LOW risk.    Preop general physical exam  Sterilization  Patient is a very pleasant, generally healthy 32 year old who presents today for preop prior to sterilization procedure next week. Patient is not on any chronic medication daily, though has acyclovir PRN for cold sore flares. None currently. Well appearing on exam today. History of mildly low hemoglobin. No indication to repeat today. Plan to have UPT done day of surgery per surgeon. Of note, does not use any tobacco, regular alcohol, or other drugs.         - No identified additional risk factors other than previously addressed    Antiplatelet or Anticoagulation Medication Instructions:   - Patient is on no antiplatelet or anticoagulation medications.    Additional Medication Instructions:  Patient is on no additional chronic medications    RECOMMENDATION:  APPROVAL GIVEN to proceed with proposed procedure, without further diagnostic evaluation.    I spent a total of 20 minutes on the day of the visit.   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   Usually normally once in the summer and then once in the winter.         7/27/2023     2:42 PM   Preop Questions   1. Have you ever had a heart attack or stroke? No   2. Have you ever had surgery on your heart or blood vessels, such as a stent placement, a coronary artery bypass, or surgery on an artery in your head, neck, heart, or legs? No   3. Do you have chest pain with activity? No   4. Do you have a history of  heart failure? No   5. Do you currently have a cold, bronchitis or symptoms of other infection? No   6. Do you have a cough, shortness of breath, or wheezing? No   7. Do you or anyone in your family have previous history of blood clots? No   8. Do you or does anyone in your family have a serious bleeding problem such as prolonged bleeding following surgeries or cuts? No   9. Have you ever had problems with anemia or been told to take iron pills? UNKNOWN - saw it on her [facility] problem list, but wasn't told in the past. Last hemoglobin of 11.3 on 4/28/23   10. Have you had any abnormal blood loss such as black, tarry or bloody stools, or abnormal vaginal bleeding? No   11. Have you ever had a blood transfusion? No   12. Are you willing to have a blood transfusion if it is medically needed before, during, or after your surgery? Yes   13. Have you or any of your relatives ever had problems with anesthesia? No   14. Do you have sleep apnea, excessive snoring or daytime drowsiness? No   15. Do you have any artifical heart valves or other implanted medical devices like a pacemaker, defibrillator, or continuous glucose monitor? No   16. Do you have artificial joints? No   17. Are you allergic to latex? No   18. Is there any chance that you may be pregnant? UNKNOWN - last period a year ago. Breastfeeding. Around 3 months previously was when period came back.        Health Care Directive:  Patient does not  have a Health Care Directive or Living Will: Discussed advance care planning with patient; information given to patient to review.    Preoperative Review of :   reviewed - no record of controlled substances prescribed.    Status of Chronic Conditions:  See problem list for active medical problems.  Problems all longstanding and stable, except as noted/documented.  See ROS for pertinent symptoms related to these conditions.    Review of Systems  Constitutional, neuro, ENT, endocrine, pulmonary, cardiac, gastrointestinal, genitourinary, musculoskeletal, integument and psychiatric systems are negative, except as otherwise noted.        Patient Active Problem List    Diagnosis Date Noted    Iron deficiency anemia secondary to inadequate dietary iron intake 04/30/2023     Priority: Medium    History of marijuana use 12/03/2020     Priority: Medium    History of gestational hypertension 10/20/2020     Priority: Medium    Insulin resistance 06/25/2018     Priority: Medium     Previously on Metformin        PCOS (polycystic ovarian syndrome) 06/25/2018     Priority: Medium     On Metformin; day 21 prog low  PLAN: Femara  Cycle #1 Femara 2.5mg po day 3-7; day 21 prog/ CONCEIVED PRIOR TO TAKING FEMARA      Conception #2  spontaneous      History of tobacco use disorder 08/28/2015     Priority: Medium    Depression 11/18/2008     Priority: Medium    Esophageal reflux 03/22/2006     Priority: Medium      Past Medical History:   Diagnosis Date    Chlamydia     Pregnancy induced hypertension 2019     Past Surgical History:   Procedure Laterality Date    SURGICAL HISTORY OF -   2000    T & A    SURGICAL HISTORY OF -   2001    Left eye surgery     Current Outpatient Medications   Medication Sig Dispense Refill    acyclovir (ZOVIRAX) 400 MG tablet Take 1 tablet (400 mg) by mouth every 12 hours 90 tablet 6       No Known Allergies     Social History     Tobacco Use    Smoking status: Former     Packs/day: 0.50     Types:  "Cigarettes    Smokeless tobacco: Never    Tobacco comments:     down to 8-10 cig/day with pregnancy   Substance Use Topics    Alcohol use: Not Currently     Comment: rare- quit with pregnancy     Family History   Problem Relation Age of Onset    Cancer Mother         Cervical    Irritable Bowel Syndrome Mother     Depression Mother     Brain Tumor Mother         benign    Respiratory Father         Asthma    Diabetes Father         type II    Respiratory Brother         Asthma    Gastrointestinal Disease Brother         Reflux    Kidney Disease Maternal Grandmother     Alcohol/Drug Maternal Grandfather         alcohol    Lung Cancer Paternal Grandmother     Respiratory Paternal Grandfather         Asthma    Diabetes Paternal Grandfather     Cancer Paternal Grandfather      History   Drug Use    Types: Marijuana     Comment: using gummies for nausea with pregnancy         Objective     /68 (BP Location: Right arm, Patient Position: Sitting, Cuff Size: Adult Regular)   Pulse 102   Temp 99.5  F (37.5  C) (Tympanic)   Resp 16   Ht 1.562 m (5' 1.5\")   Wt 69.4 kg (153 lb)   SpO2 97%   Breastfeeding Yes   BMI 28.44 kg/m      Physical Exam    GENERAL APPEARANCE: healthy, alert and no distress     EYES: EOMI     HENT: ear canals and TM's normal and nose and mouth without ulcers or lesions     NECK: no adenopathy, no asymmetry, masses, or scars     RESP: lungs clear to auscultation - no rales, rhonchi or wheezes     CV: regular rates and rhythm, normal S1 S2, no S3 or S4 and no murmur, click or rub     ABDOMEN:  soft, nontender, no HSM or masses and bowel sounds normal     MS: extremities normal- no gross deformities noted, no evidence of inflammation in joints, FROM in all extremities.     SKIN: no suspicious lesions or rashes. Damp, warm.      NEURO: Normal strength and tone, sensory exam grossly normal, mentation intact and speech normal     PSYCH: mentation appears normal. and affect normal/bright     " LYMPHATICS: No cervical adenopathy    Recent Labs   Lab Test 04/28/23  1616 03/01/23  1606 11/08/22  1122   HGB 11.3* 11.6* 12.8    301 311   CR  --   --  0.55        Diagnostics:  No labs were ordered during this visit.   No EKG required, no history of coronary heart disease, significant arrhythmia, peripheral arterial disease or other structural heart disease.    Revised Cardiac Risk Index (RCRI):  The patient has the following serious cardiovascular risks for perioperative complications:   - No serious cardiac risks = 0 points     RCRI Interpretation: 0 points: Class I (very low risk - 0.4% complication rate)         Signed Electronically by: Mirella Acosta MD  Copy of this evaluation report is provided to requesting physician.

## 2023-07-31 ENCOUNTER — ANESTHESIA EVENT (OUTPATIENT)
Dept: SURGERY | Facility: CLINIC | Age: 33
End: 2023-07-31
Payer: COMMERCIAL

## 2023-07-31 ASSESSMENT — LIFESTYLE VARIABLES: TOBACCO_USE: 1

## 2023-07-31 NOTE — INTERVAL H&P NOTE
I have reviewed the surgical (or preoperative) H&P that is linked to this encounter, and examined the patient. There are no significant changes    Clinical Conditions Present on Arrival:  Clinically Significant Risk Factors Present on Admission                       
[Annual] : an annual visit.

## 2023-07-31 NOTE — ANESTHESIA PREPROCEDURE EVALUATION
Anesthesia Pre-Procedure Evaluation    Patient: Christianne Geiger   MRN: [id number] : 1990        Procedure : Procedure(s):  SALPINGECTOMY, LAPAROSCOPIC          Past Medical History:   Diagnosis Date    Chlamydia     Pregnancy induced hypertension 2019      Past Surgical History:   Procedure Laterality Date    SURGICAL HISTORY OF -       T & A    SURGICAL HISTORY OF -       Left eye surgery      No Known Allergies   Social History     Tobacco Use    Smoking status: Former     Packs/day: 0.50     Types: Cigarettes    Smokeless tobacco: Never    Tobacco comments:     down to 8-10 cig/day with pregnancy   Substance Use Topics    Alcohol use: Not Currently     Comment: rare- quit with pregnancy      Wt Readings from Last 1 Encounters:   23 69.4 kg (153 lb)        Anesthesia Evaluation   Pt has had prior anesthetic.         ROS/MED HX  ENT/Pulmonary:     (+)                tobacco use, Past use,                      Neurologic:       Cardiovascular:     (+)  hypertension- -   -  - -                                 Previous cardiac testing   Echo: Date: Results:    Stress Test:  Date: Results:    ECG Reviewed:  Date:  Results:  Sinus Rhythm w/marked sinus arrythmmia  Cath:  Date: Results:      METS/Exercise Tolerance:     Hematologic: Comments: Iron deficiency anemia secondary to inadequate dietary iron intake    (+)      anemia,          Musculoskeletal:       GI/Hepatic:     (+) GERD,                   Renal/Genitourinary:       Endo:       Psychiatric/Substance Use:     (+) psychiatric history depression   Recreational drug usage: Cannabis.    Infectious Disease:       Malignancy:       Other:            Physical Exam    Airway  airway exam normal           Respiratory Devices and Support         Dental       (+) Minor Abnormalities - some fillings, tiny chips      Cardiovascular   cardiovascular exam normal          Pulmonary                   OUTSIDE LABS:  CBC:   Lab Results   Component Value  Date    WBC 13.2 (H) 04/28/2023    WBC 13.3 (H) 03/01/2023    HGB 11.3 (L) 04/28/2023    HGB 11.6 (L) 03/01/2023    HCT 32.7 (L) 04/28/2023    HCT 35.0 03/01/2023     04/28/2023     03/01/2023     BMP:   Lab Results   Component Value Date     04/18/2021     06/22/2004    POTASSIUM 3.6 04/18/2021    POTASSIUM 3.9 06/22/2004    CHLORIDE 107 04/18/2021    CHLORIDE 107 06/22/2004    CO2 20 04/18/2021    CO2 25 06/22/2004    BUN 5 (L) 04/18/2021    BUN 7 06/22/2004    CR 0.55 11/08/2022    CR 0.57 04/18/2021    GLC 94 04/18/2021    GLC 96 10/26/2017     COAGS: No results found for: PTT, INR, FIBR  POC:   Lab Results   Component Value Date    HCG Negative 03/12/2018    HCGS Negative 11/09/2017     HEPATIC:   Lab Results   Component Value Date    ALBUMIN 2.3 (L) 04/18/2021    PROTTOTAL 6.3 (L) 04/18/2021    ALT 26 11/08/2022    AST 22 11/08/2022    ALKPHOS 154 (H) 04/18/2021    BILITOTAL 0.6 04/18/2021     OTHER:   Lab Results   Component Value Date    LACT 1.5 04/18/2021    A1C 5.2 09/21/2020    KRISTOFER 7.6 (L) 04/18/2021    TSH 0.49 10/26/2017    T4 1.18 11/04/2008       Anesthesia Plan    ASA Status:  1       Anesthesia Type: General.     - Airway: ETT              Consents    Anesthesia Plan(s) and associated risks, benefits, and realistic alternatives discussed. Questions answered and patient/representative(s) expressed understanding.     - Discussed: Risks, Benefits and Alternatives for BOTH SEDATION and the PROCEDURE were discussed     - Discussed with:  Patient            Postoperative Care    Pain management: Multi-modal analgesia.   PONV prophylaxis: Background Propofol Infusion, Ondansetron (or other 5HT-3), Dexamethasone or Solumedrol     Comments:                TITO Vergara CRNA

## 2023-08-01 ENCOUNTER — HOSPITAL ENCOUNTER (OUTPATIENT)
Facility: CLINIC | Age: 33
Discharge: HOME | End: 2023-08-01
Attending: STUDENT IN AN ORGANIZED HEALTH CARE EDUCATION/TRAINING PROGRAM | Admitting: STUDENT IN AN ORGANIZED HEALTH CARE EDUCATION/TRAINING PROGRAM
Payer: COMMERCIAL

## 2023-08-01 ENCOUNTER — ANESTHESIA (OUTPATIENT)
Dept: SURGERY | Facility: CLINIC | Age: 33
End: 2023-08-01
Payer: COMMERCIAL

## 2023-08-01 VITALS
BODY MASS INDEX: 28.89 KG/M2 | RESPIRATION RATE: 16 BRPM | DIASTOLIC BLOOD PRESSURE: 57 MMHG | OXYGEN SATURATION: 100 % | WEIGHT: 153 LBS | HEART RATE: 63 BPM | SYSTOLIC BLOOD PRESSURE: 98 MMHG | TEMPERATURE: 98.1 F | HEIGHT: 61 IN

## 2023-08-01 DIAGNOSIS — Z90.79 STATUS POST BILATERAL SALPINGECTOMY: Primary | ICD-10-CM

## 2023-08-01 DIAGNOSIS — Z30.09 STERILIZATION CONSULT: ICD-10-CM

## 2023-08-01 LAB — HCG UR QL: NEGATIVE

## 2023-08-01 PROCEDURE — 258N000003 HC RX IP 258 OP 636: Performed by: NURSE ANESTHETIST, CERTIFIED REGISTERED

## 2023-08-01 PROCEDURE — 88302 TISSUE EXAM BY PATHOLOGIST: CPT | Mod: 26 | Performed by: PATHOLOGY

## 2023-08-01 PROCEDURE — 88302 TISSUE EXAM BY PATHOLOGIST: CPT | Mod: TC | Performed by: STUDENT IN AN ORGANIZED HEALTH CARE EDUCATION/TRAINING PROGRAM

## 2023-08-01 PROCEDURE — 360N000077 HC SURGERY LEVEL 4, PER MIN: Performed by: STUDENT IN AN ORGANIZED HEALTH CARE EDUCATION/TRAINING PROGRAM

## 2023-08-01 PROCEDURE — 58661 LAPAROSCOPY REMOVE ADNEXA: CPT | Mod: 50 | Performed by: STUDENT IN AN ORGANIZED HEALTH CARE EDUCATION/TRAINING PROGRAM

## 2023-08-01 PROCEDURE — 250N000013 HC RX MED GY IP 250 OP 250 PS 637: Performed by: STUDENT IN AN ORGANIZED HEALTH CARE EDUCATION/TRAINING PROGRAM

## 2023-08-01 PROCEDURE — 710N000009 HC RECOVERY PHASE 1, LEVEL 1, PER MIN: Performed by: STUDENT IN AN ORGANIZED HEALTH CARE EDUCATION/TRAINING PROGRAM

## 2023-08-01 PROCEDURE — 250N000009 HC RX 250: Performed by: NURSE ANESTHETIST, CERTIFIED REGISTERED

## 2023-08-01 PROCEDURE — 271N000001 HC OR GENERAL SUPPLY NON-STERILE: Performed by: STUDENT IN AN ORGANIZED HEALTH CARE EDUCATION/TRAINING PROGRAM

## 2023-08-01 PROCEDURE — 58661 LAPAROSCOPY REMOVE ADNEXA: CPT | Mod: AS | Performed by: PHYSICIAN ASSISTANT

## 2023-08-01 PROCEDURE — 370N000017 HC ANESTHESIA TECHNICAL FEE, PER MIN: Performed by: STUDENT IN AN ORGANIZED HEALTH CARE EDUCATION/TRAINING PROGRAM

## 2023-08-01 PROCEDURE — 999N000141 HC STATISTIC PRE-PROCEDURE NURSING ASSESSMENT: Performed by: STUDENT IN AN ORGANIZED HEALTH CARE EDUCATION/TRAINING PROGRAM

## 2023-08-01 PROCEDURE — 272N000001 HC OR GENERAL SUPPLY STERILE: Performed by: STUDENT IN AN ORGANIZED HEALTH CARE EDUCATION/TRAINING PROGRAM

## 2023-08-01 PROCEDURE — 81025 URINE PREGNANCY TEST: CPT | Performed by: STUDENT IN AN ORGANIZED HEALTH CARE EDUCATION/TRAINING PROGRAM

## 2023-08-01 PROCEDURE — 250N000011 HC RX IP 250 OP 636: Performed by: NURSE ANESTHETIST, CERTIFIED REGISTERED

## 2023-08-01 PROCEDURE — 710N000012 HC RECOVERY PHASE 2, PER MINUTE: Performed by: STUDENT IN AN ORGANIZED HEALTH CARE EDUCATION/TRAINING PROGRAM

## 2023-08-01 PROCEDURE — 250N000009 HC RX 250: Performed by: STUDENT IN AN ORGANIZED HEALTH CARE EDUCATION/TRAINING PROGRAM

## 2023-08-01 PROCEDURE — 250N000013 HC RX MED GY IP 250 OP 250 PS 637: Performed by: NURSE ANESTHETIST, CERTIFIED REGISTERED

## 2023-08-01 RX ORDER — ACETAMINOPHEN 325 MG/1
650 TABLET ORAL EVERY 6 HOURS PRN
Qty: 24 TABLET | Refills: 0 | Status: SHIPPED | OUTPATIENT
Start: 2023-08-01

## 2023-08-01 RX ORDER — ONDANSETRON 2 MG/ML
INJECTION INTRAMUSCULAR; INTRAVENOUS PRN
Status: DISCONTINUED | OUTPATIENT
Start: 2023-08-01 | End: 2023-08-01

## 2023-08-01 RX ORDER — HYDROMORPHONE HCL IN WATER/PF 6 MG/30 ML
0.4 PATIENT CONTROLLED ANALGESIA SYRINGE INTRAVENOUS EVERY 5 MIN PRN
Status: DISCONTINUED | OUTPATIENT
Start: 2023-08-01 | End: 2023-08-01 | Stop reason: HOSPADM

## 2023-08-01 RX ORDER — FENTANYL CITRATE 50 UG/ML
50 INJECTION, SOLUTION INTRAMUSCULAR; INTRAVENOUS EVERY 5 MIN PRN
Status: DISCONTINUED | OUTPATIENT
Start: 2023-08-01 | End: 2023-08-01 | Stop reason: HOSPADM

## 2023-08-01 RX ORDER — SODIUM CHLORIDE, SODIUM LACTATE, POTASSIUM CHLORIDE, CALCIUM CHLORIDE 600; 310; 30; 20 MG/100ML; MG/100ML; MG/100ML; MG/100ML
INJECTION, SOLUTION INTRAVENOUS CONTINUOUS
Status: DISCONTINUED | OUTPATIENT
Start: 2023-08-01 | End: 2023-08-01 | Stop reason: HOSPADM

## 2023-08-01 RX ORDER — ALBUTEROL SULFATE 0.83 MG/ML
2.5 SOLUTION RESPIRATORY (INHALATION) EVERY 4 HOURS PRN
Status: DISCONTINUED | OUTPATIENT
Start: 2023-08-01 | End: 2023-08-01 | Stop reason: HOSPADM

## 2023-08-01 RX ORDER — IBUPROFEN 200 MG
600 TABLET ORAL ONCE
Status: DISCONTINUED | OUTPATIENT
Start: 2023-08-01 | End: 2023-08-01 | Stop reason: HOSPADM

## 2023-08-01 RX ORDER — ONDANSETRON 4 MG/1
4 TABLET, ORALLY DISINTEGRATING ORAL EVERY 30 MIN PRN
Status: DISCONTINUED | OUTPATIENT
Start: 2023-08-01 | End: 2023-08-01 | Stop reason: HOSPADM

## 2023-08-01 RX ORDER — OXYCODONE HYDROCHLORIDE 5 MG/1
5-10 TABLET ORAL EVERY 4 HOURS PRN
Qty: 6 TABLET | Refills: 0 | Status: SHIPPED | OUTPATIENT
Start: 2023-08-01

## 2023-08-01 RX ORDER — GABAPENTIN 300 MG/1
300 CAPSULE ORAL
Status: COMPLETED | OUTPATIENT
Start: 2023-08-01 | End: 2023-08-01

## 2023-08-01 RX ORDER — KETOROLAC TROMETHAMINE 30 MG/ML
INJECTION, SOLUTION INTRAMUSCULAR; INTRAVENOUS PRN
Status: DISCONTINUED | OUTPATIENT
Start: 2023-08-01 | End: 2023-08-01

## 2023-08-01 RX ORDER — ONDANSETRON 2 MG/ML
4 INJECTION INTRAMUSCULAR; INTRAVENOUS EVERY 30 MIN PRN
Status: DISCONTINUED | OUTPATIENT
Start: 2023-08-01 | End: 2023-08-01 | Stop reason: HOSPADM

## 2023-08-01 RX ORDER — HYDROXYZINE HYDROCHLORIDE 25 MG/1
25 TABLET, FILM COATED ORAL EVERY 6 HOURS PRN
Status: CANCELLED | OUTPATIENT
Start: 2023-08-01

## 2023-08-01 RX ORDER — IBUPROFEN 800 MG/1
800 TABLET, FILM COATED ORAL EVERY 6 HOURS PRN
Qty: 30 TABLET | Refills: 0 | Status: SHIPPED | OUTPATIENT
Start: 2023-08-01

## 2023-08-01 RX ORDER — ACETAMINOPHEN 325 MG/1
975 TABLET ORAL ONCE
Status: DISCONTINUED | OUTPATIENT
Start: 2023-08-01 | End: 2023-08-01

## 2023-08-01 RX ORDER — FENTANYL CITRATE 50 UG/ML
INJECTION, SOLUTION INTRAMUSCULAR; INTRAVENOUS PRN
Status: DISCONTINUED | OUTPATIENT
Start: 2023-08-01 | End: 2023-08-01

## 2023-08-01 RX ORDER — GLYCOPYRROLATE 0.2 MG/ML
INJECTION, SOLUTION INTRAMUSCULAR; INTRAVENOUS PRN
Status: DISCONTINUED | OUTPATIENT
Start: 2023-08-01 | End: 2023-08-01

## 2023-08-01 RX ORDER — ACETAMINOPHEN 325 MG/1
975 TABLET ORAL ONCE
Status: COMPLETED | OUTPATIENT
Start: 2023-08-01 | End: 2023-08-01

## 2023-08-01 RX ORDER — LIDOCAINE HYDROCHLORIDE AND EPINEPHRINE 10; 10 MG/ML; UG/ML
INJECTION, SOLUTION INFILTRATION; PERINEURAL PRN
Status: DISCONTINUED | OUTPATIENT
Start: 2023-08-01 | End: 2023-08-01 | Stop reason: HOSPADM

## 2023-08-01 RX ORDER — PROPOFOL 10 MG/ML
INJECTION, EMULSION INTRAVENOUS PRN
Status: DISCONTINUED | OUTPATIENT
Start: 2023-08-01 | End: 2023-08-01

## 2023-08-01 RX ORDER — DEXAMETHASONE SODIUM PHOSPHATE 4 MG/ML
INJECTION, SOLUTION INTRA-ARTICULAR; INTRALESIONAL; INTRAMUSCULAR; INTRAVENOUS; SOFT TISSUE PRN
Status: DISCONTINUED | OUTPATIENT
Start: 2023-08-01 | End: 2023-08-01

## 2023-08-01 RX ORDER — LIDOCAINE 40 MG/G
CREAM TOPICAL
Status: DISCONTINUED | OUTPATIENT
Start: 2023-08-01 | End: 2023-08-01 | Stop reason: HOSPADM

## 2023-08-01 RX ORDER — KETAMINE HYDROCHLORIDE 10 MG/ML
INJECTION INTRAMUSCULAR; INTRAVENOUS PRN
Status: DISCONTINUED | OUTPATIENT
Start: 2023-08-01 | End: 2023-08-01

## 2023-08-01 RX ADMIN — FENTANYL CITRATE 100 MCG: 50 INJECTION, SOLUTION INTRAMUSCULAR; INTRAVENOUS at 07:26

## 2023-08-01 RX ADMIN — PROPOFOL 200 MCG/KG/MIN: 10 INJECTION, EMULSION INTRAVENOUS at 07:34

## 2023-08-01 RX ADMIN — GABAPENTIN 300 MG: 300 CAPSULE ORAL at 06:55

## 2023-08-01 RX ADMIN — DEXAMETHASONE SODIUM PHOSPHATE 4 MG: 4 INJECTION, SOLUTION INTRA-ARTICULAR; INTRALESIONAL; INTRAMUSCULAR; INTRAVENOUS; SOFT TISSUE at 07:34

## 2023-08-01 RX ADMIN — KETAMINE HYDROCHLORIDE 20 MG: 10 INJECTION, SOLUTION INTRAMUSCULAR; INTRAVENOUS at 07:37

## 2023-08-01 RX ADMIN — KETOROLAC TROMETHAMINE 30 MG: 30 INJECTION, SOLUTION INTRAMUSCULAR at 08:04

## 2023-08-01 RX ADMIN — LIDOCAINE HYDROCHLORIDE 0.1 ML: 10 INJECTION, SOLUTION EPIDURAL; INFILTRATION; INTRACAUDAL; PERINEURAL at 07:15

## 2023-08-01 RX ADMIN — SODIUM CHLORIDE, POTASSIUM CHLORIDE, SODIUM LACTATE AND CALCIUM CHLORIDE 1000 ML: 600; 310; 30; 20 INJECTION, SOLUTION INTRAVENOUS at 07:15

## 2023-08-01 RX ADMIN — MIDAZOLAM 2 MG: 1 INJECTION INTRAMUSCULAR; INTRAVENOUS at 07:26

## 2023-08-01 RX ADMIN — SUGAMMADEX 200 MG: 100 INJECTION, SOLUTION INTRAVENOUS at 08:04

## 2023-08-01 RX ADMIN — GLYCOPYRROLATE 0.2 MG: 0.2 INJECTION, SOLUTION INTRAMUSCULAR; INTRAVENOUS at 07:48

## 2023-08-01 RX ADMIN — ACETAMINOPHEN 975 MG: 325 TABLET, FILM COATED ORAL at 06:55

## 2023-08-01 RX ADMIN — SODIUM CHLORIDE, POTASSIUM CHLORIDE, SODIUM LACTATE AND CALCIUM CHLORIDE: 600; 310; 30; 20 INJECTION, SOLUTION INTRAVENOUS at 07:34

## 2023-08-01 RX ADMIN — ROCURONIUM BROMIDE 50 MG: 50 INJECTION, SOLUTION INTRAVENOUS at 07:34

## 2023-08-01 RX ADMIN — ONDANSETRON 4 MG: 2 INJECTION INTRAMUSCULAR; INTRAVENOUS at 07:34

## 2023-08-01 ASSESSMENT — ACTIVITIES OF DAILY LIVING (ADL)
ADLS_ACUITY_SCORE: 35
ADLS_ACUITY_SCORE: 35

## 2023-08-01 NOTE — ANESTHESIA POSTPROCEDURE EVALUATION
Patient: Christianne Geiger    Procedure: Procedure(s):  SALPINGECTOMY, LAPAROSCOPIC       Anesthesia Type:  General    Note:  Disposition: Outpatient   Postop Pain Control:             Sign Out: Well controlled pain   PONV:    Neuro/Psych:             Sign Out: Acceptable/Baseline neuro status   Airway/Respiratory:             Sign Out: Acceptable/Baseline resp. status   CV/Hemodynamics:             Sign Out: Acceptable CV status   Other NRE: NONE   DID A NON-ROUTINE EVENT OCCUR? No           Last vitals:  Vitals Value Taken Time   /73 08/01/23 0850   Temp 36.6  C (97.9  F) 08/01/23 0826   Pulse 80 08/01/23 0850   Resp 14 08/01/23 0850   SpO2 100 % 08/01/23 0850   Vitals shown include unvalidated device data.    Electronically Signed By: TITO Laws CRNA  August 1, 2023  10:29 AM

## 2023-08-01 NOTE — OP NOTE
[facility] OB/GYN Full Operative Note    Name: Christianne Geiger   MRN: [id number]   : 1990   Procedure date: 23     Pre-operative diagnosis: Desires for permanent sterilization  Post-operative diagnosis: Desires for permanent sterilization, s/p below procedure    Procedure: Laparoscopic bilateral salpingectomy  Anesthesia: general endotracheal intubation with local anesthesia     Surgeon: Irlanda Fournier MD  Assistant: DAVE Whyte. His assistance was necessary due to the nature of the surgery (3 port sites + uterine manipulation all need skilled hands to operate).    Indications: Christianne Geiger, 32 year old , presented for above procedure because of desires for permanent sterilization.    Estimated blood loss: 5mL  Total IV fluids: 500 mL, Lactated Ringer  Total urine output: bladder was not drained   Specimens:   ID Type Source Tests Collected by Time Destination   1 :  Tissue Fallopian Tube, Bilateral SURGICAL PATHOLOGY EXAM Irlanda Fournier MD 2023  8:02 AM      Findings: No injury upon trochar entry sites. Normal appearing liver, gallbladder, bowel, appendix, uterus, bilateral fallopian tubes and ovaries. Hemostatic surgical sites      Procedure details: The patient was taken to the operating room where she was placed in the dorsal lithotomy position with feet in Yellow fin stirrups. General endotracheal anesthesia was administered. The patient was then prepped and draped in the usual sterile fashion. Patient safety time out was then performed.     An exam under anesthesia was performed. A speculum was inserted into the vagina, an Allis clamp was placed on the cervix at 12 o'clock, and a Kronner uterine manipulator inserted into the cervical os. The speculum was removed. Attention was then turned to the abdomen where 1% lidocaine with epinephrine was used to infiltrate the infertiot aspect of the umbilicus. An 12-blade scalpel was used to make a 5 mm vertical incision inferior to the  umbilicus and a Moni used to expand the incision. Laparoscopic abdominal access was obtained with a 5mm VisiPort through the umbilical incision under direct visualization. Abdominal insufflation was achieved with CO2 gas to 15mmHg. The upper abdomen was visualized and photos were taken.  A blunt probe was used to manipulate the ovaries for visualization of the entire pelvis. Photos were taken. Two 5 mm port were placed in the RLQ quadrant (one at about 2cm superior and medial to the ASIS and one about 5cm superior) under visualization. Bilateral tubal ligation was performed with LigaSure by serial cauterization and ligation from the fimbriated end to the cornual end of the fallopian tubes. Both mesosalpingx are hemostatic. The ports were removed under direct visualization. The pneumoperitoneum was expelled. The skin incisions were closed using 4-0 Monocryl and bandaids. The speculum was reinserted into the vagina, the uterine manipulator removed, and the tenaculum removed from the cervix. Good hemostasis was noted. Instrument, sponge, and needle counts were correct times 2.     Complications: None apparent   Condition: Patient taken to recovery in stable condition.    Irlanda Fournier MD  Obstetrics and Gynecology  [facility]   08/01/2023

## 2023-08-01 NOTE — ANESTHESIA CARE TRANSFER NOTE
Patient: Christianne Geiger    Procedure: Procedure(s):  SALPINGECTOMY, LAPAROSCOPIC       Diagnosis: Sterilization consult [Z30.09]  Diagnosis Additional Information: No value filed.    Anesthesia Type:   General     Note:    Oropharynx: spontaneously breathing  Level of Consciousness: drowsy  Oxygen Supplementation: face mask  Level of Supplemental Oxygen (L/min / FiO2): 6  Independent Airway: airway patency satisfactory and stable  Dentition: dentition unchanged  Vital Signs Stable: post-procedure vital signs reviewed and stable  Report to RN Given: handoff report given  Patient transferred to: PACU    Handoff Report: Identifed the Patient, Identified the Reponsible Provider, Reviewed the pertinent medical history, Discussed the surgical course, Reviewed Intra-OP anesthesia mangement and issues during anesthesia, Set expectations for post-procedure period and Allowed opportunity for questions and acknowledgement of understanding      Vitals:  Vitals Value Taken Time   /71 08/01/23 0830   Temp     Pulse 101 08/01/23 0832   Resp 11 08/01/23 0832   SpO2 100 % 08/01/23 0832   Vitals shown include unvalidated device data.    Electronically Signed By: TITO Laws CRNA  August 1, 2023  8:32 AM

## 2023-08-01 NOTE — INTERVAL H&P NOTE
"The History and Physical has been reviewed, the patient has been examined and no changes have occurred in the patient's condition since the H & P was completed.        Clinical Conditions Present on Arrival:  Clinically Significant Risk Factors Present on Admission                  # Overweight: Estimated body mass index is 28.91 kg/m  as calculated from the following:    Height as of this encounter: 1.549 m (5' 1\").    Weight as of this encounter: 69.4 kg (153 lb).       "

## 2023-08-01 NOTE — ANESTHESIA PROCEDURE NOTES
Airway       Patient location during procedure: OR       Procedure Start/Stop Times: 8/1/2023 7:38 AM  Staff -        CRNA: Anjelica Gil APRN CRNA       Performed By: CRNAIndications and Patient Condition       Indications for airway management: dana-procedural, airway protection and altered level of consciousness       Induction type:intravenous       Mask difficulty assessment: 1 - vent by mask    Final Airway Details       Final airway type: endotracheal airway       Successful airway: ETT and ETT - single  Endotracheal Airway Details        Cuffed: yes       Successful intubation technique: asleep and video laryngoscopy       VL Blade Size: MAC 3       Grade View of Cords: 1       Adjucts: stylet       Position: Right       Measured from: teeth       Secured at (cm): 21    Post intubation assessment        Placement verified by: capnometry, equal breath sounds and chest rise        Number of attempts at approach: 1       Secured with: silk tape       Ease of procedure: easy       Dentition: Unchanged    Medication(s) Administered   Medication Administration Time: 8/1/2023 7:38 AM

## 2023-08-01 NOTE — DISCHARGE INSTRUCTIONS
Same Day Surgery Discharge Instructions  Special Precautions After Surgery - Adult    It is not unusual to feel lightheaded or faint, up to 24 hours after surgery or while taking pain medication.  If you have these symptoms; sit for a few minutes before standing and have someone assist you when getting up.  You should rest and relax for the next 24 hours and must have someone stay with you for at least 24 hours after your discharge.  DO NOT DRIVE any vehicle or operate mechanical equipment for 24 hours following the end of your surgery.  DO NOT DRIVE while taking narcotic pain medications that have been prescribed by your physician.  If you had a limb operated on, you must be able to use it fully to drive.  DO NOT drink alcoholic beverages for 24 hours following surgery or while taking prescription pain medication.  Drink clear liquids (apple juice, ginger ale, broth, 7-Up, etc.).  Progress to your regular diet as you feel able.  Any questions call your physician and do not make important decisions for 24 hours.         __________________________________________________________________________________________________________________________________  IMPORTANT NUMBERS:      Same Day Surgery:  [phone], Monday - Friday until 8:30 p.m.   Clinic:  [phone]

## 2023-08-01 NOTE — PLAN OF CARE
Pt reports some lower chest discomfort with deep breaths.  Pt states she believes it is trapped gas from procedure.  Heart sounds regular rate and rhythm, lung sounds clear and equal.  No crepitus felt with palpation.  Pt able to ambulate to bathroom.  Pt encouraged to do ROM of upper arms and movement to help.  Pt verbalized understanding and states pain is tolerable. Pt will  prescriptions on way home from hospital after discharge.  IV removed.  Pt discharged home with .

## 2023-08-02 LAB
PATH REPORT.COMMENTS IMP SPEC: NORMAL
PATH REPORT.COMMENTS IMP SPEC: NORMAL
PATH REPORT.FINAL DX SPEC: NORMAL
PATH REPORT.GROSS SPEC: NORMAL
PATH REPORT.MICROSCOPIC SPEC OTHER STN: NORMAL
PATH REPORT.RELEVANT HX SPEC: NORMAL
PHOTO IMAGE: NORMAL

## 2023-08-08 ENCOUNTER — DOCUMENTATION ONLY (OUTPATIENT)
Dept: OTHER | Facility: CLINIC | Age: 33
End: 2023-08-08

## 2024-08-26 DIAGNOSIS — B00.2 ORAL HERPES: ICD-10-CM

## 2024-08-26 RX ORDER — ACYCLOVIR 400 MG/1
400 TABLET ORAL EVERY 12 HOURS
Qty: 90 TABLET | Refills: 0 | Status: SHIPPED | OUTPATIENT
Start: 2024-08-26

## 2024-08-26 NOTE — TELEPHONE ENCOUNTER
Last Written Prescription Date:  1/30/2023  Last Fill Quantity: 90,  # refills: 6   Last office visit: 6/26/2023 with Dr. Fournier     Future Office Visit:  not scheduled.        Requested Prescriptions   Pending Prescriptions Disp Refills    acyclovir (ZOVIRAX) 400 MG tablet 90 tablet 6     Sig: Take 1 tablet (400 mg) by mouth every 12 hours.       Antivirals Failed - 8/26/2024  2:14 PM        Failed - Recent (12 mo) or future (90 days) visit within the authorizing provider's specialty     The patient must have completed an in-person or virtual visit within the past 12 months or has a future visit scheduled within the next 90 days with the authorizing provider s specialty.  Urgent care and e-visits do not quality as an office visit for this protocol.          Passed - Patient is age 12 or older        Passed - Medication is active on med list        Passed - Medication indicated for associated diagnosis     Medication is associated with one or more of the following diagnoses:     Genital herpes simplex   Herpes simples   Herpes zoster, shingles   Varicella   Recurrent herpes simplex labialis   HIV infection   Varicella-zoster virus infection, prophylaxis             Medication is being filled for 1 time refill only due to:  Patient needs to be seen because it has been more than one year since last visit.    Neyda RICHARDS RN   [facility]
